# Patient Record
Sex: MALE | Race: WHITE | NOT HISPANIC OR LATINO | Employment: FULL TIME | URBAN - METROPOLITAN AREA
[De-identification: names, ages, dates, MRNs, and addresses within clinical notes are randomized per-mention and may not be internally consistent; named-entity substitution may affect disease eponyms.]

---

## 2023-10-17 ENCOUNTER — HOSPITAL ENCOUNTER (EMERGENCY)
Facility: HOSPITAL | Age: 63
Discharge: HOME | End: 2023-10-17
Attending: STUDENT IN AN ORGANIZED HEALTH CARE EDUCATION/TRAINING PROGRAM
Payer: COMMERCIAL

## 2023-10-17 ENCOUNTER — APPOINTMENT (OUTPATIENT)
Dept: RADIOLOGY | Facility: HOSPITAL | Age: 63
End: 2023-10-17
Payer: COMMERCIAL

## 2023-10-17 VITALS
HEART RATE: 75 BPM | HEIGHT: 72 IN | BODY MASS INDEX: 29.8 KG/M2 | OXYGEN SATURATION: 98 % | RESPIRATION RATE: 18 BRPM | SYSTOLIC BLOOD PRESSURE: 170 MMHG | TEMPERATURE: 97.7 F | WEIGHT: 220 LBS | DIASTOLIC BLOOD PRESSURE: 95 MMHG

## 2023-10-17 DIAGNOSIS — I10 HYPERTENSION, UNSPECIFIED TYPE: Primary | ICD-10-CM

## 2023-10-17 LAB
ALBUMIN SERPL BCP-MCNC: 4.1 G/DL (ref 3.4–5)
ALP SERPL-CCNC: 58 U/L (ref 33–136)
ALT SERPL W P-5'-P-CCNC: 23 U/L (ref 10–52)
ANION GAP SERPL CALC-SCNC: 13 MMOL/L (ref 10–20)
APPEARANCE UR: CLEAR
AST SERPL W P-5'-P-CCNC: 23 U/L (ref 9–39)
BASOPHILS # BLD AUTO: 0.07 X10*3/UL (ref 0–0.1)
BASOPHILS NFR BLD AUTO: 1 %
BILIRUB SERPL-MCNC: 0.5 MG/DL (ref 0–1.2)
BILIRUB UR STRIP.AUTO-MCNC: NEGATIVE MG/DL
BNP SERPL-MCNC: 55 PG/ML (ref 0–99)
BUN SERPL-MCNC: 27 MG/DL (ref 6–23)
CALCIUM SERPL-MCNC: 9.1 MG/DL (ref 8.6–10.3)
CARDIAC TROPONIN I PNL SERPL HS: 10 NG/L (ref 0–20)
CHLORIDE SERPL-SCNC: 101 MMOL/L (ref 98–107)
CO2 SERPL-SCNC: 26 MMOL/L (ref 21–32)
COLOR UR: YELLOW
CREAT SERPL-MCNC: 1.12 MG/DL (ref 0.5–1.3)
EOSINOPHIL # BLD AUTO: 0.6 X10*3/UL (ref 0–0.7)
EOSINOPHIL NFR BLD AUTO: 8.5 %
ERYTHROCYTE [DISTWIDTH] IN BLOOD BY AUTOMATED COUNT: 12.6 % (ref 11.5–14.5)
GFR SERPL CREATININE-BSD FRML MDRD: 74 ML/MIN/1.73M*2
GLUCOSE SERPL-MCNC: 251 MG/DL (ref 74–99)
GLUCOSE UR STRIP.AUTO-MCNC: ABNORMAL MG/DL
HCT VFR BLD AUTO: 37.1 % (ref 41–52)
HGB BLD-MCNC: 12.5 G/DL (ref 13.5–17.5)
HOLD SPECIMEN: NORMAL
HYALINE CASTS #/AREA URNS AUTO: ABNORMAL /LPF
IMM GRANULOCYTES # BLD AUTO: 0.02 X10*3/UL (ref 0–0.7)
IMM GRANULOCYTES NFR BLD AUTO: 0.3 % (ref 0–0.9)
INR PPP: 1 (ref 0.9–1.1)
KETONES UR STRIP.AUTO-MCNC: NEGATIVE MG/DL
LEUKOCYTE ESTERASE UR QL STRIP.AUTO: NEGATIVE
LYMPHOCYTES # BLD AUTO: 0.78 X10*3/UL (ref 1.2–4.8)
LYMPHOCYTES NFR BLD AUTO: 11.1 %
MCH RBC QN AUTO: 30 PG (ref 26–34)
MCHC RBC AUTO-ENTMCNC: 33.7 G/DL (ref 32–36)
MCV RBC AUTO: 89 FL (ref 80–100)
MONOCYTES # BLD AUTO: 0.39 X10*3/UL (ref 0.1–1)
MONOCYTES NFR BLD AUTO: 5.6 %
NEUTROPHILS # BLD AUTO: 5.16 X10*3/UL (ref 1.2–7.7)
NEUTROPHILS NFR BLD AUTO: 73.5 %
NITRITE UR QL STRIP.AUTO: NEGATIVE
NRBC BLD-RTO: 0 /100 WBCS (ref 0–0)
PH UR STRIP.AUTO: 6 [PH]
PLATELET # BLD AUTO: 334 X10*3/UL (ref 150–450)
PMV BLD AUTO: 10 FL (ref 7.5–11.5)
POTASSIUM SERPL-SCNC: 3.5 MMOL/L (ref 3.5–5.3)
PROT SERPL-MCNC: 7.3 G/DL (ref 6.4–8.2)
PROT UR STRIP.AUTO-MCNC: ABNORMAL MG/DL
PROTHROMBIN TIME: 11.5 SECONDS (ref 9.8–12.8)
RBC # BLD AUTO: 4.16 X10*6/UL (ref 4.5–5.9)
RBC # UR STRIP.AUTO: NEGATIVE /UL
RBC #/AREA URNS AUTO: ABNORMAL /HPF
SODIUM SERPL-SCNC: 136 MMOL/L (ref 136–145)
SP GR UR STRIP.AUTO: 1.01
UROBILINOGEN UR STRIP.AUTO-MCNC: <2 MG/DL
WBC # BLD AUTO: 7 X10*3/UL (ref 4.4–11.3)
WBC #/AREA URNS AUTO: ABNORMAL /HPF

## 2023-10-17 PROCEDURE — 71045 X-RAY EXAM CHEST 1 VIEW: CPT

## 2023-10-17 PROCEDURE — 36415 COLL VENOUS BLD VENIPUNCTURE: CPT | Performed by: STUDENT IN AN ORGANIZED HEALTH CARE EDUCATION/TRAINING PROGRAM

## 2023-10-17 PROCEDURE — 99285 EMERGENCY DEPT VISIT HI MDM: CPT | Performed by: STUDENT IN AN ORGANIZED HEALTH CARE EDUCATION/TRAINING PROGRAM

## 2023-10-17 PROCEDURE — 99284 EMERGENCY DEPT VISIT MOD MDM: CPT | Performed by: STUDENT IN AN ORGANIZED HEALTH CARE EDUCATION/TRAINING PROGRAM

## 2023-10-17 PROCEDURE — 83880 ASSAY OF NATRIURETIC PEPTIDE: CPT | Performed by: STUDENT IN AN ORGANIZED HEALTH CARE EDUCATION/TRAINING PROGRAM

## 2023-10-17 PROCEDURE — 80053 COMPREHEN METABOLIC PANEL: CPT | Performed by: STUDENT IN AN ORGANIZED HEALTH CARE EDUCATION/TRAINING PROGRAM

## 2023-10-17 PROCEDURE — 81001 URINALYSIS AUTO W/SCOPE: CPT | Performed by: STUDENT IN AN ORGANIZED HEALTH CARE EDUCATION/TRAINING PROGRAM

## 2023-10-17 PROCEDURE — 85610 PROTHROMBIN TIME: CPT | Performed by: STUDENT IN AN ORGANIZED HEALTH CARE EDUCATION/TRAINING PROGRAM

## 2023-10-17 PROCEDURE — 85025 COMPLETE CBC W/AUTO DIFF WBC: CPT | Performed by: STUDENT IN AN ORGANIZED HEALTH CARE EDUCATION/TRAINING PROGRAM

## 2023-10-17 PROCEDURE — 99283 EMERGENCY DEPT VISIT LOW MDM: CPT | Mod: 25

## 2023-10-17 PROCEDURE — 71045 X-RAY EXAM CHEST 1 VIEW: CPT | Performed by: RADIOLOGY

## 2023-10-17 PROCEDURE — 84484 ASSAY OF TROPONIN QUANT: CPT | Performed by: STUDENT IN AN ORGANIZED HEALTH CARE EDUCATION/TRAINING PROGRAM

## 2023-10-17 PROCEDURE — 93005 ELECTROCARDIOGRAM TRACING: CPT

## 2023-10-17 ASSESSMENT — LIFESTYLE VARIABLES
HAVE YOU EVER FELT YOU SHOULD CUT DOWN ON YOUR DRINKING: YES
REASON UNABLE TO ASSESS: NO
HAVE PEOPLE ANNOYED YOU BY CRITICIZING YOUR DRINKING: NO
EVER FELT BAD OR GUILTY ABOUT YOUR DRINKING: NO
EVER HAD A DRINK FIRST THING IN THE MORNING TO STEADY YOUR NERVES TO GET RID OF A HANGOVER: YES

## 2023-10-17 ASSESSMENT — COLUMBIA-SUICIDE SEVERITY RATING SCALE - C-SSRS
6. HAVE YOU EVER DONE ANYTHING, STARTED TO DO ANYTHING, OR PREPARED TO DO ANYTHING TO END YOUR LIFE?: NO
2. HAVE YOU ACTUALLY HAD ANY THOUGHTS OF KILLING YOURSELF?: NO
1. IN THE PAST MONTH, HAVE YOU WISHED YOU WERE DEAD OR WISHED YOU COULD GO TO SLEEP AND NOT WAKE UP?: NO

## 2023-10-17 ASSESSMENT — PAIN - FUNCTIONAL ASSESSMENT: PAIN_FUNCTIONAL_ASSESSMENT: 0-10

## 2023-10-17 NOTE — DISCHARGE INSTRUCTIONS
Please follow-up with your primary care physician.  Return to the emergency department if you develop any new, concerning, worsening symptoms.

## 2023-10-17 NOTE — ED PROVIDER NOTES
HPI   Chief Complaint   Patient presents with    Hypertension     Pt bp was in 180's, pt has hx of htn and takes medications,        This is a 63-year-old male patient with past medical history of hypertension presenting to the ED for asymptomatic hypertension.  He was visiting his PCP, for left-sided leg pain that has been ongoing for a long period of time, that he attributes to history of sciatica.  His blood pressure was noted be elevated on arrival to the PCP, he was instructed to take all of his home antihypertensives, and it did not this did not bring down his blood pressure to proceed the ED.  He denies having any chest pain, shortness of breath headache or other symptoms.                          [place] Coma Scale Score: 15                  Patient History   Past Medical History:   Diagnosis Date    Hypertension      No past surgical history on file.  No family history on file.  Social History     Tobacco Use    Smoking status: Never    Smokeless tobacco: Never   Substance Use Topics    Alcohol use: Yes     Alcohol/week: 3.0 standard drinks of alcohol     Types: 3 Shots of liquor per week     Comment: 3 drinks a day    Drug use: Never       Physical Exam   ED Triage Vitals [10/17/23 1153]   Temp Heart Rate Resp BP   36.5 °C (97.7 °F) 91 18 (!) 205/91      SpO2 Temp Source Heart Rate Source Patient Position   98 % Temporal Monitor Sitting      BP Location FiO2 (%)     Right arm --       Physical Exam  Constitutional:       Appearance: Normal appearance.   HENT:      Head: Normocephalic and atraumatic.      Mouth/Throat:      Mouth: Mucous membranes are moist.   Eyes:      Extraocular Movements: Extraocular movements intact.   Cardiovascular:      Rate and Rhythm: Normal rate and regular rhythm.      Heart sounds: Normal heart sounds. No murmur heard.  Pulmonary:      Effort: Pulmonary effort is normal. No respiratory distress.      Breath sounds: Normal breath sounds. No wheezing.   Abdominal:      General:  There is no distension.      Palpations: Abdomen is soft.      Tenderness: There is no abdominal tenderness. There is no guarding.   Musculoskeletal:      Right lower leg: No edema.      Left lower leg: No edema.   Skin:     General: Skin is warm and dry.   Neurological:      General: No focal deficit present.      Mental Status: He is alert and oriented to person, place, and time.   Psychiatric:         Mood and Affect: Mood normal.         Behavior: Behavior normal.         ED Course & [facility]   ED Course as of 10/17/23 1341   Tue Oct 17, 2023   1340 Blood pressure spontaneously improved by further intervention during his stay in the ED.  Laboratory studies grossly unremarkable, troponin and BNP is nonelevated. [VH]   1341 UA unremarkable, EKG nonischemic appearing.  He is appropriate discharge with outpatient PCP follow-up, strict return precautions.  Discussed with the attending  Matt Garcia DO PGY-4  Emergency Medicine     [VH]      ED Course User Index  [VH] Matt Garcia DO         Diagnoses as of 10/17/23 1341   Hypertension, unspecified type       Medical Decision Making  Hypertensive, but no symptoms on arrival to the ED concerning for asymptomatic hypertension.  We will obtain basic laboratory studies including troponin, BNP, CBC CMP to evaluate for electrolyte abnormalities or endorgan damage, urinalysis and chest x-ray.    ED Course as of 10/17/23 1341  ------------------------------------------------------------  Time: 10/17 1340  Comment: Blood pressure spontaneously improved by further intervention during his stay in the ED.  Laboratory studies grossly unremarkable, troponin and BNP is nonelevated.  By: Matt Garcia DO  ------------------------------------------------------------  Time: 10/17 1341  Comment: UA unremarkable, EKG nonischemic appearing.  He is appropriate discharge with outpatient PCP follow-up, strict return precautions.  Discussed with the attending  Matt Garcia DO  PGY-4  Emergency Medicine      By: Matt Garcia DO    ------------------------------------------------------------  Diagnoses as of 10/17/23 1341  Hypertension, unspecified type             Procedure  Procedures     Matt Garcia DO  Resident  10/17/23 1345

## 2023-10-20 ENCOUNTER — HOSPITAL ENCOUNTER (OUTPATIENT)
Dept: CARDIOLOGY | Facility: HOSPITAL | Age: 63
Discharge: HOME | End: 2023-10-20
Payer: COMMERCIAL

## 2023-10-20 LAB
ATRIAL RATE: 79 BPM
P AXIS: 64 DEGREES
P OFFSET: 195 MS
P ONSET: 126 MS
PR INTERVAL: 176 MS
Q ONSET: 214 MS
QRS COUNT: 13 BEATS
QRS DURATION: 110 MS
QT INTERVAL: 398 MS
QTC CALCULATION(BAZETT): 456 MS
QTC FREDERICIA: 436 MS
R AXIS: 49 DEGREES
T AXIS: 39 DEGREES
T OFFSET: 413 MS
VENTRICULAR RATE: 79 BPM

## 2024-02-23 ENCOUNTER — HOSPITAL ENCOUNTER (OUTPATIENT)
Dept: RADIOLOGY | Facility: CLINIC | Age: 64
Discharge: HOME | End: 2024-02-23
Payer: COMMERCIAL

## 2024-02-23 DIAGNOSIS — M25.552 PAIN IN LEFT HIP: ICD-10-CM

## 2024-02-23 PROCEDURE — 73523 X-RAY EXAM HIPS BI 5/> VIEWS: CPT | Mod: BILATERAL PROCEDURE | Performed by: RADIOLOGY

## 2024-02-23 PROCEDURE — 73523 X-RAY EXAM HIPS BI 5/> VIEWS: CPT

## 2024-03-20 ENCOUNTER — APPOINTMENT (OUTPATIENT)
Dept: RADIOLOGY | Facility: CLINIC | Age: 64
End: 2024-03-20
Payer: COMMERCIAL

## 2024-04-08 ENCOUNTER — APPOINTMENT (OUTPATIENT)
Dept: ORTHOPEDIC SURGERY | Facility: CLINIC | Age: 64
End: 2024-04-08
Payer: COMMERCIAL

## 2024-04-19 RX ORDER — METOPROLOL SUCCINATE 100 MG/1
100 TABLET, EXTENDED RELEASE ORAL DAILY
COMMUNITY

## 2024-04-19 RX ORDER — ATORVASTATIN CALCIUM 40 MG/1
40 TABLET, FILM COATED ORAL DAILY
COMMUNITY

## 2024-04-22 ENCOUNTER — HOSPITAL ENCOUNTER (OUTPATIENT)
Dept: RADIOLOGY | Facility: HOSPITAL | Age: 64
Discharge: HOME | End: 2024-04-22
Payer: COMMERCIAL

## 2024-04-22 VITALS
RESPIRATION RATE: 16 BRPM | WEIGHT: 205.69 LBS | BODY MASS INDEX: 27.86 KG/M2 | OXYGEN SATURATION: 99 % | HEART RATE: 71 BPM | HEIGHT: 72 IN | SYSTOLIC BLOOD PRESSURE: 149 MMHG | TEMPERATURE: 98.4 F | DIASTOLIC BLOOD PRESSURE: 72 MMHG

## 2024-04-22 DIAGNOSIS — M54.16 RADICULOPATHY, LUMBAR REGION: ICD-10-CM

## 2024-04-22 LAB
INR PPP: 1 (ref 0.9–1.1)
PLATELET # BLD AUTO: 231 X10*3/UL (ref 150–450)
PROTHROMBIN TIME: 11.5 SECONDS (ref 9.8–12.8)

## 2024-04-22 PROCEDURE — 36415 COLL VENOUS BLD VENIPUNCTURE: CPT | Performed by: RADIOLOGY

## 2024-04-22 PROCEDURE — 85610 PROTHROMBIN TIME: CPT | Performed by: RADIOLOGY

## 2024-04-22 PROCEDURE — 85049 AUTOMATED PLATELET COUNT: CPT | Performed by: RADIOLOGY

## 2024-04-22 PROCEDURE — 2550000001 HC RX 255 CONTRASTS

## 2024-04-22 PROCEDURE — 62304 MYELOGRAPHY LUMBAR INJECTION: CPT

## 2024-04-22 PROCEDURE — 62304 MYELOGRAPHY LUMBAR INJECTION: CPT | Performed by: RADIOLOGY

## 2024-04-22 PROCEDURE — 72132 CT LUMBAR SPINE W/DYE: CPT | Performed by: RADIOLOGY

## 2024-04-22 PROCEDURE — 72132 CT LUMBAR SPINE W/DYE: CPT

## 2024-04-22 PROCEDURE — 2500000005 HC RX 250 GENERAL PHARMACY W/O HCPCS

## 2024-04-22 RX ORDER — LIDOCAINE HYDROCHLORIDE 20 MG/ML
INJECTION, SOLUTION EPIDURAL; INFILTRATION; INTRACAUDAL; PERINEURAL AS NEEDED
Status: COMPLETED | OUTPATIENT
Start: 2024-04-22 | End: 2024-04-22

## 2024-04-22 RX ORDER — METFORMIN HYDROCHLORIDE 500 MG/1
500 TABLET, EXTENDED RELEASE ORAL
COMMUNITY

## 2024-04-22 RX ORDER — VERAPAMIL HYDROCHLORIDE 240 MG/1
240 CAPSULE, EXTENDED RELEASE ORAL NIGHTLY
COMMUNITY

## 2024-04-22 RX ORDER — RAMIPRIL 10 MG/1
10 CAPSULE ORAL DAILY
COMMUNITY

## 2024-04-22 RX ORDER — CHLORTHALIDONE 50 MG/1
50 TABLET ORAL DAILY
COMMUNITY

## 2024-04-22 RX ADMIN — LIDOCAINE HYDROCHLORIDE 5 ML: 20 INJECTION, SOLUTION EPIDURAL; INFILTRATION; INTRACAUDAL; PERINEURAL at 09:38

## 2024-04-22 RX ADMIN — IOHEXOL 15 ML: 240 INJECTION, SOLUTION INTRATHECAL; INTRAVASCULAR; INTRAVENOUS; ORAL at 09:51

## 2024-04-22 ASSESSMENT — PAIN - FUNCTIONAL ASSESSMENT
PAIN_FUNCTIONAL_ASSESSMENT: 0-10

## 2024-04-22 ASSESSMENT — PAIN SCALES - GENERAL
PAINLEVEL_OUTOF10: 2
PAINLEVEL_OUTOF10: 4
PAINLEVEL_OUTOF10: 3
PAINLEVEL_OUTOF10: 2
PAINLEVEL_OUTOF10: 5 - MODERATE PAIN
PAINLEVEL_OUTOF10: 3
PAINLEVEL_OUTOF10: 2
PAINLEVEL_OUTOF10: 2
PAINLEVEL_OUTOF10: 3
PAINLEVEL_OUTOF10: 5 - MODERATE PAIN

## 2024-04-22 ASSESSMENT — PAIN DESCRIPTION - DESCRIPTORS
DESCRIPTORS: ACHING

## 2024-04-22 ASSESSMENT — COLUMBIA-SUICIDE SEVERITY RATING SCALE - C-SSRS
1. IN THE PAST MONTH, HAVE YOU WISHED YOU WERE DEAD OR WISHED YOU COULD GO TO SLEEP AND NOT WAKE UP?: NO
6. HAVE YOU EVER DONE ANYTHING, STARTED TO DO ANYTHING, OR PREPARED TO DO ANYTHING TO END YOUR LIFE?: NO
2. HAVE YOU ACTUALLY HAD ANY THOUGHTS OF KILLING YOURSELF?: NO

## 2024-04-22 NOTE — POST-PROCEDURE NOTE
Successful fluoroscopic guided lumbar puncture for CT myelogram.  Site: L2-3  Local anesthesia with 2% Lidocaine.  22 ga spinal needle.  Free return clear CSF.  Intrathecal injection 15 mL Omnipaque 240  Needle removed and band aid placed.   Tolerated procedure well.  Transfer to CT immediately for scheduled scan then routine post procedural recovery.

## 2024-04-22 NOTE — Clinical Note
Procedure completed, pt tolerated well, 15mls of contrast utilized, no headache, taken to CT via cart.

## 2024-04-22 NOTE — Clinical Note
Pt in CT for scan, tolerated well, report called to [facility] and pt will be transported back to [facility] , bandaid on lower back site, no bleeding.

## 2024-05-14 ENCOUNTER — DOCUMENTATION (OUTPATIENT)
Dept: ORTHOPEDIC SURGERY | Facility: CLINIC | Age: 64
End: 2024-05-14

## 2024-05-14 ENCOUNTER — OFFICE VISIT (OUTPATIENT)
Dept: ORTHOPEDIC SURGERY | Facility: CLINIC | Age: 64
End: 2024-05-14
Payer: COMMERCIAL

## 2024-05-14 DIAGNOSIS — R27.0 ATAXIA: Primary | ICD-10-CM

## 2024-05-14 PROCEDURE — 1036F TOBACCO NON-USER: CPT | Performed by: ORTHOPAEDIC SURGERY

## 2024-05-14 PROCEDURE — 99214 OFFICE O/P EST MOD 30 MIN: CPT | Performed by: ORTHOPAEDIC SURGERY

## 2024-05-14 PROCEDURE — 99204 OFFICE O/P NEW MOD 45 MIN: CPT | Performed by: ORTHOPAEDIC SURGERY

## 2024-05-14 NOTE — PROGRESS NOTES
This 63-year-old man is referred by Dr. Felicia Garcia.    He has a history of congenital hydrocephalus.  He has a  shunt in place.  He is here today with his , Lesly.    They both describe gait abnormality over the last several years, perhaps more severe over the last year.  He does describe diffuse, chronic low back pain.  He describes some discomfort in both legs although not classic radiculopathy nor neurogenic claudication.    His ambulation is limited by general fatigue.    He has seen a neurosurgeon at [facility] for evaluation of his  shunt.  Apparently there are no issues associated with this although I do not have documentation confirming this.    His  shunt was performed over 20 years ago.  Attempts were made to determine whether it was MRI compatible but it was not possible to do so.  As such, he recently had an lumbar CT myelogram.    He did see Dr. Jones for evaluation of advanced arthritic change radiographically in both hips.    Family, social, and medical histories are obtained and reviewed.    30-point, patient-recorded Review of Systems is personally obtained and reviewed. Inclusive is no history of weight loss, change in appetite, recent change in activity level, change in bowel or bladder habits, fevers, chills, malaise, or night pain.    On exam he is a pleasant middle-age man no acute distress.  He is able to arise from a sitting position without difficulty.  He has a slow deliberate gait.  It is not a wide-based gait.    He has painless motion of the cervical spine.  Negative Lhermitte's.    His strength is intact in both upper extremities.  He does have positive Stephanie's bilaterally.    There is no active internal or external rotation of either hip joint passively.  This does not produce any pain however.    His strength is intact in the lower extremities without pathologic reflexes.    A CT scan from 2020 one of the cervical spine shows loss of cervical lordosis.   Mild degenerative change.  There is evidence of congenital stenosis at that time.    His CT myelogram of the lumbar spine shows moderate degenerative change, a component of congenital stenosis and the result is moderate multilevel stenosis L2-3 through L4-5.    Plain films of his pelvis and hip show advanced arthritic change with effectively ankylosed joints.    Impression: This older man has difficulty with ambulation somewhat chronically, some degree of chronic low back pain, and perhaps some degree of leg pain although not severe.    I suspect his walking difficulty is multifactorial.    To thoroughly evaluate his spinal condition, it would be important to assess for the presence of any underlying cervical or thoracic stenosis.  With his congenital lumbar stenosis, it is possible that there is underlying tandem stenosis in either the cervical or thoracic spine which might be playing a role in his gait abnormality.    Potentially, a lumbar decompression could be considered.  The predictability of significant improvement in his gait I think would be somewhat limited.  Ultimately it might be necessary to consider surgery on both his lumbar spine and his hips to attempt to achieve the most significant improvement in his gait.    Other options would be an aggressive therapy program, the use of a walker, gait training program, and continued water therapy which she has done in the past.    I think a thorough neurologic evaluation would be indicated as well.    I will reach out to his primary care physician and recommend the possibility of a CT myelogram of the cervical and thoracic spine.    ** Dictated with voice recognition software and not immediately reviewed for errors in grammar and/or spelling **

## 2024-05-14 NOTE — PROGRESS NOTES
Dear Dr. Garcia,    Thank you for the opportunity of seeing Otto Yen.    As you know, he is developed somewhat chronic difficulty with ambulation.  He has chronic low back pain and perhaps some degree of lumbar radicular pain.    He does have moderately severe lumbar stenosis on CT myelogram.    I think it is likely that his gait abnormality is multifactorial, perhaps related to his hydrocephalus, his lumbar stenosis, and his advanced arthritis in both hips.    Potentially surgery to address his lumbar stenosis could be considered.    Because he has congenital lumbar stenosis, I think it would be very important to assess the remainder of his spine, both his cervical and thoracic spine to identify any underlying stenosis and spinal cord compression.  Obviously, this would mean an additional CT myelogram.    His prior myelogram was a excellent quality study and potentially this could be repeated.    Thank you again for the opportunity of seeing this pleasant man.    Please don't hesitate to contact me at any time, via e-mail [email or web address] or cell phone [phone].      Sincerely,        Oscar Fortune M.D.  Chief, Spine Section  Professor & Steven Jackman M.D. Endowed Chair   Department of Orthopedic Surgery  [place], [place]

## 2024-05-14 NOTE — LETTER
May 14, 2024     Felicia Garcia [place]  [id number] [address] [address]    Patient: Otto Yen   YOB: 1960   Date of Visit: 5/14/2024       Dear Dr. Felicia Garcia DO:    Thank you for referring Otto Yen to me for evaluation. Below are my notes for this consultation.  If you have questions, please do not hesitate to call me. I look forward to following your patient along with you.       Sincerely,     Oscar Fortune MD      CC: No Recipients  ______________________________________________________________________________________    This 63-year-old man is referred by Dr. Felicia Garcia.    He has a history of congenital hydrocephalus.  He has a  shunt in place.  He is here today with his , Lesly.    They both describe gait abnormality over the last several years, perhaps more severe over the last year.  He does describe diffuse, chronic low back pain.  He describes some discomfort in both legs although not classic radiculopathy nor neurogenic claudication.    His ambulation is limited by general fatigue.    He has seen a neurosurgeon at [facility] for evaluation of his  shunt.  Apparently there are no issues associated with this although I do not have documentation confirming this.    His  shunt was performed over 20 years ago.  Attempts were made to determine whether it was MRI compatible but it was not possible to do so.  As such, he recently had an lumbar CT myelogram.    He did see Dr. Jones for evaluation of advanced arthritic change radiographically in both hips.    Family, social, and medical histories are obtained and reviewed.    30-point, patient-recorded Review of Systems is personally obtained and reviewed. Inclusive is no history of weight loss, change in appetite, recent change in activity level, change in bowel or bladder habits, fevers, chills, malaise, or night pain.    On exam he is a pleasant middle-age man no acute  distress.  He is able to arise from a sitting position without difficulty.  He has a slow deliberate gait.  It is not a wide-based gait.    He has painless motion of the cervical spine.  Negative Lhermitte's.    His strength is intact in both upper extremities.  He does have positive Stephanie's bilaterally.    There is no active internal or external rotation of either hip joint passively.  This does not produce any pain however.    His strength is intact in the lower extremities without pathologic reflexes.    A CT scan from 2020 one of the cervical spine shows loss of cervical lordosis.  Mild degenerative change.  There is evidence of congenital stenosis at that time.    His CT myelogram of the lumbar spine shows moderate degenerative change, a component of congenital stenosis and the result is moderate multilevel stenosis L2-3 through L4-5.    Plain films of his pelvis and hip show advanced arthritic change with effectively ankylosed joints.    Impression: This older man has difficulty with ambulation somewhat chronically, some degree of chronic low back pain, and perhaps some degree of leg pain although not severe.    I suspect his walking difficulty is multifactorial.    To thoroughly evaluate his spinal condition, it would be important to assess for the presence of any underlying cervical or thoracic stenosis.  With his congenital lumbar stenosis, it is possible that there is underlying tandem stenosis in either the cervical or thoracic spine which might be playing a role in his gait abnormality.    Potentially, a lumbar decompression could be considered.  The predictability of significant improvement in his gait I think would be somewhat limited.  Ultimately it might be necessary to consider surgery on both his lumbar spine and his hips to attempt to achieve the most significant improvement in his gait.    Other options would be an aggressive therapy program, the use of a walker, gait training program, and  continued water therapy which she has done in the past.    I think a thorough neurologic evaluation would be indicated as well.    I will reach out to his primary care physician and recommend the possibility of a CT myelogram of the cervical and thoracic spine.    ** Dictated with voice recognition software and not immediately reviewed for errors in grammar and/or spelling **

## 2024-06-05 DIAGNOSIS — N18.31 CHRONIC KIDNEY DISEASE, STAGE 3A (MULTI): Primary | ICD-10-CM
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
## 2024-06-10 ENCOUNTER — LAB (OUTPATIENT)
Dept: LAB | Facility: LAB | Age: 64
End: 2024-06-10
Payer: COMMERCIAL

## 2024-06-10 DIAGNOSIS — N18.31 CHRONIC KIDNEY DISEASE, STAGE 3A (MULTI): ICD-10-CM

## 2024-06-10 PROCEDURE — 36415 COLL VENOUS BLD VENIPUNCTURE: CPT

## 2024-06-10 PROCEDURE — 85025 COMPLETE CBC W/AUTO DIFF WBC: CPT

## 2024-06-10 PROCEDURE — 80053 COMPREHEN METABOLIC PANEL: CPT

## 2024-06-11 LAB
ALBUMIN SERPL BCP-MCNC: 4.4 G/DL (ref 3.4–5)
ALP SERPL-CCNC: 59 U/L (ref 33–136)
ALT SERPL W P-5'-P-CCNC: 14 U/L (ref 10–52)
ANION GAP SERPL CALC-SCNC: 14 MMOL/L (ref 10–20)
AST SERPL W P-5'-P-CCNC: 16 U/L (ref 9–39)
BASOPHILS # BLD AUTO: 0.11 X10*3/UL (ref 0–0.1)
BASOPHILS NFR BLD AUTO: 1.3 %
BILIRUB SERPL-MCNC: 0.4 MG/DL (ref 0–1.2)
BUN SERPL-MCNC: 36 MG/DL (ref 6–23)
CALCIUM SERPL-MCNC: 9.6 MG/DL (ref 8.6–10.6)
CHLORIDE SERPL-SCNC: 107 MMOL/L (ref 98–107)
CO2 SERPL-SCNC: 25 MMOL/L (ref 21–32)
CREAT SERPL-MCNC: 1.43 MG/DL (ref 0.5–1.3)
EGFRCR SERPLBLD CKD-EPI 2021: 55 ML/MIN/1.73M*2
EOSINOPHIL # BLD AUTO: 0.88 X10*3/UL (ref 0–0.7)
EOSINOPHIL NFR BLD AUTO: 10.7 %
ERYTHROCYTE [DISTWIDTH] IN BLOOD BY AUTOMATED COUNT: 13.3 % (ref 11.5–14.5)
GLUCOSE SERPL-MCNC: 81 MG/DL (ref 74–99)
HCT VFR BLD AUTO: 35.7 % (ref 41–52)
HGB BLD-MCNC: 11.9 G/DL (ref 13.5–17.5)
IMM GRANULOCYTES # BLD AUTO: 0.02 X10*3/UL (ref 0–0.7)
IMM GRANULOCYTES NFR BLD AUTO: 0.2 % (ref 0–0.9)
LYMPHOCYTES # BLD AUTO: 1.26 X10*3/UL (ref 1.2–4.8)
LYMPHOCYTES NFR BLD AUTO: 15.4 %
MCH RBC QN AUTO: 29.8 PG (ref 26–34)
MCHC RBC AUTO-ENTMCNC: 33.3 G/DL (ref 32–36)
MCV RBC AUTO: 89 FL (ref 80–100)
MONOCYTES # BLD AUTO: 0.69 X10*3/UL (ref 0.1–1)
MONOCYTES NFR BLD AUTO: 8.4 %
NEUTROPHILS # BLD AUTO: 5.24 X10*3/UL (ref 1.2–7.7)
NEUTROPHILS NFR BLD AUTO: 64 %
NRBC BLD-RTO: 0 /100 WBCS (ref 0–0)
PLATELET # BLD AUTO: 273 X10*3/UL (ref 150–450)
POTASSIUM SERPL-SCNC: 4.2 MMOL/L (ref 3.5–5.3)
PROT SERPL-MCNC: 7.1 G/DL (ref 6.4–8.2)
RBC # BLD AUTO: 4 X10*6/UL (ref 4.5–5.9)
SODIUM SERPL-SCNC: 142 MMOL/L (ref 136–145)
WBC # BLD AUTO: 8.2 X10*3/UL (ref 4.4–11.3)

## 2024-06-17 ENCOUNTER — APPOINTMENT (OUTPATIENT)
Dept: ORTHOPEDIC SURGERY | Facility: HOSPITAL | Age: 64
End: 2024-06-17
Payer: COMMERCIAL

## 2024-06-18 ENCOUNTER — APPOINTMENT (OUTPATIENT)
Dept: RADIOLOGY | Facility: HOSPITAL | Age: 64
End: 2024-06-18
Payer: COMMERCIAL

## 2024-06-19 ENCOUNTER — OFFICE VISIT (OUTPATIENT)
Dept: ORTHOPEDIC SURGERY | Facility: HOSPITAL | Age: 64
End: 2024-06-19
Payer: COMMERCIAL

## 2024-06-19 DIAGNOSIS — M16.12 PRIMARY OSTEOARTHRITIS OF LEFT HIP: ICD-10-CM

## 2024-06-19 DIAGNOSIS — M16.11 PRIMARY OSTEOARTHRITIS OF RIGHT HIP: Primary | ICD-10-CM

## 2024-06-19 PROCEDURE — 99214 OFFICE O/P EST MOD 30 MIN: CPT | Mod: 57 | Performed by: STUDENT IN AN ORGANIZED HEALTH CARE EDUCATION/TRAINING PROGRAM

## 2024-06-19 ASSESSMENT — PAIN DESCRIPTION - DESCRIPTORS: DESCRIPTORS: ACHING;SORE

## 2024-06-19 ASSESSMENT — PAIN - FUNCTIONAL ASSESSMENT: PAIN_FUNCTIONAL_ASSESSMENT: 0-10

## 2024-06-19 ASSESSMENT — PAIN SCALES - GENERAL: PAINLEVEL_OUTOF10: 5 - MODERATE PAIN

## 2024-06-22 PROBLEM — M16.11 UNILATERAL PRIMARY OSTEOARTHRITIS, RIGHT HIP: Status: ACTIVE | Noted: 2024-08-15

## 2024-06-25 ENCOUNTER — APPOINTMENT (OUTPATIENT)
Dept: RADIOLOGY | Facility: HOSPITAL | Age: 64
End: 2024-06-25
Payer: COMMERCIAL

## 2024-06-27 ENCOUNTER — HOSPITAL ENCOUNTER (OUTPATIENT)
Dept: RADIOLOGY | Facility: HOSPITAL | Age: 64
Discharge: HOME | End: 2024-06-27
Payer: COMMERCIAL

## 2024-06-27 ENCOUNTER — APPOINTMENT (OUTPATIENT)
Dept: ORTHOPEDIC SURGERY | Facility: CLINIC | Age: 64
End: 2024-06-27
Payer: COMMERCIAL

## 2024-06-27 VITALS
OXYGEN SATURATION: 100 % | RESPIRATION RATE: 20 BRPM | HEIGHT: 72 IN | HEART RATE: 76 BPM | BODY MASS INDEX: 25.06 KG/M2 | DIASTOLIC BLOOD PRESSURE: 89 MMHG | WEIGHT: 185 LBS | SYSTOLIC BLOOD PRESSURE: 132 MMHG | TEMPERATURE: 97.9 F

## 2024-06-27 DIAGNOSIS — Q76.49 OTHER CONGENITAL MALFORMATIONS OF SPINE, NOT ASSOCIATED WITH SCOLIOSIS: ICD-10-CM

## 2024-06-27 PROCEDURE — 2550000001 HC RX 255 CONTRASTS: Performed by: RADIOLOGY

## 2024-06-27 PROCEDURE — 72129 CT CHEST SPINE W/DYE: CPT

## 2024-06-27 PROCEDURE — 62305 MYELOGRAPHY LUMBAR INJECTION: CPT | Performed by: RADIOLOGY

## 2024-06-27 PROCEDURE — 7100000009 HC PHASE TWO TIME - INITIAL BASE CHARGE

## 2024-06-27 PROCEDURE — 7100000010 HC PHASE TWO TIME - EACH INCREMENTAL 1 MINUTE

## 2024-06-27 PROCEDURE — 62305 MYELOGRAPHY LUMBAR INJECTION: CPT

## 2024-06-27 PROCEDURE — 72129 CT CHEST SPINE W/DYE: CPT | Performed by: RADIOLOGY

## 2024-06-27 PROCEDURE — 72126 CT NECK SPINE W/DYE: CPT | Performed by: RADIOLOGY

## 2024-06-27 PROCEDURE — 72126 CT NECK SPINE W/DYE: CPT

## 2024-06-27 RX ORDER — ACETAMINOPHEN 325 MG/1
650 TABLET ORAL ONCE AS NEEDED
Status: DISCONTINUED | OUTPATIENT
Start: 2024-06-27 | End: 2024-06-28 | Stop reason: HOSPADM

## 2024-06-27 RX ORDER — LIDOCAINE HYDROCHLORIDE 10 MG/ML
5 INJECTION, SOLUTION EPIDURAL; INFILTRATION; INTRACAUDAL; PERINEURAL ONCE
Status: DISCONTINUED | OUTPATIENT
Start: 2024-06-27 | End: 2024-06-28 | Stop reason: HOSPADM

## 2024-06-27 ASSESSMENT — PAIN SCALES - GENERAL
PAINLEVEL_OUTOF10: 3

## 2024-06-27 ASSESSMENT — PAIN - FUNCTIONAL ASSESSMENT
PAIN_FUNCTIONAL_ASSESSMENT: 0-10

## 2024-06-27 ASSESSMENT — PAIN DESCRIPTION - DESCRIPTORS
DESCRIPTORS: CRAMPING
DESCRIPTORS: ACHING
DESCRIPTORS: CRAMPING

## 2024-06-27 NOTE — PRE-PROCEDURE NOTE
Interventional Radiology Preprocedure Note    Indication for procedure: The encounter diagnosis was Other congenital malformations of spine, not associated with scoliosis.    Relevant review of systems: NA    Relevant Labs:   Lab Results   Component Value Date    CREATININE 1.43 (H) 06/10/2024    EGFR 55 (L) 06/10/2024    INR 1.0 04/22/2024    PROTIME 11.5 04/22/2024       Planned Sedation/Anesthesia: local lidocaine    Airway assessment: normal    Directed physical examination:    Alert and oriented        Benefits, risks and alternatives of procedure and planned sedation have been discussed with the patient and/or their representative. All questions answered and they agree to proceed.

## 2024-06-27 NOTE — DISCHARGE INSTRUCTIONS
Follow instructions on Myelogram patient info sheet #903    - You need to have a responsible adult accompany you home.  - You may resume your normal diet.  - We strongly suggest that a responsible adult be with you for the rest of the day and also during the night. This is for your protection and safety.     For questions related to your procedure:  Please call [phone] between the hours of 7:00am-5:00pm Monday through Friday.  Please call [phone] after 5:00pm and on weekends and holidays.     In the event of an emergency call [phone] or go to your nearest emergency room.

## 2024-06-27 NOTE — POST-PROCEDURE NOTE
Interventional Radiology Brief Postprocedure Note    Attending: Dr. Samson    Assistant: Dr. De La Cruz    Diagnosis: back pain    Description of procedure:     The L3-4 vertebral space marked under fluoroscopy. Patient was prepped and draped in the usual sterile fashion. 5 ml 1% lidocaine injected for local anesthesia. Under direct fluoroscopic guidance, a lumber puncture was performed at the L3-4 interspace using a 20g spinal needle. A total of 20 mL of intrathecal contrast was subsequently injected. Multiple static fluoroscopic images were then obtained.  The stylet was replaced and needle was removed. A Band-Aid was applied. No immediate complications. The patient was then sent directly to the CT scanner. See PACSfor full details.       Anesthesia:  Local    Complications: None    Estimated Blood Loss: minimal    No specimens collected      See detailed result report with images in PACS.    The patient tolerated the procedure well without incident or complication and is in stable condition.

## 2024-07-01 ENCOUNTER — APPOINTMENT (OUTPATIENT)
Dept: RADIOLOGY | Facility: HOSPITAL | Age: 64
End: 2024-07-01
Payer: COMMERCIAL

## 2024-07-03 ENCOUNTER — APPOINTMENT (OUTPATIENT)
Dept: NEUROLOGY | Facility: CLINIC | Age: 64
End: 2024-07-03
Payer: COMMERCIAL

## 2024-07-03 VITALS
DIASTOLIC BLOOD PRESSURE: 80 MMHG | SYSTOLIC BLOOD PRESSURE: 130 MMHG | WEIGHT: 210.5 LBS | TEMPERATURE: 97.5 F | BODY MASS INDEX: 28.51 KG/M2 | HEIGHT: 72 IN | HEART RATE: 59 BPM

## 2024-07-03 DIAGNOSIS — R41.3 MEMORY CHANGE: ICD-10-CM

## 2024-07-03 DIAGNOSIS — Q03.9 CONGENITAL HYDROCEPHALUS (MULTI): Primary | ICD-10-CM

## 2024-07-03 PROCEDURE — 1036F TOBACCO NON-USER: CPT | Performed by: PSYCHIATRY & NEUROLOGY

## 2024-07-03 PROCEDURE — 99204 OFFICE O/P NEW MOD 45 MIN: CPT | Performed by: PSYCHIATRY & NEUROLOGY

## 2024-07-03 RX ORDER — LIDOCAINE 50 MG/G
PATCH TOPICAL
COMMUNITY
Start: 2024-06-21

## 2024-07-03 RX ORDER — KETOCONAZOLE 20 MG/G
CREAM TOPICAL
COMMUNITY

## 2024-07-03 RX ORDER — NALTREXONE HYDROCHLORIDE 50 MG/1
1 TABLET, FILM COATED ORAL NIGHTLY
COMMUNITY

## 2024-07-03 RX ORDER — DICLOFENAC SODIUM 10 MG/G
GEL TOPICAL
COMMUNITY
Start: 2023-10-17

## 2024-07-03 RX ORDER — THIAMINE HCL 500 MG
TABLET ORAL
COMMUNITY

## 2024-07-03 RX ORDER — KETOCONAZOLE 20 MG/ML
SHAMPOO, SUSPENSION TOPICAL
COMMUNITY

## 2024-07-03 ASSESSMENT — LIFESTYLE VARIABLES
HOW OFTEN DO YOU HAVE A DRINK CONTAINING ALCOHOL: NEVER
SKIP TO QUESTIONS 9-10: 1
HOW OFTEN DO YOU HAVE SIX OR MORE DRINKS ON ONE OCCASION: NEVER
HOW MANY STANDARD DRINKS CONTAINING ALCOHOL DO YOU HAVE ON A TYPICAL DAY: PATIENT DOES NOT DRINK
AUDIT-C TOTAL SCORE: 0

## 2024-07-03 ASSESSMENT — PATIENT HEALTH QUESTIONNAIRE - PHQ9
2. FEELING DOWN, DEPRESSED OR HOPELESS: NOT AT ALL
SUM OF ALL RESPONSES TO PHQ9 QUESTIONS 1 & 2: 0
1. LITTLE INTEREST OR PLEASURE IN DOING THINGS: NOT AT ALL

## 2024-07-03 NOTE — PROGRESS NOTES
Consulting Physician: Dr. Garcia    Chief Complaint: Memory Difficulty    History Of Present Illness  Otto Yen is a 64 y.o. male presenting with memory loss.    The patient has congential hydrocephalus.  He has a  Shunt. The patient has always had memory difficulty.  He will forget recent conversations and recent events.  His caretaker/assistant notes that he occasionally repeats himself.  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  He denies any difficulty with job performance.  He does drive without difficulty.  His assistant manages the bills.      He walks with a cane due to hip arthritis.    He denies urinary incontinence but does note that he has to rush to the bathroom.             Past Medical History  Past Medical History:   Diagnosis Date    Arthritis     Asthma (HHS-HCC)     Cataract     Hydrocephalus (Multi)     Hypertension     Sleep apnea        Surgical History  Past Surgical History:   Procedure Laterality Date    KNEE ARTHROPLASTY         Family History  No family history on file.     Social History   reports that he has never smoked. He has never used smokeless tobacco. He reports that he does not currently use alcohol. He reports that he does not currently use drugs.     Allergies  Penicillin    Medications    Current Outpatient Medications:     atorvastatin (Lipitor) 40 mg tablet, Take 1 tablet (40 mg) by mouth once daily., Disp: , Rfl:     chlorthalidone (Hygroton) 50 mg tablet, Take 1 tablet (50 mg) by mouth once daily., Disp: , Rfl:     diclofenac sodium (Voltaren) 1 % gel, APPLY 2 GRAMS TOPICALLY TO THE AFFECTED AREA UP TO FOUR TIMES DAILY, Disp: , Rfl:     ketoconazole (NIZOral) 2 % cream, APPLY ONCE DAILY TO RASH ON FACE AND EARS UNTIL CLEAR. REPEAT AS NEEDED FOR FLARES., Disp: , Rfl:     ketoconazole (NIZOral) 2 % shampoo, PLEASE SEE ATTACHED FOR DETAILED DIRECTIONS, Disp: , Rfl:     lidocaine (Lidoderm) 5 % patch, APPLY 1 PATCH TWICE A DAY BY TOPICAL ROUTE AS  DIRECTED FOR 30 DAYS., Disp: , Rfl:     metFORMIN  mg 24 hr tablet, Take 1 tablet (500 mg) by mouth once daily in the evening. Take with meals. Do not crush, chew, or split., Disp: , Rfl:     metoprolol succinate XL (Toprol-XL) 100 mg 24 hr tablet, Take 1 tablet (100 mg) by mouth once daily. Do not crush or chew., Disp: , Rfl:     naltrexone (Depade) 50 mg tablet, Take 1 tablet (50 mg) by mouth once daily at bedtime., Disp: , Rfl:     ramipril (Altace) 10 mg capsule, Take 1 capsule (10 mg) by mouth once daily., Disp: , Rfl:     thiamine (Vitamin B-1) 500 mg tablet, Take by mouth., Disp: , Rfl:     verapamil ER (Veralan PM) 240 mg 24 hr capsule, Take 1 capsule (240 mg) by mouth once daily at bedtime. Do not crush or chew., Disp: , Rfl:       Last Recorded Vitals   There were no vitals taken for this visit.    Objective:  Gen: NAD  Neuro:  --HIF:   Mini-Mental Status Exam:  Orientation to Time (Year, Season, Month, Date, Day of Week): 5  Orientation to Place (State, County, City, Name of Place, Floor):  5  Registration (Apple, Table, Leila): 3  Attention (Spell 'World' backwards): 5  Delayed Verbal Recall: 2  Naming (Watch, Pen): 2  Repetition (No Ifs, Ands, or Buts): 1  Follows command with crossover: 3  Read a sentence: 1  Write a sentence: 1  Copy a figure: 1  Total Score:     --CN:  PERRLA, EOMI, VFF, no visible facial asymmetry, facial sensation intact, no tongue or palatal deviation, SCM intact  --Motor: Moves all 4 extremities equally; no focal deficits  --Sensory: Intact to light touch, intact to pinprick  --Reflex: 2+ symmetric, toes down  --Cerebellum: FTN and HTS intact  --Gait: Shuffling Gait    Relevant Results  Lab Results   Component Value Date    WBC 8.2 06/10/2024    HGB 11.9 (L) 06/10/2024    HCT 35.7 (L) 06/10/2024    MCV 89 06/10/2024     06/10/2024       Lab Results   Component Value Date    GLUCOSE 81 06/10/2024    CALCIUM 9.6 06/10/2024     06/10/2024    K 4.2 06/10/2024     "CO2 25 06/10/2024     06/10/2024    BUN 36 (H) 06/10/2024    CREATININE 1.43 (H) 06/10/2024       Lab Results   Component Value Date    HGBA1C 5.9 (H) 09/22/2022       No results found for: \"CHOL\"  No results found for: \"HDL\"  No results found for: \"LDLCALC\"  No results found for: \"TRIG\"  No components found for: \"CHOLHDL\"    CT Brain (I personally reviewed the images/tracings with the following interpretation) 2021  Diffuse hydrocephalus noted    CT Brain (2024):  IMPRESSION:   Right parietal ventriculostomy catheter with severe hydrocephalus, and cortical thinning. Findings not significantly changed from 5/26/2006.            Assessment:   Congenital Hydrocephalus s/p VPS  - recommend evaluation Neurosurgery evaluation     2.  Memory Changes  - he's had short-term memory difficulty for several years  - on exam, his MMSE was 29/30  - he remains very high functioning - he continues to work  - - encouraged patient to participate in mentally stimulating activities (i.e. crossword puzzles, sudoku puzzles, etc)  - encouraged physical exercise (which has been shown to be beneficial for memory health)  - recommend mediterranean diet  - check B12 level    Follow up in 1 year        Jared Guzman MD  [facility]  Department of Neurology      A copy of this note was sent to the referring provider.    "

## 2024-07-08 ENCOUNTER — LAB (OUTPATIENT)
Dept: LAB | Facility: LAB | Age: 64
End: 2024-07-08
Payer: COMMERCIAL

## 2024-07-08 DIAGNOSIS — R41.3 MEMORY CHANGE: ICD-10-CM

## 2024-07-08 PROCEDURE — 82607 VITAMIN B-12: CPT

## 2024-07-08 PROCEDURE — 36415 COLL VENOUS BLD VENIPUNCTURE: CPT

## 2024-07-09 LAB — VIT B12 SERPL-MCNC: 669 PG/ML (ref 211–911)

## 2024-07-11 ENCOUNTER — TELEPHONE (OUTPATIENT)
Dept: RHEUMATOLOGY | Facility: CLINIC | Age: 64
End: 2024-07-11

## 2024-07-23 ENCOUNTER — CLINICAL SUPPORT (OUTPATIENT)
Dept: PREADMISSION TESTING | Facility: HOSPITAL | Age: 64
End: 2024-07-23
Payer: COMMERCIAL

## 2024-07-23 ENCOUNTER — TELEPHONE (OUTPATIENT)
Dept: PREADMISSION TESTING | Facility: HOSPITAL | Age: 64
End: 2024-07-23
Payer: COMMERCIAL

## 2024-07-23 DIAGNOSIS — M16.11 UNILATERAL PRIMARY OSTEOARTHRITIS, RIGHT HIP: ICD-10-CM

## 2024-07-24 RX ORDER — CYANOCOBALAMIN 1000 UG/ML
1000 INJECTION, SOLUTION INTRAMUSCULAR; SUBCUTANEOUS
COMMUNITY

## 2024-07-24 RX ORDER — BISMUTH SUBSALICYLATE 262 MG
1 TABLET,CHEWABLE ORAL DAILY
COMMUNITY

## 2024-07-24 RX ORDER — IBUPROFEN 200 MG
400 TABLET ORAL 2 TIMES DAILY
COMMUNITY

## 2024-07-24 RX ORDER — GABAPENTIN 600 MG/1
600 TABLET ORAL NIGHTLY
COMMUNITY

## 2024-07-24 RX ORDER — GLUCOSAMINE/CHONDRO SU A 500-400 MG
1 TABLET ORAL DAILY
COMMUNITY

## 2024-07-25 ENCOUNTER — EDUCATION (OUTPATIENT)
Dept: ORTHOPEDIC SURGERY | Facility: CLINIC | Age: 64
End: 2024-07-25
Payer: COMMERCIAL

## 2024-07-25 ENCOUNTER — PRE-ADMISSION TESTING (OUTPATIENT)
Dept: PREADMISSION TESTING | Facility: HOSPITAL | Age: 64
End: 2024-07-25
Payer: COMMERCIAL

## 2024-07-25 ENCOUNTER — LAB (OUTPATIENT)
Dept: LAB | Facility: LAB | Age: 64
End: 2024-07-25
Payer: COMMERCIAL

## 2024-07-25 ENCOUNTER — DOCUMENTATION (OUTPATIENT)
Dept: PREADMISSION TESTING | Facility: HOSPITAL | Age: 64
End: 2024-07-25

## 2024-07-25 VITALS
SYSTOLIC BLOOD PRESSURE: 134 MMHG | DIASTOLIC BLOOD PRESSURE: 71 MMHG | TEMPERATURE: 97.2 F | WEIGHT: 214.95 LBS | RESPIRATION RATE: 16 BRPM | HEART RATE: 56 BPM | BODY MASS INDEX: 29.11 KG/M2 | HEIGHT: 72 IN

## 2024-07-25 DIAGNOSIS — Z01.818 PREOP TESTING: Primary | ICD-10-CM

## 2024-07-25 DIAGNOSIS — Z01.818 PREOP TESTING: ICD-10-CM

## 2024-07-25 LAB
BASOPHILS # BLD AUTO: 0.1 X10*3/UL (ref 0–0.1)
BASOPHILS NFR BLD AUTO: 1.3 %
EOSINOPHIL # BLD AUTO: 1.2 X10*3/UL (ref 0–0.7)
EOSINOPHIL NFR BLD AUTO: 15.8 %
ERYTHROCYTE [DISTWIDTH] IN BLOOD BY AUTOMATED COUNT: 13.2 % (ref 11.5–14.5)
EST. AVERAGE GLUCOSE BLD GHB EST-MCNC: 126 MG/DL
HBA1C MFR BLD: 6 %
HCT VFR BLD AUTO: 35.5 % (ref 41–52)
HGB BLD-MCNC: 11.6 G/DL (ref 13.5–17.5)
IMM GRANULOCYTES # BLD AUTO: 0.02 X10*3/UL (ref 0–0.7)
IMM GRANULOCYTES NFR BLD AUTO: 0.3 % (ref 0–0.9)
LYMPHOCYTES # BLD AUTO: 1.17 X10*3/UL (ref 1.2–4.8)
LYMPHOCYTES NFR BLD AUTO: 15.4 %
MCH RBC QN AUTO: 29.5 PG (ref 26–34)
MCHC RBC AUTO-ENTMCNC: 32.7 G/DL (ref 32–36)
MCV RBC AUTO: 90 FL (ref 80–100)
MONOCYTES # BLD AUTO: 0.55 X10*3/UL (ref 0.1–1)
MONOCYTES NFR BLD AUTO: 7.2 %
NEUTROPHILS # BLD AUTO: 4.56 X10*3/UL (ref 1.2–7.7)
NEUTROPHILS NFR BLD AUTO: 60 %
NRBC BLD-RTO: 0 /100 WBCS (ref 0–0)
PLATELET # BLD AUTO: 252 X10*3/UL (ref 150–450)
RBC # BLD AUTO: 3.93 X10*6/UL (ref 4.5–5.9)
WBC # BLD AUTO: 7.6 X10*3/UL (ref 4.4–11.3)

## 2024-07-25 PROCEDURE — 93005 ELECTROCARDIOGRAM TRACING: CPT | Performed by: PHYSICIAN ASSISTANT

## 2024-07-25 PROCEDURE — 87081 CULTURE SCREEN ONLY: CPT | Mod: AHULAB | Performed by: PHYSICIAN ASSISTANT

## 2024-07-25 PROCEDURE — 99204 OFFICE O/P NEW MOD 45 MIN: CPT | Performed by: PHYSICIAN ASSISTANT

## 2024-07-25 PROCEDURE — 83036 HEMOGLOBIN GLYCOSYLATED A1C: CPT

## 2024-07-25 PROCEDURE — 85025 COMPLETE CBC W/AUTO DIFF WBC: CPT

## 2024-07-25 PROCEDURE — 36415 COLL VENOUS BLD VENIPUNCTURE: CPT

## 2024-07-25 PROCEDURE — 93010 ELECTROCARDIOGRAM REPORT: CPT | Performed by: INTERNAL MEDICINE

## 2024-07-25 RX ORDER — CHLORHEXIDINE GLUCONATE ORAL RINSE 1.2 MG/ML
SOLUTION DENTAL
Qty: 475 ML | Refills: 0 | Status: SHIPPED | OUTPATIENT
Start: 2024-07-25

## 2024-07-25 ASSESSMENT — ENCOUNTER SYMPTOMS
RHINORRHEA: 0
ABDOMINAL DISTENTION: 0
TROUBLE SWALLOWING: 0
EXCESSIVE BLEEDING: 0
CONSTIPATION: 0
NUMBNESS: 0
EYE PAIN: 0
MYALGIAS: 0
EYE DISCHARGE: 0
DYSURIA: 0
WEAKNESS: 0
COUGH: 0
NECK PAIN: 0
ABDOMINAL PAIN: 0
LIMITED RANGE OF MOTION: 0
BLOOD IN STOOL: 0
WHEEZING: 0
DYSPNEA AT REST: 0
CONFUSION: 0
SINUS CONGESTION: 0
VOMITING: 0
ARTHRALGIAS: 1
DIFFICULTY URINATING: 0
WOUND: 0
PALPITATIONS: 0
DIARRHEA: 0
NAUSEA: 0
VISUAL CHANGE: 0
BRUISES/BLEEDS EASILY: 1
DOUBLE VISION: 0
FEVER: 0
CHILLS: 0
NECK STIFFNESS: 0
SHORTNESS OF BREATH: 0
SKIN CHANGES: 0
DYSPNEA WITH EXERTION: 0
LIGHT-HEADEDNESS: 0
UNEXPECTED WEIGHT CHANGE: 0
HEMOPTYSIS: 0

## 2024-07-25 ASSESSMENT — HOOS JR
SITTING: MODERATE
GOING UP OR DOWN STAIRS: MODERATE
HOOS JR TOTAL INTERVAL SCORE: 52.97
BENDING TO THE FLOOR TO PICK UP OBJECT: MODERATE
RISING FROM SITTING: MODERATE
LYING IN BED (TURNING OVER, MAINTAINING HIP POSITION): MODERATE
WALKING ON UNEVEN SURFACE: MODERATE
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
## 2024-07-25 NOTE — PREPROCEDURE INSTRUCTIONS
Medication List            Accurate as of July 25, 2024  1:55 PM. Always use your most recent med list.                atorvastatin 40 mg tablet  Commonly known as: Lipitor  Medication Adjustments for Surgery: Take morning of surgery with sip of water, no other fluids     chlorhexidine 0.12 % solution  Commonly known as: Peridex  Swish for 30 seconds and spit 15mL of solution the night before and morning of surgery     chlorthalidone 50 mg tablet  Commonly known as: Hygroton  Medication Adjustments for Surgery: Take morning of surgery with sip of water, no other fluids     cyanocobalamin 1,000 mcg/mL injection  Commonly known as: Vitamin B-12  Medication Adjustments for Surgery: Continue until night before surgery     diclofenac sodium 1 % gel  Commonly known as: Voltaren  Medication Adjustments for Surgery: Continue until night before surgery     gabapentin 600 mg tablet  Commonly known as: Neurontin  Medication Adjustments for Surgery: Take morning of surgery with sip of water, no other fluids     glucosamine-chondroitin 500-400 mg tablet  Medication Adjustments for Surgery: Stop 7 days before surgery     ibuprofen 200 mg tablet  Medication Adjustments for Surgery: Stop 7 days before surgery     * ketoconazole 2 % shampoo  Commonly known as: NIZOral  Medication Adjustments for Surgery: Continue until night before surgery     * ketoconazole 2 % cream  Commonly known as: NIZOral  Medication Adjustments for Surgery: Continue until night before surgery     lidocaine 5 % patch  Commonly known as: Lidoderm  Medication Adjustments for Surgery: Continue until night before surgery     metFORMIN  mg 24 hr tablet  Commonly known as: Glucophage-XR  Medication Adjustments for Surgery: Continue until night before surgery     metoprolol succinate  mg 24 hr tablet  Commonly known as: Toprol-XL  Medication Adjustments for Surgery: Take morning of surgery with sip of water, no other fluids     multivitamin  tablet  Medication Adjustments for Surgery: Stop 7 days before surgery     ramipril 10 mg capsule  Commonly known as: Altace  Notes to patient: HOLD evening prior to surgery and morning of surgery        thiamine 500 mg tablet  Commonly known as: Vitamin B-1  Medication Adjustments for Surgery: Continue until night before surgery     verapamil  mg 24 hr capsule  Commonly known as: Veralan PM  Medication Adjustments for Surgery: Take morning of surgery with sip of water, no other fluids     VITAMIN D3-VITAMIN K2 ORAL  Medication Adjustments for Surgery: Stop 7 days before surgery           * This list has 2 medication(s) that are the same as other medications prescribed for you. Read the directions carefully, and ask your doctor or other care provider to review them with you.                                **Concerning above medication instructions, if medication is normally taken at night, continue normal schedule.**  **DO NOT TAKE NIGHT PRIOR AND MORNING OF SURGERY**    CONTACT SURGEON'S OFFICE IF YOU DEVELOP:  * Fever = 100.4 F   * New respiratory symptoms (e.g. cough, shortness of breath, respiratory distress, sore throat)  * Recent loss of taste or smell  *Flu like symptoms such as headache, fatigue or gastrointestinal symptoms  * You develop any open sores, shingles, burning or painful urination   AND/OR:  * You no longer wish to have the surgery.  * Any other personal circumstances change that may lead to the need to cancel or defer this surgery.  *You were admitted to any hospital within one week of your planned procedure.    SMOKING:  *Quitting smoking can make a huge difference to your health and recovery from surgery.    *If you need help with quitting, call 4-438-QUIT-NOW.    THE DAY BEFORE SURGERY:   Nothing by mouth (no solids or liquids) after midnight.   If diabetic, please check fasting blood sugar upon waking up.    If fasting sugar is <80 mg/dl, please drink 100ml/3oz of apple juice no later  than 2 hours prior to arrival time.      SURGICAL TIME  *You will be contacted between 2 p.m. and 6 p.m. the business day before your surgery with your arrival time.  *If you haven't received a call by 6pm, call [phone].  *Scheduled surgery times may change and you will be notified if this occurs-check your personal voicemail for any updates.    ON THE MORNING OF SURGERY:  *Wear comfortable, loose fitting clothing.   *Do not use moisturizers, creams, lotions or perfume.  *All jewelry and valuables should be left at home.  *Prosthetic devices such as contact lenses, hearing aids, dentures, eyelash extensions, hairpins and body piercing must be removed before surgery.    BRING WITH YOU:  *Photo ID and insurance card  *Current list of medicines and allergies  *Pacemaker/Defibrillator/Heart stent cards  *CPAP machine and mask  *Slings/splints/crutches  *Copy of your complete Advanced Directive/DHPOA-if applicable  *Neurostimulator implant remote    PARKING AND ARRIVAL:  *Check in at the Main Entrance desk and let them know you are here for surgery.  *You will be directed to the 2nd floor surgical waiting area.    AFTER OUTPATIENT SURGERY:  *A responsible adult MUST accompany you at the time of discharge and stay with you for 24 hours after your surgery.  *You may NOT drive yourself home after surgery.  *You may use a taxi or ride sharing service ([employer], Uber) to return home ONLY if you are accompanied by a friend or family member.  *Instructions for resuming your medications will be provided by your surgeon.        Patient Information: Oral/Dental Rinse  **This is a prescription; pick it up at your preferred local pharmacy **  What is oral/dental rinse?   It is a mouthwash. It is a way of cleaning the mouth with a germ killing solution before your surgery.  The solution contains chlorhexidine, commonly known as CHG.   It is used inside the mouth to kill a bacteria known as Staphylococcus aureus.  Let your doctor  know if you are allergic to Chlorhexidine.    Why do I need to use CHG oral/dental rinse?  The CHG oral/dental rinse helps to kill a bacteria in your mouth known a Staphylococcus aureus.     This reduces the risk of infection at the surgical site.      Using your CHG oral/dental rinse  STEPS:  Use your CHG oral/dental rinse after you brush your teeth the night before (at bedtime) and the morning of your surgery.  Follow all directions on your prescription label.    Use the cap on the container to measure 15ml (fill cap to fill line)  Swish (gargle if you can) the mouthwash in your mouth for at least 30 seconds, (do not to swallow) spit out  After you use your CHG rinse, do not rinse your mouth with water, drink or eat.  Please refer to prescription label for the appropriate time to resume oral intake  Dental rinse comes in one size bottle: 473ml ~16oz.  You will have leftover    rinse, discard after this use.    What side effects might I have using the CHG oral/dental rinse?  CHG rinse will stick to plaque on the teeth.  Brush and floss just before use.  Teeth brushing will help avoid staining of plaque during use.    Who should I contact if I have questions about the CHG oral/dental rinse?  Please call [facility], Preadmission Testing at [phone] if you have any questions      Home Preoperative Antibacterial Shower     What is a home preoperative antibacterial shower?  This shower is a way of cleaning the skin with a germ killing soap before surgery.  The soap contains chlorhexidine, commonly known as CHG.  CHG is a soap for your skin with germ killing ability.  Let your doctor know if you are allergic to chlorhexidine.    Why do I need to take a preoperative antibacterial shower?  Skin is not sterile.  It is best to try to make your skin as free of germs as possible before surgery.  Proper cleansing with a germ killing soap before surgery can lower the number of germs on your  skin.  This helps to reduce the risk of infection at the surgical site.  Following the instructions listed below will help you prepare your skin for surgery.      How do I use the CHG skin cleanser?  Steps:  Begin using your CHG soap five days before your scheduled surgery on ________________________.    Days 1-4 Shower before bed:  Wash your face and genitals with your normal soap and rinse.    Wash and rinse your hair using the CHG soap. Rinse completely, do not condition your   Hair.          3.    Apply the CHG soap to a clean wet washcloth.  Turn the water off or move away                From the water spray to avoid premature rinsing of the CHG soap as you are applying.     4.   Lather your entire body from the neck down.  Do not use on your face or genitals.   Pay special attention to the area(s) where your incision(s) will be located unless they are on your face.  Avoid scrubbing your skin too hard.  The important point is to have the CHG soap sit on your skin for 3 minutes.    When the 3 minutes are up, turn on the water and rinse the CHG soap off your body completely.   Pat yourself dry with a clean, freshly-laundered towel.  Dress in clean, freshly laundered night clothes.    Be sure to sleep with clean, freshly laundered sheets.  Day 5:  Last shower is the morning before surgery: Follow above Instructions.    NOTE:    *Hair extensions should be removed    *Keep CHG soap out of eyes and ear canals   *DO NOT wash with regular soap on your body after you have used the CHG        soap solution  *DO NOT apply powders, lotions, or perfume.  *Deodorant may be used days 1-4, BUT NOT the day of surgery    Who should I contact if I have any questions regarding the use of CHG soap?  Call the [facility], Preadmission Testing at [phone] if you have any questions.              Patient Information: Pre-Operative Infection Prevention Measures     Why did I have my nose, under my arms  and groin swabbed?  The purpose of the swab is to identify Staphylococcus aureus inside your nose or on your skin.  The swab was sent to the laboratory for culture.  A positive swab/culture for Staphylococcus aureus is called colonization or carriage.      What is Staphylococcus aureus?  Staphylococcus aureus, also known as “staph”, is a germ found on the skin or in the nose of healthy people.  Sometimes Staphylococcus aureus can get into the body and cause an infection.  This can be minor (such as pimples, boils or other skin problems).  It might also be serious (such as blood infection, pneumonia or a surgical site infection).    What is Staphylococcus aureus colonization or carriage?  Colonization or carriage means that a person has the germ but is not sick from it.  These bacteria can be spread on the hands or when breathing or sneezing.    How is Staphylococcus aureus spread?  It is most often spread by close contact with a person or item that carries it.    What happens if my culture is positive for Staphylococcus aureus?  Your doctor/medical team will use this information to guide any antibiotic treatment which may be necessary.  Regardless of the culture results, we will clean the inside of your nose with a betadine swab just before you have your surgery.      Will I get an infection if I have Staphylococcus aureus in my nose or on my skin?  Anyone can get an infection with Staphylococcus aureus.  However, the best way to reduce your risk of infection is to follow the instructions provided to you for the use of your CHG soap and dental rinse.        Who should I contact if I have any questions?  Call the [facility], Preadmission Testing at [phone] if you have any questions.

## 2024-07-25 NOTE — GROUP NOTE
In addition to the group class activities, Otto Yen had the following lab work completed:  No orders of the defined types were placed in this encounter.    Otto Yen  attended joint class on 7/25 and Brought a care partner to the class. The preop survey was completed and the patient provided attestation that they understand the content reviewed and that they do have a care partner identified to assist with recovery. Patient was provided with a folder of materials and instructed to call orthopedic navigator with questions.     A new History and Physical was not completed.    This class lasted approximately 2 hours and had 10 participants. The nurse instructor covered the following topics:  Thank you for attending our Joint Replacement class today in preparation for your upcoming surgery.  Topics discussed include:    MyChart Enrollment  Communication with Care Team  My Chart is the best form of communication to reach all of your caregivers  You can send messages to specific care givers, or a care team  Continued Education  You will be enrolled in a Total Joint Replacement care plan to receive additional education before and after surgery  You can review a short recording of the class content  Access to Medical Records  You can access test results, office notes, appointments, etc.  You can connect to other healthcare systems who use [employer] ([facility], [facility], [facility], etc.)  [id number]  Program Information  Using Meds to Beds is our standard process for joint replacements at [facility] to minimize issues with homegoing medications. Please let us know ahead of time if there is a reason why Meds to Beds cannot be used    Background/Understanding of Joint Replacement Surgery  Potential for same day discharge  Any questions or concerns about your specific surgery/plan are to be directed to the surgeon's office    How to Prepare for Surgery  Use of Nicotine Products/Smoking  Stop several weeks before surgery  Such  products slow down the healing process and increase risk of post-op infection and complications  Clearance for Surgery  Medical Clearance by Specialists  Dental Clearance  Cracked/Broken/Loose teeth left untreated may postpone surgery  The importance of post-op antibiotics for dental visits per surgeon protocol  Preadmission Testing  **Potential for postponed surgery if appropriate clearance is not obtained  Medication Instruction  Follow instructions provided by the doctor who prescribes your medication (typically, but not limited to cardiologist)  Preadmission testing will provide additional instructions during your appointment on what to stop and what to take as you get closer to surgery  For clarification of these instructions, please call preadmission testing directly - [phone]  Tips for Preparing the home for discharge from the hospital  Care Partner  Requirement for surgery, the patient must have a plan to have help at home  Potential for postponed surgery if plan for home support cannot be established  How the care partner can help after surgery  CHG Body Wash/Mouth Wash  Follow the instructions given at preadmission testing  Body wash is to be used on the body and hair for 5 washes  Mouthwash is to be used the night before and morning of surgery  **This is a system-wide protocol developed by infectious disease professionals, we will not alter our recommendations for those with sensitive skin or those who have special hair needs.  Please follow the instructions as they are written as this will provide the best infection prevention measures for surgery.  Should you have an allergy to one of the products, please discuss with your preadmission team**    What to Expect in the Hospital/At Home  Morning of Surgery NPO Guidelines  Nothing to eat after midnight  Water can be consumed up to 2 hours prior to arrival  Surgical and Post-Surgical Care Team  Surgical Team  Anesthesia Team  Nursing  Physical  Therapy  Care Coordinating  Pharmacy  Hospital Arrival Instructions  Arrive at the time provided to you  Consider traffic patterns (rush-hour) based on arrival time  Have arrangements made for a ride home  If discharging same day, care partner should remain at the hospital  Recovering after Surgery  Recovery Room - Visitors are not brought back  Transition to hospital room - 2nd Floor, Visitors will be directed to your room  The presence of and strategies for controlling surgical pain and swelling  The importance of early mobility  Side effects after surgery  What to expect if staying overnight    Discharge Planning  The intended plan for discharge will be for patients to discharge home  All patients require a care partner (family, friend, neighbor, etc.) to stay with the patient for the first few nights after surgery  The inability to secure help at home will postpone surgery  Home Care Services set up per surgeon order  Physical Therapy  Occupational Therapy  **If desired, private duty care can be arranged by the patient ahead of time**  Outpatient Physical Therapy per surgeon order    Recovering at Home  Wound Care  Follow wound care instructions found in your discharge paperwork  Bandage is water-resistant and you may shower with the bandage  Do not scrub directly over the bandage  Do not submerge in water until cleared (bathtub, hot tub, pool, etc.)    Post-Op Risk Prevention  Infection Prevention  Promptly seek treatment for any infections post-operatively  Routine dental visits must be postponed for 3 months after surgery  Your surgeon may require antibiotics prior to future dental visits  Any concerns for infection not related directly to the knee or the hip should be managed by your primary care provider  Blood Clots  Be sure to complete the course of blood thinning medication as prescribed by your surgeon  Movement every 1-2 hours during the day is encouraged to prevent blood clots  Monitor for signs of  blood clots  Wear compression stockings as prescribed by your surgeon  Constipation  Constipation is common following surgery  Drink plenty of fluids  Take stool softener/laxative as prescribed by your surgeon  Move around frequently  Eat foods high in fiber  Fall Prevention  Prepare home ahead of time to clear space to move with walker  Remove throw rugs and electrical cords from walkways  Install railings near any stairways with more than 2 steps  Use night lights for increased visibility at night  Continue to use your assistive device until cleared by surgeon or physical therapy  Dislocation Prevention - Not all procedures will have dislocation precautions  Follow dislocation precautions provided by your surgeon  It is OK to resume sexual activity about 6 weeks following surgery  Be sure to follow any dislocation precautions assigned    Durable Medical Equipment  Cold Therapy  Breg Cold Therapy Machines  Ice/Gel Packs  Assistive Devices  Folding Walker with Wheels (in the front only)  No Rollators  Crutches if approved by Physical Therapy and Surgeon after surgery  Hip Kits  Raised Toilet Seats  Additional Compression Stockings    Joint Preservation  Healthy Activities when Cleared  Walking  Swimming  Bike Riding  Activities to Avoid  Refrain from repetitive motions which have a high impact on the joint  Gradual Progression  Progress activity slowly, listen to your body  Common Findings - NORMAL after surgery  Clicking/Grinding  Numbness near incision    Physical Therapy  Prehabilitation exercises  START TODAY ON BOTH LEGS  Surgery Specific Precautions  Follow surgery specific precautions found in your discharge paperwork    Follow-Up Visit  All patients will see their surgeon for a follow up visit after surgery  The visit may range from 2-6 weeks after surgery and is surgeon specific      Please don't hesitate to reach out if you have any additional questions or concerns.    Jitendra Talavera BSN, RN  Ria  Ayad BSN, RN-BC  Orthopedic Nurses  [facility]   [phone]

## 2024-07-25 NOTE — H&P (VIEW-ONLY)
[facility]/PAT Evaluation       Name: Otto Yen (Otto Yen)  /Age: 1960/64 y.o.         Date of Consult: 24     Referring Provider: Dr. Menchaca    Surgery, Date, and Length: Right Hip Total Arthroplasty ~ Anterior Approach - Right , 8/15/24, 180MIN    Otto Yen is a 64 year-old male who presents to the [facility] for perioperative risk assessment prior to surgery.    Patient presents with a primary diagnosis of right hip osteoarthritis. He refers to pain in b/l hips for the past 4 years.  He has tried PT and PT exercises which have not provided significant pain relief.  He has also tried NSAID and tylenol which provide minimal pain relief.  He uses lidocaine cream which helps somewhat as well.      This note was created in part upon personal review of patient's medical records.      Patient is scheduled to have Right Hip Total Arthroplasty ~ Anterior Approach - Right      Pt denies any past history of anesthetic complications such as PONV, awareness, prolonged sedation, dental damage, aspiration, cardiac arrest, difficult intubation, difficult I.V. access or unexpected hospital admissions.  NO malignant hyperthermia and or pseudocholinesterase deficiency.  No history of blood transfusions     The patient is not a Gnosticism and will accept blood and blood products if medically indicated.   Type and screen NOT sent.     Past Medical History:   Diagnosis Date    Alcohol abuse, in remission     Quit 2023    Anemia 06/10/2024    H/H 11.9/35.7    Arthritis     Cataract     Chronic kidney disease     GFR-55, Creat-1.43, BUN-36 6/10/24    Eczema     HLD (hyperlipidemia)     HTN (hypertension)     Hydrocephalus (Multi)     as child, shunt placed at age 1    Hypertension     Intraventricular hemorrhage (Multi)     spontaneous, craniotomy at age 29 for SDH evacuation    Irritable bowel syndrome     Liver disease     Liver enzymes normalized after stopped drinking alcohol     Pre-diabetes      on metformin, A1C= 5.9% on 9/22/22    Reactive airway disease (HHS-HCC)     no current inhaler use or need, used inhaler once 2/2 dust allergy    Shuffling gait     recent increase; seeing neurosurgery 8/5/24 for 2nd opinion    Sleep apnea     uses CPAP nightly, ? setting    Squamous cell skin cancer     head       Past Surgical History:   Procedure Laterality Date    CRANIOTOMY      SDH evacuation at age 29    CYSTOSCOPY  2011    KNEE Left 1972    patella removed    VENTRICULOPERITONEAL SHUNT Right 1961    age 1    VENTRICULOPERITONEAL SHUNT Right 1989    Revision    VENTRICULOPERITONEAL SHUNT Right 2006    Revision       Patient  reports that he is not currently sexually active and has had partner(s) who are female.    No family history on file.    Social History     Socioeconomic History    Marital status: Single     Spouse name: Not on file    Number of children: Not on file    Years of education: Not on file    Highest education level: Not on file   Occupational History    Not on file   Tobacco Use    Smoking status: Never    Smokeless tobacco: Never   Vaping Use    Vaping status: Never Used   Substance and Sexual Activity    Alcohol use: Not Currently     Comment: quit 12/2023, prevous 1 fifth scotch QD    Drug use: Not Currently    Sexual activity: Not Currently     Partners: Female   Other Topics Concern    Not on file   Social History Narrative    Not on file     Social Determinants of Health     Financial Resource Strain: Patient Declined (3/21/2024)    Received from [facility]    Overall Financial Resource Strain (CARDIA)     Difficulty of Paying Living Expenses: Patient declined   Food Insecurity: Patient Declined (3/21/2024)    Received from [facility]    Hunger Vital Sign     Worried About Running Out of Food in the Last Year: Patient declined     Ran Out of Food in the Last Year: Patient declined   Transportation Needs: Patient Declined (3/21/2024)    Received from  [employer]    PRAPARE - Transportation     Lack of Transportation (Medical): Patient declined     Lack of Transportation (Non-Medical): Patient declined   Physical Activity: Patient Declined (3/21/2024)    Received from [employer]    Exercise Vital Sign     Days of Exercise per Week: Patient declined     Minutes of Exercise per Session: Patient declined   Stress: No Stress Concern Present (12/2/2022)    Received from [facility] Occupational [facility] - Occupational Stress Questionnaire     Feeling of Stress : Only a little   Social Connections: Patient Declined (3/21/2024)    Received from [employer]    Social Connection and Isolation Panel [NHANES]     Frequency of Communication with Friends and Family: Patient declined     Frequency of Social Gatherings with Friends and Family: Patient declined     Attends Ep[employer] Services: Patient declined     Active Member of Clubs or Organizations: Patient declined     Attends Club or Organization Meetings: Patient declined     Marital Status: Patient declined   Intimate Partner Violence: Patient Declined (3/21/2024)    Received from [employer]    Humiliation, Afraid, Rape, and Kick questionnaire     Fear of Current or Ex-Partner: Patient declined     Emotionally Abused: Patient declined     Physically Abused: Patient declined     Sexually Abused: Patient declined   Housing Stability: Unknown (3/21/2024)    Received from [employer]    Housing Stability Vital Sign     Unable to Pay for Housing in the Last Year: Patient refused     Number of Places Lived in the Last Year: Not on file     Unstable Housing in the Last Year: Patient refused      Allergies   Allergen Reactions    Penicillin Hives     as a child-       Current Outpatient Medications:     atorvastatin (Lipitor) 40 mg tablet, Take 1 tablet (40 mg) by mouth once daily., Disp: , Rfl:     chlorthalidone  (Hygroton) 50 mg tablet, Take 1 tablet (50 mg) by mouth once daily., Disp: , Rfl:     cholecalciferol, vitD3,/vit K2 (VITAMIN D3-VITAMIN K2 ORAL), Take 1 tablet by mouth once daily., Disp: , Rfl:     cyanocobalamin (Vitamin B-12) 1,000 mcg/mL injection, Inject 1 mL (1,000 mcg) into the muscle every 30 (thirty) days., Disp: , Rfl:     diclofenac sodium (Voltaren) 1 % gel, APPLY 2 GRAMS TOPICALLY TO THE AFFECTED AREA UP TO FOUR TIMES DAILY, Disp: , Rfl:     gabapentin (Neurontin) 600 mg tablet, Take 1 tablet (600 mg) by mouth once daily at bedtime., Disp: , Rfl:     glucosamine-chondroitin 500-400 mg tablet, Take 1 tablet by mouth once daily., Disp: , Rfl:     ibuprofen 200 mg tablet, Take 2 tablets (400 mg) by mouth 2 times a day., Disp: , Rfl:     ketoconazole (NIZOral) 2 % shampoo, PLEASE SEE ATTACHED FOR DETAILED DIRECTIONS, Disp: , Rfl:     lidocaine (Lidoderm) 5 % patch, APPLY 1 PATCH TWICE A DAY BY TOPICAL ROUTE AS DIRECTED FOR 30 DAYS., Disp: , Rfl:     metoprolol succinate XL (Toprol-XL) 100 mg 24 hr tablet, Take 1 tablet (100 mg) by mouth once daily. Do not crush or chew., Disp: , Rfl:     multivitamin tablet, Take 1 tablet by mouth once daily., Disp: , Rfl:     ramipril (Altace) 10 mg capsule, Take 1 capsule (10 mg) by mouth once daily., Disp: , Rfl:     thiamine (Vitamin B-1) 500 mg tablet, Take by mouth., Disp: , Rfl:     verapamil ER (Veralan PM) 240 mg 24 hr capsule, Take 1 capsule (240 mg) by mouth once daily at bedtime. Do not crush or chew., Disp: , Rfl:     chlorhexidine (Peridex) 0.12 % solution, Swish for 30 seconds and spit 15mL of solution the night before and morning of surgery, Disp: 475 mL, Rfl: 0    ketoconazole (NIZOral) 2 % cream, APPLY ONCE DAILY TO RASH ON FACE AND EARS UNTIL CLEAR. REPEAT AS NEEDED FOR FLARES., Disp: , Rfl:     metFORMIN  mg 24 hr tablet, Take 1 tablet (500 mg) by mouth once daily. Do not crush, chew, or split., Disp: , Rfl:       PAT ROS:   Constitutional:    no  fever   no chills   no unexpected weight change  Neuro/Psych:    macrocephaly   no numbness   no weakness   no light-headedness   no confusion  Eyes:    no discharge   no pain   no vision loss   no diplopia   no visual disturbance   use of corrective lenses  Ears:    no ear pain   no hearing loss   no tinnitus  Nose:    no nasal discharge   no sinus congestion   no epistaxis  Mouth:    no dental issues   no mouth pain   no oral bleeding   no mouth lesions  Throat:    no throat pain   no dysphagia  Neck:    no neck pain   no neck stiffness  Cardio:    Functional 4 Mets. Patient denies SOB walking up 1 flights of stairs   Walking, swimming, cooking, cleaning, grocery shopping    no chest pain   no palpitations   no peripheral edema   no dyspnea   no NUNES  Respiratory:    no cough   no wheezing   no hemoptysis   no shortness of breath  Endocrine:    no cold intolerance   no heat intolerance  GI:    no abdominal distention   no abdominal pain   no constipation   no diarrhea   no nausea   no vomiting   no blood in stool  :    Urinary urgency with incontinence   no difficulty urinating   no dysuria   no oliguria   polyuria  Musculoskeletal:    arthralgias (b/l hips, b/l knees)   no myalgias   no decreased ROM  Hematologic:    bruises/bleeds easily   no excessive bleeding   no history of blood transfusion   no blood clots  Skin:   no skin changes   no sores/wound   no rash      Physical Exam  Constitutional:       General: He is not in acute distress.     Appearance: Normal appearance. He is not ill-appearing, toxic-appearing or diaphoretic.   HENT:      Head: Normocephalic and atraumatic.      Nose: Nose normal. No rhinorrhea.      Mouth/Throat:      Pharynx: No oropharyngeal exudate.   Eyes:      Extraocular Movements: Extraocular movements intact.      Conjunctiva/sclera: Conjunctivae normal.   Cardiovascular:      Rate and Rhythm: Normal rate and regular rhythm.      Heart sounds: No murmur heard.     No friction rub.  No gallop.      Comments: Functional 4 Mets. Patient denies SOB walking up 2 flights of stairs     Pulmonary:      Effort: Pulmonary effort is normal. No respiratory distress.      Breath sounds: Normal breath sounds. No stridor. No wheezing or rhonchi.   Abdominal:      General: Bowel sounds are normal. There is no distension.      Palpations: Abdomen is soft. There is no mass.      Tenderness: There is no abdominal tenderness. There is no guarding or rebound.      Hernia: No hernia is present.   Genitourinary:     Comments: Urinary urgency and frequency with incontinence  Musculoskeletal:         General: Tenderness (pain and decreased ROM  with external rotation of b/l hips) present. No swelling, deformity or signs of injury. Normal range of motion.      Cervical back: Normal range of motion and neck supple. No rigidity or tenderness.   Skin:     General: Skin is warm and dry.      Coloration: Skin is not jaundiced or pale.      Findings: No bruising, erythema, lesion or rash.   Neurological:      General: No focal deficit present.      Mental Status: He is alert and oriented to person, place, and time.      Cranial Nerves: No cranial nerve deficit.      Sensory: No sensory deficit.      Motor: No weakness.      Coordination: Coordination normal.      Gait: Gait abnormal.      Comments: Macrocephaly; new onset shuffling gait   Psychiatric:         Mood and Affect: Mood normal.         Behavior: Behavior normal.          PAT AIRWAY:   Airway:     Mallampati::  I    Neck ROM::  Full   No broken teeth, no dentures and no missing teeth          Visit Vitals  /71   Pulse 56   Temp 36.2 °C (97.2 °F)   Resp 16   Ht 1.829 m (6')   Wt 97.5 kg (214 lb 15.2 oz)   BMI 29.15 kg/m²   Smoking Status Never   BSA 2.23 m²        LABS:  Lab Results   Component Value Date    WBC 8.2 06/10/2024    HGB 11.9 (L) 06/10/2024    HCT 35.7 (L) 06/10/2024    MCV 89 06/10/2024     06/10/2024      Lab Results   Component Value  Date    WBC 7.6 07/25/2024    HGB 11.6 (L) 07/25/2024    HCT 35.5 (L) 07/25/2024    MCV 90 07/25/2024     07/25/2024      Lab Results   Component Value Date    GLUCOSE 81 06/10/2024    CALCIUM 9.6 06/10/2024     06/10/2024    K 4.2 06/10/2024    CO2 25 06/10/2024     06/10/2024    BUN 36 (H) 06/10/2024    CREATININE 1.43 (H) 06/10/2024      Lab Results   Component Value Date    ALT 14 06/10/2024    AST 16 06/10/2024    ALKPHOS 59 06/10/2024    BILITOT 0.4 06/10/2024      Lab Results   Component Value Date    HGBA1C 6.0 (H) 07/25/2024      EKG 7/25/24  Sinus bradycardia  Otherwise normal EKG  Vent rate 53 bpm    CT head 2/23/24  EXAMINATION: CT HEAD W/O CONTRAST 02/23/2024 03:13 PM   CLINICAL HISTORY:  shunt for congental hydrocephalus, last revision in 2006   ASSOCIATED DIAGNOSIS: Congenital hydrocephalus (HCC)   ORDERING PROVIDER: LORENZA CALLAHAN   TECHNOLOGISTS NOTE:   COMPARISON: CT HEAD W/O CONTRAST 5/24/2006, 12:53 PM   TECHNIQUE: Thin axial imaging of the head was performed without intravenous contrast.     FINDINGS:     Right parietal approach ventriculostomy catheter terminates in the posterior body of the right lateral ventricle. Persistent severe ventricular enlargement and colpocephaly. Unchanged severe cortical thinning most notably in the parietal lobes. Left greater than right hypodense subdural collections in the posterior fossa, similar to prior is suggestive of prominent cisterna magna.     No acute hemorrhage or CT evidence of acute territorial infarct.     Apart from multiple bone defects from prior ventriculostomy catheters, the skull is intact. Polypoid mucosal thickening in the maxillary sinuses. The tympanomastoid cavities are unopacified.     IMPRESSION:   Right parietal ventriculostomy catheter with severe hydrocephalus, and cortical thinning. Findings not significantly changed from 5/26/2006.     Assessment and Plan:     Patient is a 64-year-old male scheduled for a Right  "Hip Total Arthroplasty ~ Anterior Approach - Right with Dr. Menchaca on 8/15/24.    Patient has no active cardiac symptoms.   Patient denies any chest pain, tightness, heaviness, pressure, radiating pain, palpitations, irregular heartbeats, lightheadedness, cough, congestion, shortness of breath, NUNES, PND, near syncope, weight loss or gain.     RCRI  0  , 3.9 % Risk of MACE    Cardiac:  HTN - cont chlorthalidone , metoprolol on dos ; Ramipril HOLD evening prior to surgery and morning of surgery       Encouraged lifestyle modifications, low-sodium diet, and increase activity as tolerated.  Monitor BP and follow up with managing physician for readings sustaining >140/90.    HLD - cont statin on dos     Pulmonary:  Asthma - cont inhalers as prescribed if needed    MARCELA - Known and treated  MARCELA- Patient was instructed to bring CPAP machine the morning of surgery. Recommend prioritizing  nonopioid analgesic techniques (regional and local anesthesia, nonsteroidal medications, etc) before the administration of opioids and close monitoring for hypoventilation after surgery due to MARCELA. Increased vigilance is recommended with the use of narcotics due to an increased risk for opioid induced respiratory depression       Renal:  CKDIIIA  6/10/24 crt 1.43; GFR 55    Recommendations to avoid nephrotoxic drugs and carefully monitor fluid status to maintain euvolemia. Use dose adjusted medications as needed for the underlying level of renal function.     Neuro:  Hydrocephalus -  shunt placed at age 2yo ; shunt removed at 1.4yo, then shunt replaced at age 28yo at time of SDH- pt notes shuffling gait and neurology has placed referral to neuro-surg; Appt 7/31/24 with Dr. Tim Avina @ 1pm. Hold metformin dos. CT head from 2/2024 is stable.      Intraventricular hemorrhage - craniotomy at age 28yo for evacuation of SDH. Hold metformin dos     Pt seen by Dr. Tim Avina 7/31/24 and states:  \"There is no evidence of elevated " "intracranial pressure.  He is cleared for surgery.\"    Psych:  EtOH abuse in remission - Monitor for signs and symptoms of substance withdrawal during the postoperative period, assess, and treat as needed with the appropriate monitoring tool.     Hematology:  Anemia  6/10/24 H/H 11.9/35.7%  7/26/24 H/H 11.6/35.5%    Patient instructed to ambulate as soon as possible postoperatively to decrease thromboembolic risk.   Initiate mechanical DVT prophylaxis as soon as possible and initiate chemical prophylaxis when deemed safe from a bleeding standpoint post surgery.     LABS: CBC and CMP reviewed from 6/10/24 and show anemia.  CBC, MRSA, A1c and EKG ordered.     Followup: Labs, MRSA, A1c and neurosurg clearance pending    Addendum 7/26/24:  A1c and CBC results reviewed and show ongoing anemia.  A1c meets [facility] criteria to proceed with TJR as planned    Communication: Dr. Menchaca made aware of upcoming neuro-surg appointment as mentioned above    Addendum 8/1/24:  Pt cleared for surgery by neurosurg on 7/31    STOP BANG: +MARCELA    Caprini: 8    Risk assessment complete.  Patient is scheduled for a intermediate surgical risk procedure.        Preoperative medication instructions were provided and reviewed with the patient.  Any additional testing or evaluation was explained to the patient.  Nothing by mouth instructions were discussed and patient's questions were answered prior to conclusion to this encounter.  Patient verbalized understanding of preoperative instructions given in preadmission testing; discharge instructions available in EMR.    This note was dictated by a speech recognition.  Minor errors may have been detected in a speech recognition.       "

## 2024-07-25 NOTE — CPM/PAT NURSE NOTE
CPM/PAT Nurse Note      Name: Otot Yen (Otto Yen)  /Age: 1960/64 y.o.       Past Medical History:   Diagnosis Date    Alcohol abuse, in remission     Quit 2023    Anemia 06/10/2024    H/H 11.9/35.7    Arthritis     Cataract     Chronic kidney disease     GFR-55, Creat-1.43, BUN-36 6/10/24    Eczema     HLD (hyperlipidemia)     HTN (hypertension)     Hydrocephalus (Multi)     as child, shunt placed at age 1    Hypertension     Intraventricular hemorrhage (Multi)     spontaneous, craniotomy at age 29 for SDH evacuation    Irritable bowel syndrome     Liver disease     Liver enzymes normalized after stopped drinking alcohol     Pre-diabetes     on metformin, A1C= 5.9% on 22    Reactive airway disease (HHS-HCC)     no current inhaler use or need, used inhaler once 2/2 dust allergy    Shuffling gait     recent increase; seeing neurosurgery 24 for 2nd opinion    Sleep apnea     uses CPAP nightly, ? setting    Squamous cell skin cancer     head       Past Surgical History:   Procedure Laterality Date    CRANIOTOMY      SDH evacuation at age 29    CYSTOSCOPY      KNEE Left     patella removed    VENTRICULOPERITONEAL SHUNT Right     age 1    VENTRICULOPERITONEAL SHUNT Right     Revision    VENTRICULOPERITONEAL SHUNT Right     Revision       Patient  reports that he is not currently sexually active and has had partner(s) who are female.    No family history on file.    Allergies   Allergen Reactions    Penicillin Hives     as a child-       Prior to Admission medications    Medication Sig Start Date End Date Taking? Authorizing Provider   atorvastatin (Lipitor) 40 mg tablet Take 1 tablet (40 mg) by mouth once daily.    Historical Provider, MD   chlorhexidine (Peridex) 0.12 % solution Swish for 30 seconds and spit 15mL of solution the night before and morning of surgery 24   Tiffany Enriquez PA-C   chlorthalidone (Hygroton) 50 mg tablet Take 1 tablet (50 mg) by mouth  once daily.    Historical Provider, MD   cholecalciferol, vitD3,/vit K2 (VITAMIN D3-VITAMIN K2 ORAL) Take 1 tablet by mouth once daily.    Historical Provider, MD   cyanocobalamin (Vitamin B-12) 1,000 mcg/mL injection Inject 1 mL (1,000 mcg) into the muscle every 30 (thirty) days.    Historical Provider, MD   diclofenac sodium (Voltaren) 1 % gel APPLY 2 GRAMS TOPICALLY TO THE AFFECTED AREA UP TO FOUR TIMES DAILY 10/17/23   Historical Provider, MD   gabapentin (Neurontin) 600 mg tablet Take 1 tablet (600 mg) by mouth once daily at bedtime.    Historical Provider, MD   glucosamine-chondroitin 500-400 mg tablet Take 1 tablet by mouth once daily.    Historical Provider, MD   ibuprofen 200 mg tablet Take 2 tablets (400 mg) by mouth 2 times a day.    Historical Provider, MD   ketoconazole (NIZOral) 2 % cream APPLY ONCE DAILY TO RASH ON FACE AND EARS UNTIL CLEAR. REPEAT AS NEEDED FOR FLARES.    Historical Provider, MD   ketoconazole (NIZOral) 2 % shampoo PLEASE SEE ATTACHED FOR DETAILED DIRECTIONS    Historical Provider, MD   lidocaine (Lidoderm) 5 % patch APPLY 1 PATCH TWICE A DAY BY TOPICAL ROUTE AS DIRECTED FOR 30 DAYS. 6/21/24   Historical Provider, MD   metFORMIN  mg 24 hr tablet Take 1 tablet (500 mg) by mouth once daily. Do not crush, chew, or split.    Historical Provider, MD   metoprolol succinate XL (Toprol-XL) 100 mg 24 hr tablet Take 1 tablet (100 mg) by mouth once daily. Do not crush or chew.    Historical Provider, MD   multivitamin tablet Take 1 tablet by mouth once daily.    Historical Provider, MD   ramipril (Altace) 10 mg capsule Take 1 capsule (10 mg) by mouth once daily.    Historical Provider, MD   thiamine (Vitamin B-1) 500 mg tablet Take by mouth.    Historical Provider, MD   verapamil ER (Veralan PM) 240 mg 24 hr capsule Take 1 capsule (240 mg) by mouth once daily at bedtime. Do not crush or chew.    Historical Provider, MD   naltrexone (Depade) 50 mg tablet Take 1 tablet (50 mg) by mouth once  daily at bedtime.  7/24/24  Historical Provider, MD CHIRAG FORDE Risk Score    No data to display       Caprini DVT Assessment    No data to display       Modified Frailty Index    No data to display       CHADS2 Stroke Risk  Current as of 3 hours ago        N/A 3 to 100%: High Risk   2 to < 3%: Medium Risk   0 to < 2%: Low Risk     Last Change: N/A          This score determines the patient's risk of having a stroke if the patient has atrial fibrillation.        This score is not applicable to this patient. Components are not calculated.          Revised Cardiac Risk Index    No data to display       Apfel Simplified Score    No data to display       Risk Analysis Index Results This Encounter    No data found in the last 1 encounters.         Nurse Plan of Action:     After Visit Summary (AVS) reviewed and patient verbalized good understanding of medications and NPO instructions.  Pre-op infection prevention measures:  CHG showers and mouthwash reviewed, understanding voiced.  CHG soap given and patient verbalized need to pick CHG mouthwash at their preferred local pharmacy.

## 2024-07-25 NOTE — H&P (VIEW-ONLY)
[facility]/PAT Evaluation       Name: Otto Yen (Otto Yen)  /Age: 1960/64 y.o.         Date of Consult: 24     Referring Provider: Dr. Menchaca    Surgery, Date, and Length: Right Hip Total Arthroplasty ~ Anterior Approach - Right , 8/15/24, 180MIN    Otto Yen is a 64 year-old male who presents to the [facility] for perioperative risk assessment prior to surgery.    Patient presents with a primary diagnosis of right hip osteoarthritis. He refers to pain in b/l hips for the past 4 years.  He has tried PT and PT exercises which have not provided significant pain relief.  He has also tried NSAID and tylenol which provide minimal pain relief.  He uses lidocaine cream which helps somewhat as well.      This note was created in part upon personal review of patient's medical records.      Patient is scheduled to have Right Hip Total Arthroplasty ~ Anterior Approach - Right      Pt denies any past history of anesthetic complications such as PONV, awareness, prolonged sedation, dental damage, aspiration, cardiac arrest, difficult intubation, difficult I.V. access or unexpected hospital admissions.  NO malignant hyperthermia and or pseudocholinesterase deficiency.  No history of blood transfusions     The patient is not a [employer] and will accept blood and blood products if medically indicated.   Type and screen NOT sent.     Past Medical History:   Diagnosis Date    Alcohol abuse, in remission     Quit 2023    Anemia 06/10/2024    H/H 11.9/35.7    Arthritis     Cataract     Chronic kidney disease     GFR-55, Creat-1.43, BUN-36 6/10/24    Eczema     HLD (hyperlipidemia)     HTN (hypertension)     Hydrocephalus (Multi)     as child, shunt placed at age 1    Hypertension     Intraventricular hemorrhage (Multi)     spontaneous, craniotomy at age 29 for SDH evacuation    Irritable bowel syndrome     Liver disease     Liver enzymes normalized after stopped drinking alcohol     Pre-diabetes      on metformin, A1C= 5.9% on 9/22/22    Reactive airway disease (HHS-HCC)     no current inhaler use or need, used inhaler once 2/2 dust allergy    Shuffling gait     recent increase; seeing neurosurgery 8/5/24 for 2nd opinion    Sleep apnea     uses CPAP nightly, ? setting    Squamous cell skin cancer     head       Past Surgical History:   Procedure Laterality Date    CRANIOTOMY      SDH evacuation at age 29    CYSTOSCOPY  2011    KNEE Left 1972    patella removed    VENTRICULOPERITONEAL SHUNT Right 1961    age 1    VENTRICULOPERITONEAL SHUNT Right 1989    Revision    VENTRICULOPERITONEAL SHUNT Right 2006    Revision       Patient  reports that he is not currently sexually active and has had partner(s) who are female.    No family history on file.    Social History     Socioeconomic History    Marital status: Single     Spouse name: Not on file    Number of children: Not on file    Years of education: Not on file    Highest education level: Not on file   Occupational History    Not on file   Tobacco Use    Smoking status: Never    Smokeless tobacco: Never   Vaping Use    Vaping status: Never Used   Substance and Sexual Activity    Alcohol use: Not Currently     Comment: quit 12/2023, prevous 1 fifth scotch QD    Drug use: Not Currently    Sexual activity: Not Currently     Partners: Female   Other Topics Concern    Not on file   Social History Narrative    Not on file     Social Determinants of Health     Financial Resource Strain: Patient Declined (3/21/2024)    Received from [employer]    Overall Financial Resource Strain (CARDIA)     Difficulty of Paying Living Expenses: Patient declined   Food Insecurity: Patient Declined (3/21/2024)    Received from [employer]    Hunger Vital Sign     Worried About Running Out of Food in the Last Year: Patient declined     Ran Out of Food in the Last Year: Patient declined   Transportation Needs: Patient Declined (3/21/2024)    Received from  [employer]    PRAPARE - Transportation     Lack of Transportation (Medical): Patient declined     Lack of Transportation (Non-Medical): Patient declined   Physical Activity: Patient Declined (3/21/2024)    Received from [employer]    Exercise Vital Sign     Days of Exercise per Week: Patient declined     Minutes of Exercise per Session: Patient declined   Stress: No Stress Concern Present (12/2/2022)    Received from [facility] - Occupational Stress Questionnaire     Feeling of Stress : Only a little   Social Connections: Patient Declined (3/21/2024)    Received from [employer]    Social Connection and Isolation Panel [NHANES]     Frequency of Communication with Friends and Family: Patient declined     Frequency of Social Gatherings with Friends and Family: Patient declined     Attends [employer] Services: Patient declined     Active Member of Clubs or Organizations: Patient declined     Attends Club or Organization Meetings: Patient declined     Marital Status: Patient declined   Intimate Partner Violence: Patient Declined (3/21/2024)    Received from [employer]    Humiliation, Afraid, Rape, and Kick questionnaire     Fear of Current or Ex-Partner: Patient declined     Emotionally Abused: Patient declined     Physically Abused: Patient declined     Sexually Abused: Patient declined   Housing Stability: Unknown (3/21/2024)    Received from [employer]    Housing Stability Vital Sign     Unable to Pay for Housing in the Last Year: Patient refused     Number of Places Lived in the Last Year: Not on file     Unstable Housing in the Last Year: Patient refused      Allergies   Allergen Reactions    Penicillin Hives     as a child-       Current Outpatient Medications:     atorvastatin (Lipitor) 40 mg tablet, Take 1 tablet (40 mg) by mouth once daily., Disp: , Rfl:     chlorthalidone  (Hygroton) 50 mg tablet, Take 1 tablet (50 mg) by mouth once daily., Disp: , Rfl:     cholecalciferol, vitD3,/vit K2 (VITAMIN D3-VITAMIN K2 ORAL), Take 1 tablet by mouth once daily., Disp: , Rfl:     cyanocobalamin (Vitamin B-12) 1,000 mcg/mL injection, Inject 1 mL (1,000 mcg) into the muscle every 30 (thirty) days., Disp: , Rfl:     diclofenac sodium (Voltaren) 1 % gel, APPLY 2 GRAMS TOPICALLY TO THE AFFECTED AREA UP TO FOUR TIMES DAILY, Disp: , Rfl:     gabapentin (Neurontin) 600 mg tablet, Take 1 tablet (600 mg) by mouth once daily at bedtime., Disp: , Rfl:     glucosamine-chondroitin 500-400 mg tablet, Take 1 tablet by mouth once daily., Disp: , Rfl:     ibuprofen 200 mg tablet, Take 2 tablets (400 mg) by mouth 2 times a day., Disp: , Rfl:     ketoconazole (NIZOral) 2 % shampoo, PLEASE SEE ATTACHED FOR DETAILED DIRECTIONS, Disp: , Rfl:     lidocaine (Lidoderm) 5 % patch, APPLY 1 PATCH TWICE A DAY BY TOPICAL ROUTE AS DIRECTED FOR 30 DAYS., Disp: , Rfl:     metoprolol succinate XL (Toprol-XL) 100 mg 24 hr tablet, Take 1 tablet (100 mg) by mouth once daily. Do not crush or chew., Disp: , Rfl:     multivitamin tablet, Take 1 tablet by mouth once daily., Disp: , Rfl:     ramipril (Altace) 10 mg capsule, Take 1 capsule (10 mg) by mouth once daily., Disp: , Rfl:     thiamine (Vitamin B-1) 500 mg tablet, Take by mouth., Disp: , Rfl:     verapamil ER (Veralan PM) 240 mg 24 hr capsule, Take 1 capsule (240 mg) by mouth once daily at bedtime. Do not crush or chew., Disp: , Rfl:     chlorhexidine (Peridex) 0.12 % solution, Swish for 30 seconds and spit 15mL of solution the night before and morning of surgery, Disp: 475 mL, Rfl: 0    ketoconazole (NIZOral) 2 % cream, APPLY ONCE DAILY TO RASH ON FACE AND EARS UNTIL CLEAR. REPEAT AS NEEDED FOR FLARES., Disp: , Rfl:     metFORMIN  mg 24 hr tablet, Take 1 tablet (500 mg) by mouth once daily. Do not crush, chew, or split., Disp: , Rfl:       PAT ROS:   Constitutional:    no  fever   no chills   no unexpected weight change  Neuro/Psych:    macrocephaly   no numbness   no weakness   no light-headedness   no confusion  Eyes:    no discharge   no pain   no vision loss   no diplopia   no visual disturbance   use of corrective lenses  Ears:    no ear pain   no hearing loss   no tinnitus  Nose:    no nasal discharge   no sinus congestion   no epistaxis  Mouth:    no dental issues   no mouth pain   no oral bleeding   no mouth lesions  Throat:    no throat pain   no dysphagia  Neck:    no neck pain   no neck stiffness  Cardio:    Functional 4 Mets. Patient denies SOB walking up 1 flights of stairs   Walking, swimming, cooking, cleaning, grocery shopping    no chest pain   no palpitations   no peripheral edema   no dyspnea   no NUNES  Respiratory:    no cough   no wheezing   no hemoptysis   no shortness of breath  Endocrine:    no cold intolerance   no heat intolerance  GI:    no abdominal distention   no abdominal pain   no constipation   no diarrhea   no nausea   no vomiting   no blood in stool  :    Urinary urgency with incontinence   no difficulty urinating   no dysuria   no oliguria   polyuria  Musculoskeletal:    arthralgias (b/l hips, b/l knees)   no myalgias   no decreased ROM  Hematologic:    bruises/bleeds easily   no excessive bleeding   no history of blood transfusion   no blood clots  Skin:   no skin changes   no sores/wound   no rash      Physical Exam  Constitutional:       General: He is not in acute distress.     Appearance: Normal appearance. He is not ill-appearing, toxic-appearing or diaphoretic.   HENT:      Head: Normocephalic and atraumatic.      Nose: Nose normal. No rhinorrhea.      Mouth/Throat:      Pharynx: No oropharyngeal exudate.   Eyes:      Extraocular Movements: Extraocular movements intact.      Conjunctiva/sclera: Conjunctivae normal.   Cardiovascular:      Rate and Rhythm: Normal rate and regular rhythm.      Heart sounds: No murmur heard.     No friction rub.  No gallop.      Comments: Functional 4 Mets. Patient denies SOB walking up 2 flights of stairs     Pulmonary:      Effort: Pulmonary effort is normal. No respiratory distress.      Breath sounds: Normal breath sounds. No stridor. No wheezing or rhonchi.   Abdominal:      General: Bowel sounds are normal. There is no distension.      Palpations: Abdomen is soft. There is no mass.      Tenderness: There is no abdominal tenderness. There is no guarding or rebound.      Hernia: No hernia is present.   Genitourinary:     Comments: Urinary urgency and frequency with incontinence  Musculoskeletal:         General: Tenderness (pain and decreased ROM  with external rotation of b/l hips) present. No swelling, deformity or signs of injury. Normal range of motion.      Cervical back: Normal range of motion and neck supple. No rigidity or tenderness.   Skin:     General: Skin is warm and dry.      Coloration: Skin is not jaundiced or pale.      Findings: No bruising, erythema, lesion or rash.   Neurological:      General: No focal deficit present.      Mental Status: He is alert and oriented to person, place, and time.      Cranial Nerves: No cranial nerve deficit.      Sensory: No sensory deficit.      Motor: No weakness.      Coordination: Coordination normal.      Gait: Gait abnormal.      Comments: Macrocephaly; new onset shuffling gait   Psychiatric:         Mood and Affect: Mood normal.         Behavior: Behavior normal.          PAT AIRWAY:   Airway:     Mallampati::  I    Neck ROM::  Full   No broken teeth, no dentures and no missing teeth          Visit Vitals  /71   Pulse 56   Temp 36.2 °C (97.2 °F)   Resp 16   Ht 1.829 m (6')   Wt 97.5 kg (214 lb 15.2 oz)   BMI 29.15 kg/m²   Smoking Status Never   BSA 2.23 m²        LABS:  Lab Results   Component Value Date    WBC 8.2 06/10/2024    HGB 11.9 (L) 06/10/2024    HCT 35.7 (L) 06/10/2024    MCV 89 06/10/2024     06/10/2024      Lab Results   Component Value  Date    WBC 7.6 07/25/2024    HGB 11.6 (L) 07/25/2024    HCT 35.5 (L) 07/25/2024    MCV 90 07/25/2024     07/25/2024      Lab Results   Component Value Date    GLUCOSE 81 06/10/2024    CALCIUM 9.6 06/10/2024     06/10/2024    K 4.2 06/10/2024    CO2 25 06/10/2024     06/10/2024    BUN 36 (H) 06/10/2024    CREATININE 1.43 (H) 06/10/2024      Lab Results   Component Value Date    ALT 14 06/10/2024    AST 16 06/10/2024    ALKPHOS 59 06/10/2024    BILITOT 0.4 06/10/2024      Lab Results   Component Value Date    HGBA1C 6.0 (H) 07/25/2024      EKG 7/25/24  Sinus bradycardia  Otherwise normal EKG  Vent rate 53 bpm    CT head 2/23/24  EXAMINATION: CT HEAD W/O CONTRAST 02/23/2024 03:13 PM   CLINICAL HISTORY:  shunt for congental hydrocephalus, last revision in 2006   ASSOCIATED DIAGNOSIS: Congenital hydrocephalus (HCC)   ORDERING PROVIDER: LORENZA CALLAHAN   TECHNOLOGISTS NOTE:   COMPARISON: CT HEAD W/O CONTRAST 5/24/2006, 12:53 PM   TECHNIQUE: Thin axial imaging of the head was performed without intravenous contrast.     FINDINGS:     Right parietal approach ventriculostomy catheter terminates in the posterior body of the right lateral ventricle. Persistent severe ventricular enlargement and colpocephaly. Unchanged severe cortical thinning most notably in the parietal lobes. Left greater than right hypodense subdural collections in the posterior fossa, similar to prior is suggestive of prominent cisterna magna.     No acute hemorrhage or CT evidence of acute territorial infarct.     Apart from multiple bone defects from prior ventriculostomy catheters, the skull is intact. Polypoid mucosal thickening in the maxillary sinuses. The tympanomastoid cavities are unopacified.     IMPRESSION:   Right parietal ventriculostomy catheter with severe hydrocephalus, and cortical thinning. Findings not significantly changed from 5/26/2006.     Assessment and Plan:     Patient is a 64-year-old male scheduled for a Right  "Hip Total Arthroplasty ~ Anterior Approach - Right with Dr. Menchaca on 8/15/24.    Patient has no active cardiac symptoms.   Patient denies any chest pain, tightness, heaviness, pressure, radiating pain, palpitations, irregular heartbeats, lightheadedness, cough, congestion, shortness of breath, NUNES, PND, near syncope, weight loss or gain.     RCRI  0  , 3.9 % Risk of MACE    Cardiac:  HTN - cont chlorthalidone , metoprolol on dos ; Ramipril HOLD evening prior to surgery and morning of surgery       Encouraged lifestyle modifications, low-sodium diet, and increase activity as tolerated.  Monitor BP and follow up with managing physician for readings sustaining >140/90.    HLD - cont statin on dos     Pulmonary:  Asthma - cont inhalers as prescribed if needed    MARCELA - Known and treated  MARCELA- Patient was instructed to bring CPAP machine the morning of surgery. Recommend prioritizing  nonopioid analgesic techniques (regional and local anesthesia, nonsteroidal medications, etc) before the administration of opioids and close monitoring for hypoventilation after surgery due to MARCELA. Increased vigilance is recommended with the use of narcotics due to an increased risk for opioid induced respiratory depression       Renal:  CKDIIIA  6/10/24 crt 1.43; GFR 55    Recommendations to avoid nephrotoxic drugs and carefully monitor fluid status to maintain euvolemia. Use dose adjusted medications as needed for the underlying level of renal function.     Neuro:  Hydrocephalus -  shunt placed at age 2yo ; shunt removed at 1.6yo, then shunt replaced at age 30yo at time of SDH- pt notes shuffling gait and neurology has placed referral to neuro-surg; Appt 7/31/24 with Dr. Tim Avina @ 1pm. Hold metformin dos. CT head from 2/2024 is stable.      Intraventricular hemorrhage - craniotomy at age 30yo for evacuation of SDH. Hold metformin dos     Pt seen by Dr. Tim Avina 7/31/24 and states:  \"There is no evidence of elevated " "intracranial pressure.  He is cleared for surgery.\"    Psych:  EtOH abuse in remission - Monitor for signs and symptoms of substance withdrawal during the postoperative period, assess, and treat as needed with the appropriate monitoring tool.     Hematology:  Anemia  6/10/24 H/H 11.9/35.7%  7/26/24 H/H 11.6/35.5%    Patient instructed to ambulate as soon as possible postoperatively to decrease thromboembolic risk.   Initiate mechanical DVT prophylaxis as soon as possible and initiate chemical prophylaxis when deemed safe from a bleeding standpoint post surgery.     LABS: CBC and CMP reviewed from 6/10/24 and show anemia.  CBC, MRSA, A1c and EKG ordered.     Followup: Labs, MRSA, A1c and neurosurg clearance pending    Addendum 7/26/24:  A1c and CBC results reviewed and show ongoing anemia.  A1c meets [facility] criteria to proceed with TJR as planned    Communication: Dr. Menchaca made aware of upcoming neuro-surg appointment as mentioned above    Addendum 8/1/24:  Pt cleared for surgery by neurosurg on 7/31    STOP BANG: +MARCELA    Caprini: 8    Risk assessment complete.  Patient is scheduled for a intermediate surgical risk procedure.        Preoperative medication instructions were provided and reviewed with the patient.  Any additional testing or evaluation was explained to the patient.  Nothing by mouth instructions were discussed and patient's questions were answered prior to conclusion to this encounter.  Patient verbalized understanding of preoperative instructions given in preadmission testing; discharge instructions available in EMR.    This note was dictated by a speech recognition.  Minor errors may have been detected in a speech recognition.       "

## 2024-07-26 LAB
ATRIAL RATE: 53 BPM
P AXIS: 73 DEGREES
P OFFSET: 177 MS
P ONSET: 115 MS
PR INTERVAL: 198 MS
Q ONSET: 214 MS
QRS COUNT: 9 BEATS
QRS DURATION: 116 MS
QT INTERVAL: 442 MS
QTC CALCULATION(BAZETT): 414 MS
QTC FREDERICIA: 424 MS
R AXIS: 61 DEGREES
T AXIS: 65 DEGREES
T OFFSET: 435 MS
VENTRICULAR RATE: 53 BPM

## 2024-07-27 DIAGNOSIS — M16.12 PRIMARY OSTEOARTHRITIS OF LEFT HIP: ICD-10-CM

## 2024-07-27 DIAGNOSIS — M16.11 PRIMARY OSTEOARTHRITIS OF RIGHT HIP: Primary | ICD-10-CM

## 2024-07-27 LAB — STAPHYLOCOCCUS SPEC CULT: ABNORMAL

## 2024-07-27 RX ORDER — MUPIROCIN 20 MG/G
OINTMENT TOPICAL 2 TIMES DAILY
Qty: 50 G | Refills: 0 | Status: SHIPPED | OUTPATIENT
Start: 2024-07-27 | End: 2024-08-01

## 2024-07-31 ENCOUNTER — APPOINTMENT (OUTPATIENT)
Dept: NEUROSURGERY | Facility: CLINIC | Age: 64
End: 2024-07-31
Payer: COMMERCIAL

## 2024-07-31 ENCOUNTER — OFFICE VISIT (OUTPATIENT)
Dept: NEUROSURGERY | Facility: HOSPITAL | Age: 64
End: 2024-07-31
Payer: COMMERCIAL

## 2024-07-31 VITALS
HEART RATE: 48 BPM | HEIGHT: 72 IN | RESPIRATION RATE: 19 BRPM | BODY MASS INDEX: 28.99 KG/M2 | DIASTOLIC BLOOD PRESSURE: 69 MMHG | WEIGHT: 214 LBS | SYSTOLIC BLOOD PRESSURE: 119 MMHG

## 2024-07-31 DIAGNOSIS — Q03.9 CONGENITAL HYDROCEPHALUS (MULTI): ICD-10-CM

## 2024-07-31 DIAGNOSIS — Q03.9 HYDROCEPHALUS, CONGENITAL (MULTI): Primary | ICD-10-CM

## 2024-07-31 PROCEDURE — 1036F TOBACCO NON-USER: CPT | Performed by: NEUROLOGICAL SURGERY

## 2024-07-31 PROCEDURE — 99202 OFFICE O/P NEW SF 15 MIN: CPT | Performed by: NEUROLOGICAL SURGERY

## 2024-07-31 PROCEDURE — 99212 OFFICE O/P EST SF 10 MIN: CPT | Performed by: NEUROLOGICAL SURGERY

## 2024-07-31 PROCEDURE — 3008F BODY MASS INDEX DOCD: CPT | Performed by: NEUROLOGICAL SURGERY

## 2024-07-31 ASSESSMENT — ENCOUNTER SYMPTOMS
GASTROINTESTINAL NEGATIVE: 1
CONFUSION: 1
CONSTITUTIONAL NEGATIVE: 1
BACK PAIN: 1
CARDIOVASCULAR NEGATIVE: 1
NUMBNESS: 1
EYES NEGATIVE: 1
BRUISES/BLEEDS EASILY: 1
ENDOCRINE NEGATIVE: 1
APNEA: 1

## 2024-07-31 ASSESSMENT — PAIN SCALES - GENERAL: PAINLEVEL: 4

## 2024-07-31 NOTE — PROGRESS NOTES
History of shunted congenital hydrocephalus. Had shunt when was young and then removed for infection. His shunt was placed 1989. Had SDH in Sept. Had CT at [facility]. States the ventricles are enlarged.    64-year-old man with history of shunted congenital hydrocephalus presents for evaluation.  The patient states that he had a shunt placed when he was very young.  It was subsequently removed for infection and then was replaced in 1989.  The patient also had a subdural hematoma that year.  He has not had any surgeries on his shunt since then.  He denies any headaches but his acquaintances note that he has been having worsening problems with balance and memory.    Review of Systems   Constitutional: Negative.    HENT: Negative.     Eyes: Negative.    Respiratory:  Positive for apnea.    Cardiovascular: Negative.    Gastrointestinal: Negative.    Endocrine: Negative.    Genitourinary:  Positive for urgency.   Musculoskeletal:  Positive for back pain and gait problem.   Skin: Negative.    Allergic/Immunologic: Positive for food allergies.   Neurological:  Positive for numbness.   Hematological:  Bruises/bleeds easily.   Psychiatric/Behavioral:  Positive for confusion.        Visit Vitals  /69   Pulse (!) 48   Resp 19   Ht 1.829 m (6')   Wt 97.1 kg (214 lb)   BMI 29.02 kg/m²   Smoking Status Never   BSA 2.22 m²           Current Outpatient Medications:     atorvastatin (Lipitor) 40 mg tablet, Take 1 tablet (40 mg) by mouth once daily., Disp: , Rfl:     chlorhexidine (Peridex) 0.12 % solution, Swish for 30 seconds and spit 15mL of solution the night before and morning of surgery, Disp: 475 mL, Rfl: 0    chlorthalidone (Hygroton) 50 mg tablet, Take 1 tablet (50 mg) by mouth once daily., Disp: , Rfl:     cholecalciferol, vitD3,/vit K2 (VITAMIN D3-VITAMIN K2 ORAL), Take 1 tablet by mouth once daily., Disp: , Rfl:     cyanocobalamin (Vitamin B-12) 1,000 mcg/mL injection, Inject 1 mL (1,000 mcg) into the muscle every 30  (thirty) days., Disp: , Rfl:     diclofenac sodium (Voltaren) 1 % gel, APPLY 2 GRAMS TOPICALLY TO THE AFFECTED AREA UP TO FOUR TIMES DAILY, Disp: , Rfl:     gabapentin (Neurontin) 600 mg tablet, Take 1 tablet (600 mg) by mouth once daily at bedtime., Disp: , Rfl:     glucosamine-chondroitin 500-400 mg tablet, Take 1 tablet by mouth once daily., Disp: , Rfl:     ibuprofen 200 mg tablet, Take 2 tablets (400 mg) by mouth 2 times a day., Disp: , Rfl:     ketoconazole (NIZOral) 2 % cream, APPLY ONCE DAILY TO RASH ON FACE AND EARS UNTIL CLEAR. REPEAT AS NEEDED FOR FLARES., Disp: , Rfl:     ketoconazole (NIZOral) 2 % shampoo, PLEASE SEE ATTACHED FOR DETAILED DIRECTIONS, Disp: , Rfl:     lidocaine (Lidoderm) 5 % patch, APPLY 1 PATCH TWICE A DAY BY TOPICAL ROUTE AS DIRECTED FOR 30 DAYS., Disp: , Rfl:     metFORMIN  mg 24 hr tablet, Take 1 tablet (500 mg) by mouth once daily. Do not crush, chew, or split., Disp: , Rfl:     metoprolol succinate XL (Toprol-XL) 100 mg 24 hr tablet, Take 1 tablet (100 mg) by mouth once daily. Do not crush or chew., Disp: , Rfl:     multivitamin tablet, Take 1 tablet by mouth once daily., Disp: , Rfl:     mupirocin (Bactroban) 2 % ointment, Apply topically 2 times a day for 5 days., Disp: 50 g, Rfl: 0    ramipril (Altace) 10 mg capsule, Take 1 capsule (10 mg) by mouth once daily., Disp: , Rfl:     thiamine (Vitamin B-1) 500 mg tablet, Take by mouth., Disp: , Rfl:     verapamil ER (Veralan PM) 240 mg 24 hr capsule, Take 1 capsule (240 mg) by mouth once daily at bedtime. Do not crush or chew., Disp: , Rfl:       Objective   Neurological Exam    On physical exam, the patient is alert and interactive.  Extraocular movements are intact.  Face moves symmetrically.  He has well-healed incisions on his scalp.  Immediately above the right occipital shunt insertion region there is scarring from a surgery for skin cancer.    The patient recently underwent a CAT scan of the brain.  He did not have the  images available for review but comparison to a scan from 2006 says that his ventricles are enlarged but stable.  A CAT scan from 2021 that I personally reviewed it demonstrates enlarged ventricles diffusely with a right occipital catheter system.    While the patient has enlarged ventricles he does not have any clinical evidence of intracranial hypertension.  As such, no immediate intervention is warranted.  It is safe for him to undergo his planned hip surgery.  There is the possibility that the patient has normal pressure hydrocephalus which could be affecting his gait and memory.  I would like to see his most recent CAT scan and shunt series to look at the configuration of his shunt system and the type of valve.  He may require a tracer study to demonstrate shunt patency.

## 2024-08-02 ENCOUNTER — APPOINTMENT (OUTPATIENT)
Dept: ORTHOPEDIC SURGERY | Facility: CLINIC | Age: 64
End: 2024-08-02
Payer: COMMERCIAL

## 2024-08-02 DIAGNOSIS — M16.11 PRIMARY OSTEOARTHRITIS OF RIGHT HIP: ICD-10-CM

## 2024-08-05 ENCOUNTER — OFFICE VISIT (OUTPATIENT)
Dept: ORTHOPEDIC SURGERY | Facility: HOSPITAL | Age: 64
End: 2024-08-05
Payer: COMMERCIAL

## 2024-08-05 ENCOUNTER — HOSPITAL ENCOUNTER (OUTPATIENT)
Dept: RADIOLOGY | Facility: HOSPITAL | Age: 64
Discharge: HOME | End: 2024-08-05
Payer: COMMERCIAL

## 2024-08-05 ENCOUNTER — APPOINTMENT (OUTPATIENT)
Dept: NEUROSURGERY | Facility: CLINIC | Age: 64
End: 2024-08-05
Payer: COMMERCIAL

## 2024-08-05 DIAGNOSIS — M16.11 PRIMARY OSTEOARTHRITIS OF RIGHT HIP: ICD-10-CM

## 2024-08-05 DIAGNOSIS — M16.11 PRIMARY OSTEOARTHRITIS OF RIGHT HIP: Primary | ICD-10-CM

## 2024-08-05 PROCEDURE — 99214 OFFICE O/P EST MOD 30 MIN: CPT | Mod: 57 | Performed by: STUDENT IN AN ORGANIZED HEALTH CARE EDUCATION/TRAINING PROGRAM

## 2024-08-05 PROCEDURE — 99214 OFFICE O/P EST MOD 30 MIN: CPT | Performed by: STUDENT IN AN ORGANIZED HEALTH CARE EDUCATION/TRAINING PROGRAM

## 2024-08-05 PROCEDURE — 73502 X-RAY EXAM HIP UNI 2-3 VIEWS: CPT | Mod: RT

## 2024-08-05 PROCEDURE — 77073 BONE LENGTH STUDIES: CPT

## 2024-08-05 PROCEDURE — 72170 X-RAY EXAM OF PELVIS: CPT
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
## 2024-08-06 ENCOUNTER — TELEPHONE (OUTPATIENT)
Dept: ORTHOPEDIC SURGERY | Facility: HOSPITAL | Age: 64
End: 2024-08-06
Payer: COMMERCIAL

## 2024-08-06 NOTE — TELEPHONE ENCOUNTER
Called patient to discuss upcoming surgery. I spoke to his care taker.  Confirmed that the plan is for an overnight hospital stay. The patient has obtained their medical equipment. The patient does have a care partner to assist them at home postoperatively. Reminded patient to follow PAT instructions leading up to surgery and to watch for a phone call from preop the day prior to their surgery to receive details about arrival time. All questions answered at this time.

## 2024-08-07 ENCOUNTER — APPOINTMENT (OUTPATIENT)
Dept: ORTHOPEDIC SURGERY | Facility: CLINIC | Age: 64
End: 2024-08-07
Payer: COMMERCIAL

## 2024-08-07 DIAGNOSIS — M47.12 CERVICAL SPONDYLOSIS WITH MYELOPATHY: Primary | ICD-10-CM

## 2024-08-07 PROCEDURE — 99213 OFFICE O/P EST LOW 20 MIN: CPT | Performed by: ORTHOPAEDIC SURGERY

## 2024-08-07 PROCEDURE — 1036F TOBACCO NON-USER: CPT | Performed by: ORTHOPAEDIC SURGERY

## 2024-08-07 ASSESSMENT — PAIN - FUNCTIONAL ASSESSMENT: PAIN_FUNCTIONAL_ASSESSMENT: 0-10

## 2024-08-07 ASSESSMENT — PAIN SCALES - GENERAL: PAINLEVEL_OUTOF10: 3

## 2024-08-07 NOTE — PROGRESS NOTES
Otto returns.  He did have a CT myelogram.  There is mild cervical and thoracic stenosis but no high-grade spinal cord compression.    He is scheduled to proceed with total hip replacements with Dr. Menchaca and I think he is in excellent hands.    I would plan to continue to follow him if there is any ongoing gait disturbance.    He is also having a shunt assessment following his hip replacement.

## 2024-08-07 NOTE — LETTER
August 7, 2024     Felicia Garcia DO  [address] [address] [address]    Patient: Otto Yen   YOB: 1960   Date of Visit: 8/7/2024       Dear Dr. Felicia Garcia DO:    Thank you for referring Otto Yen to me for evaluation. Below are my notes for this consultation.  If you have questions, please do not hesitate to call me. I look forward to following your patient along with you.       Sincerely,     Oscar Fortune MD      CC: No Recipients  ______________________________________________________________________________________    Toto returns.  He did have a CT myelogram.  There is mild cervical and thoracic stenosis but no high-grade spinal cord compression.    He is scheduled to proceed with total hip replacements with Dr. Menchaca and I think he is in excellent hands.    I would plan to continue to follow him if there is any ongoing gait disturbance.    He is also having a shunt assessment following his hip replacement.

## 2024-08-08 DIAGNOSIS — N18.9 CHRONIC KIDNEY DISEASE, UNSPECIFIED: Primary | ICD-10-CM

## 2024-08-12 ENCOUNTER — LAB (OUTPATIENT)
Dept: LAB | Facility: LAB | Age: 64
End: 2024-08-12
Payer: COMMERCIAL

## 2024-08-12 DIAGNOSIS — N18.9 CHRONIC KIDNEY DISEASE, UNSPECIFIED: ICD-10-CM

## 2024-08-12 PROCEDURE — 83970 ASSAY OF PARATHORMONE: CPT

## 2024-08-12 PROCEDURE — 83735 ASSAY OF MAGNESIUM: CPT

## 2024-08-12 PROCEDURE — 85025 COMPLETE CBC W/AUTO DIFF WBC: CPT

## 2024-08-12 PROCEDURE — 82330 ASSAY OF CALCIUM: CPT

## 2024-08-12 PROCEDURE — 82043 UR ALBUMIN QUANTITATIVE: CPT

## 2024-08-12 PROCEDURE — 80053 COMPREHEN METABOLIC PANEL: CPT

## 2024-08-12 PROCEDURE — 82570 ASSAY OF URINE CREATININE: CPT

## 2024-08-12 PROCEDURE — 36415 COLL VENOUS BLD VENIPUNCTURE: CPT

## 2024-08-13 LAB
ALBUMIN SERPL BCP-MCNC: 4.4 G/DL (ref 3.4–5)
ALP SERPL-CCNC: 52 U/L (ref 33–136)
ALT SERPL W P-5'-P-CCNC: 31 U/L (ref 10–52)
ANION GAP SERPL CALC-SCNC: 13 MMOL/L (ref 10–20)
AST SERPL W P-5'-P-CCNC: 21 U/L (ref 9–39)
BASOPHILS # BLD AUTO: 0.09 X10*3/UL (ref 0–0.1)
BASOPHILS NFR BLD AUTO: 1.1 %
BILIRUB SERPL-MCNC: 0.5 MG/DL (ref 0–1.2)
BUN SERPL-MCNC: 38 MG/DL (ref 6–23)
CA-I BLD-SCNC: 1.22 MMOL/L (ref 1.1–1.33)
CALCIUM SERPL-MCNC: 9.6 MG/DL (ref 8.6–10.6)
CHLORIDE SERPL-SCNC: 104 MMOL/L (ref 98–107)
CO2 SERPL-SCNC: 27 MMOL/L (ref 21–32)
CREAT SERPL-MCNC: 1.47 MG/DL (ref 0.5–1.3)
CREAT UR-MCNC: 58.4 MG/DL (ref 20–370)
EGFRCR SERPLBLD CKD-EPI 2021: 53 ML/MIN/1.73M*2
EOSINOPHIL # BLD AUTO: 1.41 X10*3/UL (ref 0–0.7)
EOSINOPHIL NFR BLD AUTO: 16.7 %
ERYTHROCYTE [DISTWIDTH] IN BLOOD BY AUTOMATED COUNT: 13.4 % (ref 11.5–14.5)
GLUCOSE SERPL-MCNC: 94 MG/DL (ref 74–99)
HCT VFR BLD AUTO: 34.7 % (ref 41–52)
HGB BLD-MCNC: 11.3 G/DL (ref 13.5–17.5)
IMM GRANULOCYTES # BLD AUTO: 0.02 X10*3/UL (ref 0–0.7)
IMM GRANULOCYTES NFR BLD AUTO: 0.2 % (ref 0–0.9)
LYMPHOCYTES # BLD AUTO: 1.09 X10*3/UL (ref 1.2–4.8)
LYMPHOCYTES NFR BLD AUTO: 12.9 %
MAGNESIUM SERPL-MCNC: 1.89 MG/DL (ref 1.6–2.4)
MCH RBC QN AUTO: 29.4 PG (ref 26–34)
MCHC RBC AUTO-ENTMCNC: 32.6 G/DL (ref 32–36)
MCV RBC AUTO: 90 FL (ref 80–100)
MICROALBUMIN UR-MCNC: 119.8 MG/L
MICROALBUMIN/CREAT UR: 205.1 UG/MG CREAT
MONOCYTES # BLD AUTO: 0.48 X10*3/UL (ref 0.1–1)
MONOCYTES NFR BLD AUTO: 5.7 %
NEUTROPHILS # BLD AUTO: 5.33 X10*3/UL (ref 1.2–7.7)
NEUTROPHILS NFR BLD AUTO: 63.4 %
NRBC BLD-RTO: 0 /100 WBCS (ref 0–0)
PLATELET # BLD AUTO: 248 X10*3/UL (ref 150–450)
POTASSIUM SERPL-SCNC: 4.4 MMOL/L (ref 3.5–5.3)
PROT SERPL-MCNC: 7.3 G/DL (ref 6.4–8.2)
PTH-INTACT SERPL-MCNC: 55.7 PG/ML (ref 18.5–88)
RBC # BLD AUTO: 3.85 X10*6/UL (ref 4.5–5.9)
SODIUM SERPL-SCNC: 140 MMOL/L (ref 136–145)
WBC # BLD AUTO: 8.4 X10*3/UL (ref 4.4–11.3)

## 2024-08-14 RX ORDER — BUPIVACAINE HCL/EPINEPHRINE 0.5-1:200K
30 VIAL (ML) INJECTION ONCE
Status: CANCELLED | OUTPATIENT
Start: 2024-08-14 | End: 2024-08-14

## 2024-08-14 RX ORDER — SENNOSIDES 8.6 MG/1
1 TABLET ORAL DAILY
Qty: 15 TABLET | Refills: 0 | Status: SHIPPED | OUTPATIENT
Start: 2024-08-14 | End: 2024-08-21 | Stop reason: HOSPADM

## 2024-08-14 RX ORDER — CEFAZOLIN 1 G/1
500 INJECTION, POWDER, FOR SOLUTION INTRAVENOUS ONCE
Status: CANCELLED | OUTPATIENT
Start: 2024-08-14 | End: 2024-08-14

## 2024-08-14 RX ORDER — SODIUM CHLORIDE 9 MG/ML
22 INJECTION INTRAMUSCULAR; INTRAVENOUS; SUBCUTANEOUS ONCE
Status: CANCELLED | OUTPATIENT
Start: 2024-08-14 | End: 2024-08-14

## 2024-08-14 RX ORDER — ONDANSETRON 4 MG/1
4 TABLET, FILM COATED ORAL EVERY 8 HOURS PRN
Qty: 20 TABLET | Refills: 0 | Status: SHIPPED | OUTPATIENT
Start: 2024-08-14 | End: 2024-08-21 | Stop reason: HOSPADM

## 2024-08-14 RX ORDER — DOCUSATE SODIUM 100 MG/1
100 CAPSULE, LIQUID FILLED ORAL 2 TIMES DAILY
Qty: 30 CAPSULE | Refills: 0 | Status: SHIPPED | OUTPATIENT
Start: 2024-08-14 | End: 2024-08-21 | Stop reason: HOSPADM

## 2024-08-14 RX ORDER — OXYCODONE HYDROCHLORIDE 5 MG/1
5-10 TABLET ORAL EVERY 6 HOURS PRN
Qty: 40 TABLET | Refills: 0 | Status: SHIPPED | OUTPATIENT
Start: 2024-08-14 | End: 2024-08-21 | Stop reason: HOSPADM

## 2024-08-14 RX ORDER — ACETAMINOPHEN 500 MG
1000 TABLET ORAL EVERY 8 HOURS
Qty: 360 TABLET | Refills: 0 | Status: SHIPPED | OUTPATIENT
Start: 2024-08-14 | End: 2024-08-21 | Stop reason: HOSPADM

## 2024-08-14 RX ORDER — NAPROXEN SODIUM 220 MG/1
81 TABLET, FILM COATED ORAL 2 TIMES DAILY
Qty: 84 TABLET | Refills: 0 | Status: SHIPPED | OUTPATIENT
Start: 2024-08-14 | End: 2024-08-21 | Stop reason: HOSPADM

## 2024-08-14 RX ORDER — PANTOPRAZOLE SODIUM 40 MG/1
40 TABLET, DELAYED RELEASE ORAL
Qty: 30 TABLET | Refills: 0 | Status: SHIPPED | OUTPATIENT
Start: 2024-08-14 | End: 2024-08-21 | Stop reason: HOSPADM

## 2024-08-14 RX ORDER — CEFADROXIL 500 MG/1
500 CAPSULE ORAL 2 TIMES DAILY
Qty: 14 CAPSULE | Refills: 0 | Status: SHIPPED | OUTPATIENT
Start: 2024-08-14 | End: 2024-08-28

## 2024-08-14 RX ORDER — TRAMADOL HYDROCHLORIDE 50 MG/1
50-100 TABLET ORAL EVERY 6 HOURS PRN
Qty: 40 TABLET | Refills: 0 | Status: SHIPPED | OUTPATIENT
Start: 2024-08-14 | End: 2024-08-21 | Stop reason: HOSPADM

## 2024-08-14 NOTE — DISCHARGE INSTRUCTIONS
Total Knee Arthroplasty   Postoperative Instructions    CONTACT INFORMATION  Kiara Menchaca MD  Joint Replacement Surgeon   Dorothe[facility] Escobar      [phone]     Rose Arredondo MBA, BSN, RN-BC  Ortho Coordinator [place]  [phone]    Joan Christianson RN, BSN  Ortho Coordinator [facility]  [phone]    Ria Lion BSN-BC  Ortho Nurse Navigator  [phone]    DRIVING AFTER SURGERY   Please discuss post-surgical, long-distance travel with your surgeon. You may not drive until you are off narcotics and cleared by your surgical team.     WOUND CARE   A padded wrap (ace wrap) was placed on your knee on the day of surgery. You may remove this on the 2nd day after surgery.     A waterproof dressing was placed over your surgical site. You may shower over this dressing after surgery and pat it dry. Please do not scrub, soak, or submerge your surgical site for at least three weeks after surgery to allow your incision to heal. Avoid using creams and ointments around your surgical site while it is healing.    Your dressing will be removed on post-operative day 7 by your physical therapist. You may leave the incision open to air after the bandage has been removed. Please do not submerge your incision in a hot tub/pool/lake/etc. until cleared by your surgeon. Please contact the office if there are any concerns with your wound.     Pain, swelling, and bruising are normal after total knee replacement surgery. You may alleviate these symptoms by following the post-operative pain regimen that was prescribed, icing your surgical site multiple times a day, and elevating your extremity throughout the day.     ACTIVITY AND PRECAUTIONS  Please follow the post-operative activity precautions that were described to you by your surgical team and physical therapists (if applicable).    Weightbearing restriction: weightbearing as tolerated.     DISCHARGE MEDICATIONS  Pain  Please take the pain medications  that were prescribed to you according to the prescribed schedule. You may not operate a motor vehicle while taking narcotic medications (e.g. Oxycodone, Tramadol).     Please take Tylenol and NSAIDs (if you are able) on a schedule as discussed in clinic. Please take the prescribed Pantoprazole to protect your stomach from ulceration after surgery while taking NSAIDs.     Please wean off the narcotic pain medications as soon as you are able.     Blood-Thinners  Please take the blood thinning medications that were prescribed according to the instructions from your surgeon. These are typically continued for 6 weeks postoperatively. This schedule may differ if you were already on blood-thinning medication prior to your surgery.     Nausea  You may feel nauseous after surgery due to the anesthetics or pain medications you are taking. You may take anti-nausea medication (e.g. Ondansetron) to help with these symptoms.     Stool Softeners   Please take the stool softeners that were prescribed at discharge (e.g. Colace, Senna) while you are on narcotic pain medications to minimize your risk of constipation.    Home Medications   You may restart your home medications at time of discharge according to your discharge instructions.    PHYSICAL THERAPY AND OCCUPATIONAL THERAPY   In-home physical therapy and occupational therapy will start within a few days of your discharge to home. You will transition to outpatient physical therapy around 2-3 weeks after your surgery.     FOLLOW-UP  You will see your surgical team around 2 weeks after surgery for a wound check (unless otherwise arranged) and between 4-6 weeks after surgery for X-rays and clinical evaluation.    CALL YOUR SURGEON IF YOU HAVE:   ° Drainage that is not contained by your surgical dressing.  ° Redness, pain or swelling in your calf.   ° Severe pain that is not controlled by the pain regimen prescribed.   ° Fever >101 Fahrenheit presents for at least 48 hours or other  signs of infection (wound drainage, redness around your surgical incision, increased joint pain)  ° Go to the emergency department or call 911 if you experience chest pain, shortness of breath or other emergent symptoms. Do not call your surgeon first.     ANTIBIOTIC PROPHYLAXIS AFTER JOINT REPLACEMENT SURGERY   Although it is a rare occurrence, an artificial joint can become infected by the bloodstream carrying infection from another part of the body to the replaced joint.  Therefore, it is important that any bacterial infection be treated promptly. If you are unsure of whether you need to take antibiotics, please call the office before taking any medication.  We advise that you take antibiotics prior to the following invasive procedures for a lifetime. Please wait at least 3-6 months after joint replacement surgery before undergoing dental work or cleaning.    Please take antibiotics one hour before any of the following procedures:  ° Routine dental cleaning or dental procedure  ° Root canal  ° Podiatry procedures that involve cutting into the skin  ° Dermatologic procedures that involve cutting into the skin.  (Not required for laser or freezing of skin)  ° Invasive procedures regarding the urinary tract     Antibiotics are not required for the following procedures:   ° Manicures/Pedicures  ° Colonoscopy/Endoscopy/Sigmoidoscopy  ° Routine gynecologic exams/procedures/PAP smears, D&C´s  ° Cataract/laser eye surgery  ° Injection or blood work  ° Routine colds and flu and minor cuts and bruises    You may have a flu shot at any time following your surgery.

## 2024-08-15 ENCOUNTER — HOME HEALTH ADMISSION (OUTPATIENT)
Dept: HOME HEALTH SERVICES | Facility: HOME HEALTH | Age: 64
End: 2024-08-15
Payer: COMMERCIAL

## 2024-08-15 ENCOUNTER — ANESTHESIA EVENT (OUTPATIENT)
Dept: OPERATING ROOM | Facility: HOSPITAL | Age: 64
End: 2024-08-15
Payer: COMMERCIAL

## 2024-08-15 ENCOUNTER — ANESTHESIA (OUTPATIENT)
Dept: OPERATING ROOM | Facility: HOSPITAL | Age: 64
End: 2024-08-15
Payer: COMMERCIAL

## 2024-08-15 ENCOUNTER — HOSPITAL ENCOUNTER (OUTPATIENT)
Facility: HOSPITAL | Age: 64
Setting detail: OUTPATIENT SURGERY
Discharge: HOME | End: 2024-08-15
Attending: STUDENT IN AN ORGANIZED HEALTH CARE EDUCATION/TRAINING PROGRAM | Admitting: STUDENT IN AN ORGANIZED HEALTH CARE EDUCATION/TRAINING PROGRAM
Payer: COMMERCIAL

## 2024-08-15 VITALS
DIASTOLIC BLOOD PRESSURE: 79 MMHG | HEART RATE: 64 BPM | SYSTOLIC BLOOD PRESSURE: 144 MMHG | BODY MASS INDEX: 30.07 KG/M2 | HEIGHT: 72 IN | OXYGEN SATURATION: 97 % | WEIGHT: 222 LBS | RESPIRATION RATE: 16 BRPM | TEMPERATURE: 98.6 F

## 2024-08-15 DIAGNOSIS — M16.11 UNILATERAL PRIMARY OSTEOARTHRITIS, RIGHT HIP: Primary | ICD-10-CM

## 2024-08-15 PROBLEM — G91.9 HYDROCEPHALUS (MULTI): Status: ACTIVE | Noted: 2024-08-15

## 2024-08-15 PROBLEM — I10 HTN (HYPERTENSION): Status: ACTIVE | Noted: 2024-08-15

## 2024-08-15 PROBLEM — N18.9 CHRONIC RENAL INSUFFICIENCY: Status: ACTIVE | Noted: 2024-08-15

## 2024-08-15 PROBLEM — G47.33 OSA (OBSTRUCTIVE SLEEP APNEA): Status: ACTIVE | Noted: 2024-08-15

## 2024-08-15 PROBLEM — D64.9 ANEMIA: Status: ACTIVE | Noted: 2024-08-15

## 2024-08-15 PROBLEM — I61.9 CEREBRAL BRAIN HEMORRHAGE (MULTI): Status: ACTIVE | Noted: 2024-08-15

## 2024-08-15 PROBLEM — E78.5 HYPERLIPIDEMIA: Status: ACTIVE | Noted: 2024-08-15

## 2024-08-15 LAB
ABO GROUP (TYPE) IN BLOOD: NORMAL
ANTIBODY SCREEN: NORMAL
RH FACTOR (ANTIGEN D): NORMAL

## 2024-08-15 PROCEDURE — 2500000004 HC RX 250 GENERAL PHARMACY W/ HCPCS (ALT 636 FOR OP/ED): Performed by: STUDENT IN AN ORGANIZED HEALTH CARE EDUCATION/TRAINING PROGRAM

## 2024-08-15 PROCEDURE — 86901 BLOOD TYPING SEROLOGIC RH(D): CPT | Performed by: ANESTHESIOLOGY

## 2024-08-15 PROCEDURE — 2500000005 HC RX 250 GENERAL PHARMACY W/O HCPCS: Performed by: STUDENT IN AN ORGANIZED HEALTH CARE EDUCATION/TRAINING PROGRAM

## 2024-08-15 PROCEDURE — 36415 COLL VENOUS BLD VENIPUNCTURE: CPT | Performed by: ANESTHESIOLOGY

## 2024-08-15 PROCEDURE — 2500000004 HC RX 250 GENERAL PHARMACY W/ HCPCS (ALT 636 FOR OP/ED): Performed by: ANESTHESIOLOGY

## 2024-08-15 RX ORDER — SODIUM CHLORIDE, SODIUM LACTATE, POTASSIUM CHLORIDE, CALCIUM CHLORIDE 600; 310; 30; 20 MG/100ML; MG/100ML; MG/100ML; MG/100ML
100 INJECTION, SOLUTION INTRAVENOUS CONTINUOUS
Status: DISCONTINUED | OUTPATIENT
Start: 2024-08-15 | End: 2024-08-19 | Stop reason: HOSPADM

## 2024-08-15 RX ORDER — SODIUM CHLORIDE, SODIUM LACTATE, POTASSIUM CHLORIDE, CALCIUM CHLORIDE 600; 310; 30; 20 MG/100ML; MG/100ML; MG/100ML; MG/100ML
100 INJECTION, SOLUTION INTRAVENOUS CONTINUOUS
Status: CANCELLED | OUTPATIENT
Start: 2024-08-15

## 2024-08-15 RX ORDER — DROPERIDOL 2.5 MG/ML
0.62 INJECTION, SOLUTION INTRAMUSCULAR; INTRAVENOUS ONCE AS NEEDED
Status: CANCELLED | OUTPATIENT
Start: 2024-08-15

## 2024-08-15 RX ORDER — ONDANSETRON HYDROCHLORIDE 2 MG/ML
4 INJECTION, SOLUTION INTRAVENOUS ONCE AS NEEDED
Status: CANCELLED | OUTPATIENT
Start: 2024-08-15

## 2024-08-15 RX ORDER — ALBUTEROL SULFATE 0.83 MG/ML
2.5 SOLUTION RESPIRATORY (INHALATION) ONCE AS NEEDED
Status: CANCELLED | OUTPATIENT
Start: 2024-08-15

## 2024-08-15 RX ORDER — LIDOCAINE HYDROCHLORIDE 10 MG/ML
0.1 INJECTION INFILTRATION; PERINEURAL ONCE
Status: CANCELLED | OUTPATIENT
Start: 2024-08-15 | End: 2024-08-15

## 2024-08-15 RX ORDER — FENTANYL CITRATE 50 UG/ML
INJECTION, SOLUTION INTRAMUSCULAR; INTRAVENOUS CONTINUOUS PRN
Status: DISCONTINUED | OUTPATIENT
Start: 2024-08-15 | End: 2024-08-16 | Stop reason: HOSPADM

## 2024-08-15 RX ORDER — PROPOFOL 10 MG/ML
INJECTION, EMULSION INTRAVENOUS CONTINUOUS PRN
Status: DISCONTINUED | OUTPATIENT
Start: 2024-08-15 | End: 2024-08-16 | Stop reason: HOSPADM

## 2024-08-15 RX ORDER — SODIUM CHLORIDE, SODIUM LACTATE, POTASSIUM CHLORIDE, CALCIUM CHLORIDE 600; 310; 30; 20 MG/100ML; MG/100ML; MG/100ML; MG/100ML
20 INJECTION, SOLUTION INTRAVENOUS CONTINUOUS
Status: DISCONTINUED | OUTPATIENT
Start: 2024-08-15 | End: 2024-08-15 | Stop reason: SDUPTHER

## 2024-08-15 RX ORDER — CEFAZOLIN SODIUM 2 G/100ML
2 INJECTION, SOLUTION INTRAVENOUS ONCE
Status: DISCONTINUED | OUTPATIENT
Start: 2024-08-15 | End: 2024-08-19 | Stop reason: HOSPADM

## 2024-08-15 RX ORDER — ACETAMINOPHEN 325 MG/1
650 TABLET ORAL EVERY 4 HOURS PRN
Status: CANCELLED | OUTPATIENT
Start: 2024-08-15

## 2024-08-15 RX ORDER — IPRATROPIUM BROMIDE 0.5 MG/2.5ML
500 SOLUTION RESPIRATORY (INHALATION) ONCE
Status: CANCELLED | OUTPATIENT
Start: 2024-08-15 | End: 2024-08-15

## 2024-08-15 RX ORDER — OXYCODONE HYDROCHLORIDE 5 MG/1
5 TABLET ORAL EVERY 4 HOURS PRN
Status: CANCELLED | OUTPATIENT
Start: 2024-08-15

## 2024-08-15 RX ORDER — MIDAZOLAM HYDROCHLORIDE 1 MG/ML
INJECTION INTRAMUSCULAR; INTRAVENOUS CONTINUOUS PRN
Status: DISCONTINUED | OUTPATIENT
Start: 2024-08-15 | End: 2024-08-16 | Stop reason: HOSPADM

## 2024-08-15 ASSESSMENT — COLUMBIA-SUICIDE SEVERITY RATING SCALE - C-SSRS
1. IN THE PAST MONTH, HAVE YOU WISHED YOU WERE DEAD OR WISHED YOU COULD GO TO SLEEP AND NOT WAKE UP?: NO
2. HAVE YOU ACTUALLY HAD ANY THOUGHTS OF KILLING YOURSELF?: NO
6. HAVE YOU EVER DONE ANYTHING, STARTED TO DO ANYTHING, OR PREPARED TO DO ANYTHING TO END YOUR LIFE?: NO

## 2024-08-15 ASSESSMENT — PAIN - FUNCTIONAL ASSESSMENT: PAIN_FUNCTIONAL_ASSESSMENT: 0-10

## 2024-08-15 ASSESSMENT — PAIN SCALES - GENERAL: PAINLEVEL_OUTOF10: 0 - NO PAIN

## 2024-08-15 NOTE — ANESTHESIA PREPROCEDURE EVALUATION
Patient: Otto Yen    Procedure Information       Date/Time: 08/15/24 0380    Procedure: Right Hip Total Arthroplasty ~ Anterior Approach (Right: Hip) - SIFI Block    Location: [facility] A OR 11 / Virtual [facility] A OR    Surgeons: Kiara Menchaca MD            Relevant Problems   Cardiac   (+) HTN (hypertension)   (+) Hyperlipidemia      Pulmonary   (+) MARCELA (obstructive sleep apnea)      Neuro   (+) Cerebral brain hemorrhage (Multi)      /Renal   (+) Chronic renal insufficiency      Hematology   (+) Anemia      Musculoskeletal   (+) Unilateral primary osteoarthritis, right hip      Nervous   (+) Hydrocephalus (Multi)       Clinical information reviewed:                   NPO Detail:  No data recorded     Physical Exam    Airway  Mallampati: II     Cardiovascular   Rhythm: regular  Rate: normal     Dental    Pulmonary    Abdominal          Anesthesia Plan    History of general anesthesia?: yes  History of complications of general anesthesia?: no    ASA 3     general     intravenous induction   Anesthetic plan and risks discussed with patient.    Plan discussed with CRNA.

## 2024-08-15 NOTE — INTERVAL H&P NOTE
H&P reviewed. The patient was examined and there are no changes to the H&P.    Ana Pinon DO PGY1  Orthopaedic Surgery

## 2024-08-15 NOTE — PROGRESS NOTES
Due to multiple tray processing delays, case has been canceled. Patient and family updated. Rescheduled for next Tuesday.

## 2024-08-20 ENCOUNTER — HOSPITAL ENCOUNTER (OUTPATIENT)
Facility: HOSPITAL | Age: 64
LOS: 1 days | Discharge: HOME HEALTH | End: 2024-08-21
Attending: STUDENT IN AN ORGANIZED HEALTH CARE EDUCATION/TRAINING PROGRAM | Admitting: STUDENT IN AN ORGANIZED HEALTH CARE EDUCATION/TRAINING PROGRAM
Payer: COMMERCIAL

## 2024-08-20 ENCOUNTER — APPOINTMENT (OUTPATIENT)
Dept: RADIOLOGY | Facility: HOSPITAL | Age: 64
End: 2024-08-20
Payer: COMMERCIAL

## 2024-08-20 ENCOUNTER — ANESTHESIA (OUTPATIENT)
Dept: OPERATING ROOM | Facility: HOSPITAL | Age: 64
End: 2024-08-20
Payer: COMMERCIAL

## 2024-08-20 ENCOUNTER — ANESTHESIA EVENT (OUTPATIENT)
Dept: OPERATING ROOM | Facility: HOSPITAL | Age: 64
End: 2024-08-20
Payer: COMMERCIAL

## 2024-08-20 DIAGNOSIS — M16.11 UNILATERAL PRIMARY OSTEOARTHRITIS, RIGHT HIP: Primary | ICD-10-CM

## 2024-08-20 LAB
ABO GROUP (TYPE) IN BLOOD: NORMAL
RH FACTOR (ANTIGEN D): NORMAL

## 2024-08-20 PROCEDURE — 76000 FLUOROSCOPY <1 HR PHYS/QHP: CPT | Mod: RT

## 2024-08-20 PROCEDURE — C1713 ANCHOR/SCREW BN/BN,TIS/BN: HCPCS | Performed by: STUDENT IN AN ORGANIZED HEALTH CARE EDUCATION/TRAINING PROGRAM

## 2024-08-20 PROCEDURE — 27130 TOTAL HIP ARTHROPLASTY: CPT | Performed by: STUDENT IN AN ORGANIZED HEALTH CARE EDUCATION/TRAINING PROGRAM

## 2024-08-20 PROCEDURE — C1776 JOINT DEVICE (IMPLANTABLE): HCPCS | Performed by: STUDENT IN AN ORGANIZED HEALTH CARE EDUCATION/TRAINING PROGRAM

## 2024-08-20 PROCEDURE — 2500000004 HC RX 250 GENERAL PHARMACY W/ HCPCS (ALT 636 FOR OP/ED): Performed by: STUDENT IN AN ORGANIZED HEALTH CARE EDUCATION/TRAINING PROGRAM

## 2024-08-20 PROCEDURE — 3600000017 HC OR TIME - EACH INCREMENTAL 1 MINUTE - PROCEDURE LEVEL SIX: Performed by: STUDENT IN AN ORGANIZED HEALTH CARE EDUCATION/TRAINING PROGRAM

## 2024-08-20 PROCEDURE — 2700000047 HC OR 270 NO HCPCS: Performed by: STUDENT IN AN ORGANIZED HEALTH CARE EDUCATION/TRAINING PROGRAM

## 2024-08-20 PROCEDURE — 72170 X-RAY EXAM OF PELVIS: CPT | Performed by: RADIOLOGY

## 2024-08-20 PROCEDURE — 3600000018 HC OR TIME - INITIAL BASE CHARGE - PROCEDURE LEVEL SIX: Performed by: STUDENT IN AN ORGANIZED HEALTH CARE EDUCATION/TRAINING PROGRAM

## 2024-08-20 PROCEDURE — 72170 X-RAY EXAM OF PELVIS: CPT

## 2024-08-20 PROCEDURE — 2500000005 HC RX 250 GENERAL PHARMACY W/O HCPCS: Performed by: NURSE ANESTHETIST, CERTIFIED REGISTERED

## 2024-08-20 PROCEDURE — 7100000002 HC RECOVERY ROOM TIME - EACH INCREMENTAL 1 MINUTE: Performed by: STUDENT IN AN ORGANIZED HEALTH CARE EDUCATION/TRAINING PROGRAM

## 2024-08-20 PROCEDURE — 2500000004 HC RX 250 GENERAL PHARMACY W/ HCPCS (ALT 636 FOR OP/ED): Performed by: ANESTHESIOLOGY

## 2024-08-20 PROCEDURE — 7100000001 HC RECOVERY ROOM TIME - INITIAL BASE CHARGE: Performed by: STUDENT IN AN ORGANIZED HEALTH CARE EDUCATION/TRAINING PROGRAM

## 2024-08-20 PROCEDURE — 3700000001 HC GENERAL ANESTHESIA TIME - INITIAL BASE CHARGE: Performed by: STUDENT IN AN ORGANIZED HEALTH CARE EDUCATION/TRAINING PROGRAM

## 2024-08-20 PROCEDURE — 2500000004 HC RX 250 GENERAL PHARMACY W/ HCPCS (ALT 636 FOR OP/ED): Mod: JZ

## 2024-08-20 PROCEDURE — 2780000003 HC OR 278 NO HCPCS: Performed by: STUDENT IN AN ORGANIZED HEALTH CARE EDUCATION/TRAINING PROGRAM

## 2024-08-20 PROCEDURE — 36415 COLL VENOUS BLD VENIPUNCTURE: CPT | Performed by: STUDENT IN AN ORGANIZED HEALTH CARE EDUCATION/TRAINING PROGRAM

## 2024-08-20 PROCEDURE — 2500000005 HC RX 250 GENERAL PHARMACY W/O HCPCS: Performed by: STUDENT IN AN ORGANIZED HEALTH CARE EDUCATION/TRAINING PROGRAM

## 2024-08-20 PROCEDURE — 2500000005 HC RX 250 GENERAL PHARMACY W/O HCPCS: Performed by: ANESTHESIOLOGIST ASSISTANT

## 2024-08-20 PROCEDURE — 1100000001 HC PRIVATE ROOM DAILY

## 2024-08-20 PROCEDURE — 2500000004 HC RX 250 GENERAL PHARMACY W/ HCPCS (ALT 636 FOR OP/ED): Performed by: ANESTHESIOLOGIST ASSISTANT

## 2024-08-20 PROCEDURE — 2500000001 HC RX 250 WO HCPCS SELF ADMINISTERED DRUGS (ALT 637 FOR MEDICARE OP): Performed by: ANESTHESIOLOGY

## 2024-08-20 PROCEDURE — RXMED WILLOW AMBULATORY MEDICATION CHARGE

## 2024-08-20 PROCEDURE — 3700000002 HC GENERAL ANESTHESIA TIME - EACH INCREMENTAL 1 MINUTE: Performed by: STUDENT IN AN ORGANIZED HEALTH CARE EDUCATION/TRAINING PROGRAM

## 2024-08-20 PROCEDURE — 2500000004 HC RX 250 GENERAL PHARMACY W/ HCPCS (ALT 636 FOR OP/ED): Performed by: NURSE ANESTHETIST, CERTIFIED REGISTERED

## 2024-08-20 PROCEDURE — 2500000001 HC RX 250 WO HCPCS SELF ADMINISTERED DRUGS (ALT 637 FOR MEDICARE OP)

## 2024-08-20 PROCEDURE — 2500000005 HC RX 250 GENERAL PHARMACY W/O HCPCS: Performed by: ANESTHESIOLOGY

## 2024-08-20 PROCEDURE — 2720000007 HC OR 272 NO HCPCS: Performed by: STUDENT IN AN ORGANIZED HEALTH CARE EDUCATION/TRAINING PROGRAM

## 2024-08-20 DEVICE — PINNACLE CANCELLOUS BONE SCREW 6.5MM X 20MM
Type: IMPLANTABLE DEVICE | Site: HIP | Status: FUNCTIONAL
Brand: PINNACLE

## 2024-08-20 DEVICE — BIOLOX DELTA TS CERAMIC FEMORAL HEAD 12/14 TAPER REVISION DIAMETER 40MM +5
Type: IMPLANTABLE DEVICE | Site: HIP | Status: FUNCTIONAL
Brand: BIOLOX DELTA

## 2024-08-20 DEVICE — PINNACLE CANCELLOUS BONE SCREW 6.5MM X 25MM
Type: IMPLANTABLE DEVICE | Site: HIP | Status: FUNCTIONAL
Brand: PINNACLE

## 2024-08-20 DEVICE — ACTIS DUOFIX HIP PROSTHESIS (FEMORAL STEM 12/14 TAPER CEMENTLESS SIZE 5 HIGH COLLAR)  CE
Type: IMPLANTABLE DEVICE | Site: HIP | Status: FUNCTIONAL
Brand: ACTIS

## 2024-08-20 DEVICE — IMPLANTABLE DEVICE: Type: IMPLANTABLE DEVICE | Site: HIP | Status: FUNCTIONAL

## 2024-08-20 RX ORDER — PANTOPRAZOLE SODIUM 40 MG/1
40 TABLET, DELAYED RELEASE ORAL
Status: DISCONTINUED | OUTPATIENT
Start: 2024-08-21 | End: 2024-08-21 | Stop reason: HOSPADM

## 2024-08-20 RX ORDER — OXYCODONE HYDROCHLORIDE 5 MG/1
10 TABLET ORAL EVERY 4 HOURS PRN
Status: DISCONTINUED | OUTPATIENT
Start: 2024-08-20 | End: 2024-08-21 | Stop reason: HOSPADM

## 2024-08-20 RX ORDER — OXYCODONE HYDROCHLORIDE 5 MG/1
5 TABLET ORAL EVERY 4 HOURS PRN
Status: DISCONTINUED | OUTPATIENT
Start: 2024-08-20 | End: 2024-08-20 | Stop reason: HOSPADM

## 2024-08-20 RX ORDER — CYANOCOBALAMIN 1000 UG/ML
1000 INJECTION, SOLUTION INTRAMUSCULAR; SUBCUTANEOUS
Status: DISCONTINUED | OUTPATIENT
Start: 2024-09-15 | End: 2024-08-21 | Stop reason: HOSPADM

## 2024-08-20 RX ORDER — HYDROMORPHONE HYDROCHLORIDE 1 MG/ML
INJECTION, SOLUTION INTRAMUSCULAR; INTRAVENOUS; SUBCUTANEOUS AS NEEDED
Status: DISCONTINUED | OUTPATIENT
Start: 2024-08-20 | End: 2024-08-20

## 2024-08-20 RX ORDER — PROPOFOL 10 MG/ML
INJECTION, EMULSION INTRAVENOUS AS NEEDED
Status: DISCONTINUED | OUTPATIENT
Start: 2024-08-20 | End: 2024-08-20

## 2024-08-20 RX ORDER — IPRATROPIUM BROMIDE 0.5 MG/2.5ML
500 SOLUTION RESPIRATORY (INHALATION) ONCE
Status: DISCONTINUED | OUTPATIENT
Start: 2024-08-20 | End: 2024-08-20

## 2024-08-20 RX ORDER — IPRATROPIUM BROMIDE 0.5 MG/2.5ML
500 SOLUTION RESPIRATORY (INHALATION) ONCE
Status: DISCONTINUED | OUTPATIENT
Start: 2024-08-20 | End: 2024-08-20 | Stop reason: HOSPADM

## 2024-08-20 RX ORDER — SODIUM CHLORIDE, SODIUM LACTATE, POTASSIUM CHLORIDE, CALCIUM CHLORIDE 600; 310; 30; 20 MG/100ML; MG/100ML; MG/100ML; MG/100ML
20 INJECTION, SOLUTION INTRAVENOUS CONTINUOUS
Status: DISCONTINUED | OUTPATIENT
Start: 2024-08-20 | End: 2024-08-21 | Stop reason: HOSPADM

## 2024-08-20 RX ORDER — METOPROLOL SUCCINATE 50 MG/1
100 TABLET, EXTENDED RELEASE ORAL DAILY
Status: DISCONTINUED | OUTPATIENT
Start: 2024-08-21 | End: 2024-08-21 | Stop reason: HOSPADM

## 2024-08-20 RX ORDER — ONDANSETRON HYDROCHLORIDE 2 MG/ML
4 INJECTION, SOLUTION INTRAVENOUS EVERY 8 HOURS PRN
Status: DISCONTINUED | OUTPATIENT
Start: 2024-08-20 | End: 2024-08-21 | Stop reason: HOSPADM

## 2024-08-20 RX ORDER — ONDANSETRON HYDROCHLORIDE 2 MG/ML
INJECTION, SOLUTION INTRAVENOUS AS NEEDED
Status: DISCONTINUED | OUTPATIENT
Start: 2024-08-20 | End: 2024-08-20

## 2024-08-20 RX ORDER — OXYCODONE HYDROCHLORIDE 5 MG/1
5 TABLET ORAL EVERY 6 HOURS PRN
Status: DISCONTINUED | OUTPATIENT
Start: 2024-08-20 | End: 2024-08-21 | Stop reason: HOSPADM

## 2024-08-20 RX ORDER — ASPIRIN 81 MG/1
81 TABLET ORAL 2 TIMES DAILY
Status: DISCONTINUED | OUTPATIENT
Start: 2024-08-20 | End: 2024-08-21 | Stop reason: HOSPADM

## 2024-08-20 RX ORDER — DROPERIDOL 2.5 MG/ML
0.62 INJECTION, SOLUTION INTRAMUSCULAR; INTRAVENOUS ONCE AS NEEDED
Status: DISCONTINUED | OUTPATIENT
Start: 2024-08-20 | End: 2024-08-20 | Stop reason: SDUPTHER

## 2024-08-20 RX ORDER — ACETAMINOPHEN 325 MG/1
650 TABLET ORAL EVERY 6 HOURS SCHEDULED
Status: DISCONTINUED | OUTPATIENT
Start: 2024-08-20 | End: 2024-08-21 | Stop reason: HOSPADM

## 2024-08-20 RX ORDER — ALBUTEROL SULFATE 0.83 MG/ML
2.5 SOLUTION RESPIRATORY (INHALATION) ONCE AS NEEDED
Status: DISCONTINUED | OUTPATIENT
Start: 2024-08-20 | End: 2024-08-20 | Stop reason: SDUPTHER

## 2024-08-20 RX ORDER — LIDOCAINE HYDROCHLORIDE 20 MG/ML
INJECTION, SOLUTION INFILTRATION; PERINEURAL AS NEEDED
Status: DISCONTINUED | OUTPATIENT
Start: 2024-08-20 | End: 2024-08-20

## 2024-08-20 RX ORDER — ALBUTEROL SULFATE 0.83 MG/ML
2.5 SOLUTION RESPIRATORY (INHALATION) ONCE AS NEEDED
Status: DISCONTINUED | OUTPATIENT
Start: 2024-08-20 | End: 2024-08-20 | Stop reason: HOSPADM

## 2024-08-20 RX ORDER — SCOLOPAMINE TRANSDERMAL SYSTEM 1 MG/1
1 PATCH, EXTENDED RELEASE TRANSDERMAL ONCE
Status: DISCONTINUED | OUTPATIENT
Start: 2024-08-20 | End: 2024-08-21 | Stop reason: HOSPADM

## 2024-08-20 RX ORDER — CEFAZOLIN 1 G/1
500 INJECTION, POWDER, FOR SOLUTION INTRAVENOUS ONCE
Status: COMPLETED | OUTPATIENT
Start: 2024-08-20 | End: 2024-08-20

## 2024-08-20 RX ORDER — SODIUM CHLORIDE, SODIUM LACTATE, POTASSIUM CHLORIDE, CALCIUM CHLORIDE 600; 310; 30; 20 MG/100ML; MG/100ML; MG/100ML; MG/100ML
100 INJECTION, SOLUTION INTRAVENOUS CONTINUOUS
Status: DISCONTINUED | OUTPATIENT
Start: 2024-08-20 | End: 2024-08-20

## 2024-08-20 RX ORDER — SYRING-NEEDL,DISP,INSUL,0.3 ML 29 G X1/2"
297 SYRINGE, EMPTY DISPOSABLE MISCELLANEOUS ONCE AS NEEDED
Status: DISCONTINUED | OUTPATIENT
Start: 2024-08-20 | End: 2024-08-21 | Stop reason: HOSPADM

## 2024-08-20 RX ORDER — GABAPENTIN 300 MG/1
600 CAPSULE ORAL NIGHTLY
Status: DISCONTINUED | OUTPATIENT
Start: 2024-08-20 | End: 2024-08-21 | Stop reason: HOSPADM

## 2024-08-20 RX ORDER — ACETAMINOPHEN 325 MG/1
650 TABLET ORAL EVERY 4 HOURS PRN
Status: DISCONTINUED | OUTPATIENT
Start: 2024-08-20 | End: 2024-08-20

## 2024-08-20 RX ORDER — SODIUM CHLORIDE 0.9 G/100ML
IRRIGANT IRRIGATION AS NEEDED
Status: DISCONTINUED | OUTPATIENT
Start: 2024-08-20 | End: 2024-08-20 | Stop reason: HOSPADM

## 2024-08-20 RX ORDER — DROPERIDOL 2.5 MG/ML
0.62 INJECTION, SOLUTION INTRAMUSCULAR; INTRAVENOUS ONCE AS NEEDED
Status: DISCONTINUED | OUTPATIENT
Start: 2024-08-20 | End: 2024-08-20 | Stop reason: HOSPADM

## 2024-08-20 RX ORDER — BISACODYL 5 MG
10 TABLET, DELAYED RELEASE (ENTERIC COATED) ORAL DAILY PRN
Status: DISCONTINUED | OUTPATIENT
Start: 2024-08-20 | End: 2024-08-21 | Stop reason: HOSPADM

## 2024-08-20 RX ORDER — TRANEXAMIC ACID 100 MG/ML
INJECTION, SOLUTION INTRAVENOUS AS NEEDED
Status: DISCONTINUED | OUTPATIENT
Start: 2024-08-20 | End: 2024-08-20

## 2024-08-20 RX ORDER — LIDOCAINE HYDROCHLORIDE 10 MG/ML
0.1 INJECTION, SOLUTION EPIDURAL; INFILTRATION; INTRACAUDAL; PERINEURAL ONCE
Status: DISCONTINUED | OUTPATIENT
Start: 2024-08-20 | End: 2024-08-20 | Stop reason: HOSPADM

## 2024-08-20 RX ORDER — ACETAMINOPHEN 325 MG/1
650 TABLET ORAL EVERY 4 HOURS PRN
Status: DISCONTINUED | OUTPATIENT
Start: 2024-08-20 | End: 2024-08-20 | Stop reason: SDUPTHER

## 2024-08-20 RX ORDER — OXYCODONE HYDROCHLORIDE 5 MG/1
5 TABLET ORAL EVERY 4 HOURS PRN
Status: DISCONTINUED | OUTPATIENT
Start: 2024-08-20 | End: 2024-08-20 | Stop reason: SDUPTHER

## 2024-08-20 RX ORDER — CEFAZOLIN 1 G/1
INJECTION, POWDER, FOR SOLUTION INTRAVENOUS AS NEEDED
Status: DISCONTINUED | OUTPATIENT
Start: 2024-08-20 | End: 2024-08-20

## 2024-08-20 RX ORDER — POLYETHYLENE GLYCOL 3350 17 G/17G
17 POWDER, FOR SOLUTION ORAL DAILY
Status: DISCONTINUED | OUTPATIENT
Start: 2024-08-20 | End: 2024-08-21 | Stop reason: HOSPADM

## 2024-08-20 RX ORDER — ONDANSETRON HYDROCHLORIDE 2 MG/ML
4 INJECTION, SOLUTION INTRAVENOUS ONCE AS NEEDED
Status: DISCONTINUED | OUTPATIENT
Start: 2024-08-20 | End: 2024-08-20

## 2024-08-20 RX ORDER — CEFAZOLIN SODIUM 2 G/100ML
2 INJECTION, SOLUTION INTRAVENOUS EVERY 6 HOURS
Status: COMPLETED | OUTPATIENT
Start: 2024-08-20 | End: 2024-08-21

## 2024-08-20 RX ORDER — MIDAZOLAM HYDROCHLORIDE 1 MG/ML
INJECTION INTRAMUSCULAR; INTRAVENOUS
Status: DISPENSED
Start: 2024-08-20 | End: 2024-08-21

## 2024-08-20 RX ORDER — SODIUM CHLORIDE, SODIUM LACTATE, POTASSIUM CHLORIDE, CALCIUM CHLORIDE 600; 310; 30; 20 MG/100ML; MG/100ML; MG/100ML; MG/100ML
100 INJECTION, SOLUTION INTRAVENOUS CONTINUOUS
Status: DISCONTINUED | OUTPATIENT
Start: 2024-08-20 | End: 2024-08-20 | Stop reason: SDUPTHER

## 2024-08-20 RX ORDER — KETOROLAC TROMETHAMINE 30 MG/ML
15 INJECTION, SOLUTION INTRAMUSCULAR; INTRAVENOUS EVERY 6 HOURS
Status: DISCONTINUED | OUTPATIENT
Start: 2024-08-20 | End: 2024-08-21 | Stop reason: HOSPADM

## 2024-08-20 RX ORDER — SODIUM CHLORIDE 9 MG/ML
22 INJECTION INTRAMUSCULAR; INTRAVENOUS; SUBCUTANEOUS ONCE
Status: DISCONTINUED | OUTPATIENT
Start: 2024-08-20 | End: 2024-08-20 | Stop reason: HOSPADM

## 2024-08-20 RX ORDER — LIDOCAINE HYDROCHLORIDE 10 MG/ML
0.1 INJECTION, SOLUTION EPIDURAL; INFILTRATION; INTRACAUDAL; PERINEURAL ONCE
Status: DISCONTINUED | OUTPATIENT
Start: 2024-08-20 | End: 2024-08-20 | Stop reason: SDUPTHER

## 2024-08-20 RX ORDER — SODIUM CHLORIDE, SODIUM LACTATE, POTASSIUM CHLORIDE, CALCIUM CHLORIDE 600; 310; 30; 20 MG/100ML; MG/100ML; MG/100ML; MG/100ML
50 INJECTION, SOLUTION INTRAVENOUS CONTINUOUS
Status: DISCONTINUED | OUTPATIENT
Start: 2024-08-20 | End: 2024-08-21 | Stop reason: HOSPADM

## 2024-08-20 RX ORDER — BUPIVACAINE HCL/EPINEPHRINE 0.5-1:200K
30 VIAL (ML) INJECTION ONCE
Status: DISCONTINUED | OUTPATIENT
Start: 2024-08-20 | End: 2024-08-20 | Stop reason: HOSPADM

## 2024-08-20 RX ORDER — ROCURONIUM BROMIDE 10 MG/ML
INJECTION, SOLUTION INTRAVENOUS AS NEEDED
Status: DISCONTINUED | OUTPATIENT
Start: 2024-08-20 | End: 2024-08-20

## 2024-08-20 RX ORDER — ATORVASTATIN CALCIUM 40 MG/1
40 TABLET, FILM COATED ORAL DAILY
Status: DISCONTINUED | OUTPATIENT
Start: 2024-08-21 | End: 2024-08-21 | Stop reason: HOSPADM

## 2024-08-20 RX ORDER — NALOXONE HYDROCHLORIDE 1 MG/ML
0.2 INJECTION INTRAMUSCULAR; INTRAVENOUS; SUBCUTANEOUS EVERY 5 MIN PRN
Status: DISCONTINUED | OUTPATIENT
Start: 2024-08-20 | End: 2024-08-21 | Stop reason: HOSPADM

## 2024-08-20 RX ORDER — OXYCODONE HYDROCHLORIDE 5 MG/1
2.5 TABLET ORAL EVERY 4 HOURS PRN
Status: DISCONTINUED | OUTPATIENT
Start: 2024-08-20 | End: 2024-08-21 | Stop reason: HOSPADM

## 2024-08-20 RX ORDER — HYDROMORPHONE HYDROCHLORIDE 0.2 MG/ML
0.2 INJECTION INTRAMUSCULAR; INTRAVENOUS; SUBCUTANEOUS EVERY 4 HOURS PRN
Status: DISCONTINUED | OUTPATIENT
Start: 2024-08-20 | End: 2024-08-21 | Stop reason: HOSPADM

## 2024-08-20 RX ORDER — ONDANSETRON 4 MG/1
4 TABLET, FILM COATED ORAL EVERY 8 HOURS PRN
Status: DISCONTINUED | OUTPATIENT
Start: 2024-08-20 | End: 2024-08-21 | Stop reason: HOSPADM

## 2024-08-20 RX ORDER — IBUPROFEN 600 MG/1
600 TABLET ORAL 3 TIMES DAILY
Status: DISCONTINUED | OUTPATIENT
Start: 2024-08-22 | End: 2024-08-21 | Stop reason: HOSPADM

## 2024-08-20 RX ORDER — FENTANYL CITRATE 50 UG/ML
INJECTION, SOLUTION INTRAMUSCULAR; INTRAVENOUS AS NEEDED
Status: DISCONTINUED | OUTPATIENT
Start: 2024-08-20 | End: 2024-08-20

## 2024-08-20 RX ORDER — OXYCODONE HYDROCHLORIDE 5 MG/1
5 TABLET ORAL EVERY 4 HOURS PRN
Status: DISCONTINUED | OUTPATIENT
Start: 2024-08-20 | End: 2024-08-20

## 2024-08-20 RX ORDER — ONDANSETRON HYDROCHLORIDE 2 MG/ML
4 INJECTION, SOLUTION INTRAVENOUS ONCE AS NEEDED
Status: DISCONTINUED | OUTPATIENT
Start: 2024-08-20 | End: 2024-08-20 | Stop reason: SDUPTHER

## 2024-08-20 SDOH — SOCIAL STABILITY: SOCIAL INSECURITY: HAS ANYONE EVER THREATENED TO HURT YOUR FAMILY OR YOUR PETS?: NO

## 2024-08-20 SDOH — SOCIAL STABILITY: SOCIAL INSECURITY: ARE YOU OR HAVE YOU BEEN THREATENED OR ABUSED PHYSICALLY, EMOTIONALLY, OR SEXUALLY BY ANYONE?: NO

## 2024-08-20 SDOH — SOCIAL STABILITY: SOCIAL INSECURITY: HAVE YOU HAD THOUGHTS OF HARMING ANYONE ELSE?: NO

## 2024-08-20 SDOH — SOCIAL STABILITY: SOCIAL INSECURITY: DO YOU FEEL ANYONE HAS EXPLOITED OR TAKEN ADVANTAGE OF YOU FINANCIALLY OR OF YOUR PERSONAL PROPERTY?: NO

## 2024-08-20 SDOH — SOCIAL STABILITY: SOCIAL INSECURITY: DO YOU FEEL UNSAFE GOING BACK TO THE PLACE WHERE YOU ARE LIVING?: NO

## 2024-08-20 SDOH — SOCIAL STABILITY: SOCIAL INSECURITY: HAVE YOU HAD ANY THOUGHTS OF HARMING ANYONE ELSE?: NO

## 2024-08-20 SDOH — SOCIAL STABILITY: SOCIAL INSECURITY: ARE THERE ANY APPARENT SIGNS OF INJURIES/BEHAVIORS THAT COULD BE RELATED TO ABUSE/NEGLECT?: NO

## 2024-08-20 SDOH — SOCIAL STABILITY: SOCIAL INSECURITY: ABUSE: ADULT

## 2024-08-20 SDOH — SOCIAL STABILITY: SOCIAL INSECURITY: WERE YOU ABLE TO COMPLETE ALL THE BEHAVIORAL HEALTH SCREENINGS?: YES

## 2024-08-20 SDOH — ECONOMIC STABILITY: TRANSPORTATION INSECURITY
IN THE PAST 12 MONTHS, HAS LACK OF TRANSPORTATION KEPT YOU FROM MEETINGS, WORK, OR FROM GETTING THINGS NEEDED FOR DAILY LIVING?: NO

## 2024-08-20 SDOH — SOCIAL STABILITY: SOCIAL INSECURITY: DOES ANYONE TRY TO KEEP YOU FROM HAVING/CONTACTING OTHER FRIENDS OR DOING THINGS OUTSIDE YOUR HOME?: NO

## 2024-08-20 ASSESSMENT — COLUMBIA-SUICIDE SEVERITY RATING SCALE - C-SSRS
2. HAVE YOU ACTUALLY HAD ANY THOUGHTS OF KILLING YOURSELF?: NO
6. HAVE YOU EVER DONE ANYTHING, STARTED TO DO ANYTHING, OR PREPARED TO DO ANYTHING TO END YOUR LIFE?: NO
1. IN THE PAST MONTH, HAVE YOU WISHED YOU WERE DEAD OR WISHED YOU COULD GO TO SLEEP AND NOT WAKE UP?: NO

## 2024-08-20 ASSESSMENT — PAIN DESCRIPTION - LOCATION
LOCATION: HIP
LOCATION: HIP

## 2024-08-20 ASSESSMENT — PAIN SCALES - GENERAL
PAINLEVEL_OUTOF10: 0 - NO PAIN
PAINLEVEL_OUTOF10: 8
PAINLEVEL_OUTOF10: 5 - MODERATE PAIN
PAINLEVEL_OUTOF10: 1
PAINLEVEL_OUTOF10: 7
PAINLEVEL_OUTOF10: 0 - NO PAIN
PAINLEVEL_OUTOF10: 5 - MODERATE PAIN
PAINLEVEL_OUTOF10: 0 - NO PAIN
PAINLEVEL_OUTOF10: 7
PAINLEVEL_OUTOF10: 5 - MODERATE PAIN
PAINLEVEL_OUTOF10: 9
PAINLEVEL_OUTOF10: 4
PAINLEVEL_OUTOF10: 0 - NO PAIN
PAINLEVEL_OUTOF10: 0 - NO PAIN
PAINLEVEL_OUTOF10: 5 - MODERATE PAIN
PAINLEVEL_OUTOF10: 8
PAINLEVEL_OUTOF10: 0 - NO PAIN
PAIN_LEVEL: 0

## 2024-08-20 ASSESSMENT — COGNITIVE AND FUNCTIONAL STATUS - GENERAL
MOVING FROM LYING ON BACK TO SITTING ON SIDE OF FLAT BED WITH BEDRAILS: A LITTLE
PATIENT BASELINE BEDBOUND: NO
DAILY ACTIVITIY SCORE: 22
TOILETING: A LITTLE
TURNING FROM BACK TO SIDE WHILE IN FLAT BAD: A LITTLE
MOVING TO AND FROM BED TO CHAIR: A LITTLE
DRESSING REGULAR LOWER BODY CLOTHING: A LITTLE
CLIMB 3 TO 5 STEPS WITH RAILING: A LOT
MOBILITY SCORE: 17
WALKING IN HOSPITAL ROOM: A LITTLE
STANDING UP FROM CHAIR USING ARMS: A LITTLE

## 2024-08-20 ASSESSMENT — ACTIVITIES OF DAILY LIVING (ADL)
DRESSING YOURSELF: INDEPENDENT
FEEDING YOURSELF: INDEPENDENT
BATHING: INDEPENDENT
WALKS IN HOME: NEEDS ASSISTANCE
PATIENT'S MEMORY ADEQUATE TO SAFELY COMPLETE DAILY ACTIVITIES?: YES
HEARING - LEFT EAR: FUNCTIONAL
ADEQUATE_TO_COMPLETE_ADL: YES
HEARING - RIGHT EAR: FUNCTIONAL
TOILETING: NEEDS ASSISTANCE
GROOMING: INDEPENDENT
JUDGMENT_ADEQUATE_SAFELY_COMPLETE_DAILY_ACTIVITIES: YES
ASSISTIVE_DEVICE: WALKER

## 2024-08-20 ASSESSMENT — PAIN DESCRIPTION - ORIENTATION
ORIENTATION: RIGHT
ORIENTATION: RIGHT

## 2024-08-20 ASSESSMENT — LIFESTYLE VARIABLES
AUDIT-C TOTAL SCORE: 0
HOW MANY STANDARD DRINKS CONTAINING ALCOHOL DO YOU HAVE ON A TYPICAL DAY: PATIENT DOES NOT DRINK
SKIP TO QUESTIONS 9-10: 1
AUDIT-C TOTAL SCORE: 0
HOW OFTEN DO YOU HAVE A DRINK CONTAINING ALCOHOL: NEVER
HOW OFTEN DO YOU HAVE 6 OR MORE DRINKS ON ONE OCCASION: NEVER

## 2024-08-20 ASSESSMENT — PAIN - FUNCTIONAL ASSESSMENT
PAIN_FUNCTIONAL_ASSESSMENT: 0-10
PAIN_FUNCTIONAL_ASSESSMENT: 0-10

## 2024-08-20 ASSESSMENT — PATIENT HEALTH QUESTIONNAIRE - PHQ9
2. FEELING DOWN, DEPRESSED OR HOPELESS: NOT AT ALL
1. LITTLE INTEREST OR PLEASURE IN DOING THINGS: NOT AT ALL
SUM OF ALL RESPONSES TO PHQ9 QUESTIONS 1 & 2: 0

## 2024-08-20 NOTE — INTERVAL H&P NOTE
History and Physical Update:  I have reviewed the history and physical dated 7/25/24. History and physical reviewed and relevant findings noted.     Patient examined to review pertinent physical findings: No significant changes.   Home medications reviewed: No significant changes.   Allergies reviewed: No significant changes.     ERAS (Enhanced recovery after surgery): yes, CLAIRE Menchaca MD

## 2024-08-20 NOTE — OP NOTE
Op Note    Preoperative Diagnosis: Right hip arthritis, severe hip dysplasia    Postoperative Diagnosis: Same    PROCEDURE:   Procedure(s):  Right Hip Total Arthroplasty ~ Anterior Approach     Surgeon:  Kiara Menchaca MD     First Assist:  Dion, PGY 2  SA Carmelita    Anesthesia Staff:  Anesthesiologist: Enoc Pang MD; Ramiro Rubio MD  CRNA: Kamille Corado APRN-CRNA, HUY  C-AA: NICOLLE Eden; Junito Asher [facility]    Anesthesia Type: Spinal     Specimens:  No specimens collected    Estimated Blood Loss:  300 mL    Implants:  Implant Name Type Inv. Item Serial No.  Lot No. LRB No. Used Action   EMPHASYS ACETABULAR SHALL, THREE-HOLE, 54MM, CEMENTLESS    DEPUY [id number] Right 1 Implanted   EMPHASYS POLYETHYLENE LINER, NEUTRAL AOX, 54MM X 40MM    DEPUY [id number] Right 1 Implanted   SCREW CANCELLOUS 6.5 X 25 - [id number] Screw SCREW CANCELLOUS 6.5 X 25  DEPUY [id number] Right 1 Implanted   SCREW CANCELLOUS 6.5 X 20 - [id number] Screw SCREW CANCELLOUS 6.5 X 20  DEPUY [id number] Right 1 Implanted   STEM, ACTIS COLLAR, HIGH, SIZE 5 - [id number] Joint Hip STEM, ACTIS COLLAR, HIGH, SIZE 5  DEPUY [id number] Right 1 Implanted   HEAD, FEMORAL, DELTA TS CERAMIC 12/14, 40MM +5.0 - [id number] Joint HEAD, FEMORAL, DELTA TS CERAMIC 12/14, 40MM +5.0  DEPUY [id number] Right 1 Implanted        Findings: End-stage osteoarthritis, large pincer lesion, large cam deformity, significant eburnation of femoral head, large osteophytosis requiring resection    Clinical History:  64 y.o. male  with hip degenerative joint disease who failed conservative measures and treatment, and wished to undergo a total hip arthroplasty electively. The patient at this time was explained the risks and benefits of the surgery including infection, bleeding, damage to neurovascular structures and potential for continued pain, continued weakness, worsening pain and worsening weakness, stiffness, stability, limb length discrepancy, fracture, infection  requiring the resection arthroplasty and the need for revision surgery in the future, DVT prophylaxis and the risks for anesthetic complications and pulmonary complications and even death. The patient understood at this point under the risks and wished to proceed.    Description of procedure:  The patient was seen in the preoperative holding area and the appropriate side lower extremity was marked. The patient was brought to the Operating Room and placed supine on the Operating Room table. A preliminary anesthesia time out was then taken.    The neuraxial block anesthesia was initiated. The patient was placed supine. The antibiotics were administered within thirty minutes of incision time. The patient was then placed on the HANA table with the bilateral lower extremities in traction boots and the body stabilized against the perineal post.  At this point, the patient's surgical area was prepped and draped in the standard, sterile fashion.    A surgical time out was performed to confirm the correct side, procedure, and name. The patient received IV antibiotics and IV TXA. An approximate 8 cm longitudinal incision was made starting superolateral and distal to anterior, superior iliac spine. With a slight proximal medial to distal lateral angulation, the incision was carried down through the subcutaneous tissues down to the level of the tensor fascia wm. With the Rocio retractors in place, the tensor fascia wm was divided. Blunt dissection out of the fascia was done to identify the interval between the tensor fascia wm and the sartorius. An aufranc was placed proximally on the ilium. Once this was done with deep Rocio retractors, blunt dissection was used to identify the ascending branch of the lateral femoral circumflex artery and this was cauterized.    Next, the fat pad between the gluteus medius and the rectus was identified and dissected to the deep interval. At this point, a Victor was used to elevate the  iliocapsularis off the anterior capsule and an Aufranc retractor was placed along the medial neck. The reflected head of the rectus femoris was identified and preserved. At this point, a capsulotomy was performed along the axis of iliocapsularis and then along the superior border of lateralis and then proximally along the intertrochanteric ridge to the saddle of the femoral neck. The capsule was tagged with two # 2 ethibond sutures. The Aufranc retractors were now placed intracapsular and the head-neck junction was exposed.  Under direct visualization, a subcapital osteotomy was performed with a sagittal saw and the head was removed with a corkscrew.  The capsule was then released posterosuperiorly being careful to identify the interval between minimus and capsule and releasing around the superior capsular insertion from the femur.     The acetabulum was now visualized with the placement of retractors: Aufranc retractor over the posterior acetabulum, a C-retractor over anterior acetabulum. An episiotomy was made in the inferior capsule and straight Homann placed inferior, posterior acetabulum. At this point, the bovie was used to excise the labrum and the cotyloid fossa.     At this point, with a reamer, the acetabulum was medialized and then deepened sequentially up to the final size reamer. Fluoroscopy was used to assess the position of the reamer. Next, the final 54 mm acetabular component was impacted into place. This was done with the assistance of fluoroscopic guidance and position was confirmed anatomically. A 25 mm screw was placed for supplemental fixation.  A second 20 mm screw was placed in the peripheral dome for additional fixation.  Once this was accomplished, the final neutral 40 mm polyethylene liner was impacted into place. The osteophytes were removed using an osteotome.     Next, the attention was turned to the femur. The acetabular retractors were removed. A HANA hook was placed around the  proximal femur at the level of the G max insertion and lesser trochanter. The curved pointed Hohmann was placed over the greater trochanter laterally and a femoral retractor placed at the inferomedial neck. The conjoint tendon was visualized and released with the bovie cautery to expose the inner aspect of the greater trochanter. The piriformis and obturator externus tendons were preserved. The leg was then hyperextended and adducted slightly.  With this done, the midas anuja followed by canal finder was used to access in the intramedullary canal. This was followed by sequential broaching up to the final size consistent with the preoperative template. With the broach left in place and after calcar planning, the neck and trials were placed. The hip was reduced and found to be stable. Leg length, offset and implant size and position were assessed clinically and radiographically, and these were found to be consistent with the preoperative template.     Afterwards, the hip was dislocated. The trial components were removed, and the final size stem was placed in the canal and impacted into place and was found to be stable. The actual 40+5 mm head was now impacted into place after cleaning and drying the trunnion. The hip was reduced with gentle traction and internal rotation. Final leg length was measured on X-ray.    At this point, the hip was copiously irrigated. A periarticular injection was performed. Then the tagging ethibond sutures were tied together to reapproximate the capsulotomy and a few #2 ethibonds were used to complete closure of the capsulotomy. Copious irrigation was used throughout the closure.  The fascia overlying the TFL was now closed using 0 vicryl sutures. The remainder of the wound was closed in layers using 0-vicryl, 3-0 vicryl and skin closed using 3-0 stratafix and dermabond for skin. Sterile mepilex dressings were applied and the patient was transferred from the operating room table to the bed  and then transferred to the Recovery Room in stable condition. All sponge, needle, and instrument counts were correct at the end of the procedure. There were no complications. A surgical debrief was performed at the end of the case.    A 22 modifier is being added to this case for increased complexity secondary to severe hip dysplasia with large pincer and cam deformities and significant osteophytosis requiring resection.  This led to increased surgical time, need for additional help in the operating room, for additional exposure and additional retractor use as compared to a standard arthroplasty procedure.    Post-operative Plan:  Patient will be placed on anterior hip precautions and will mobilize with physical therapy. They will be started on aspirin for DVT prophylaxis along with mechanical compression devices. They will be discharged from the hospital when they have reached rehabilitation goals and their pain is controlled.    Attestation Statement:  I was present for and performed all critical portions of the procedure.    No qualified resident or fellow was available to assist in the case and so a physician assistant acted as first assistant for the procedure.  Their assistance was needed for help with retracting, exposure and reduction and dislocation maneuvers.      Kiara Menchaca MD

## 2024-08-20 NOTE — ANESTHESIA POSTPROCEDURE EVALUATION
Patient: Otto Yen    Procedure Summary       Date: 08/20/24 Room / Location: [facility] A OR 04 / Virtual [facility] A OR    Anesthesia Start: 1411 Anesthesia Stop: 1737    Procedure: Right Hip Total Arthroplasty ~ Anterior Approach (Right: Hip) Diagnosis:       Unilateral primary osteoarthritis, right hip      (Unilateral primary osteoarthritis, right hip [M16.11])    Surgeons: Kiara Menchaca MD Responsible Provider: Ramiro Rubio MD    Anesthesia Type: spinal ASA Status: 3            Anesthesia Type: spinal    Vitals Value Taken Time   /71 08/20/24 1802   Temp 36.3 °C (97.3 °F) 08/20/24 1731   Pulse 63 08/20/24 1809   Resp 12 08/20/24 1745   SpO2 100 % 08/20/24 1809   Vitals shown include unfiled device data.    Anesthesia Post Evaluation    Patient location during evaluation: bedside  Patient participation: complete - patient cannot participate  Level of consciousness: awake  Pain score: 0  Pain management: adequate  Airway patency: patent  Cardiovascular status: acceptable  Respiratory status: acceptable  Hydration status: acceptable  Postoperative Nausea and Vomiting: none        No notable events documented.

## 2024-08-20 NOTE — ANESTHESIA PREPROCEDURE EVALUATION
Patient: Otto Yen    Procedure Information       Date/Time: 08/20/24 1430    Procedure: Right Hip Total Arthroplasty ~ Anterior Approach (Right: Hip) - SIFI Block    Location: [facility] A OR 04 / [facility] A OR    Surgeons: Kiara Menchaca MD            Relevant Problems   Cardiac   (+) HTN (hypertension)   (+) Hyperlipidemia      Pulmonary   (+) MARCELA (obstructive sleep apnea)      Neuro   (+) Cerebral brain hemorrhage (Multi)      /Renal   (+) Chronic renal insufficiency      Hematology   (+) Anemia      Musculoskeletal   (+) Unilateral primary osteoarthritis, right hip      Nervous   (+) Hydrocephalus (Multi)       Clinical information reviewed:                   NPO Detail:  No data recorded     Physical Exam    Airway  Mallampati: III     Cardiovascular   Rhythm: regular  Rate: normal     Dental    Pulmonary    Abdominal            Anesthesia Plan    History of general anesthesia?: yes  History of complications of general anesthesia?: no    ASA 3     general     Anesthetic plan and risks discussed with patient.    Plan discussed with CRNA.

## 2024-08-20 NOTE — SIGNIFICANT EVENT
Orthopaedic Surgery Care Plan Note    64M (HTN, HLD, CKD, MARCELA, hydrocephalus s/p  shunt) s/p R BILLIE w Dr Menchaca on 8/20.    Plan:  - AP pelvis in PACU  - Clear liquid diet, okay to advance as tolerated.   - Bowel Regimen: senna, dulcolax.  - Multimodal pain therapy: scheduled tylenol, prn oxycodone, dilaudid prn for breakthrough  - mIVF to continue until patient is tolerating good PO intake, HLIV w/ good PO; Will monitor BMP on POD 1 and prn thereafter  - Weightbearing: WBAT RLE, anterior hip precautions. PT/OT consult, to see by POD1 at the latest.   - Perioperative ancef q8 for 24 hours  - No indication for transfusion; monitor CBC POD1, repeat prn thereafter.  - DVT PPx: SCD, ASA 81 mg BID for chemoPPx   - Maintain PIV  - Drain: none  - Continue home meds  - Glycemic: hx pre diabetes, hold home metformin    Dispo: pending PT    Discussed with the attending, Dr Menchaca.    Mei Lorenzo MD, PGY-2  Orthopaedic Surgery  On-call: [phone]  Epic Chat Preferred

## 2024-08-20 NOTE — DISCHARGE INSTRUCTIONS
Total Hip Arthroplasty   Postoperative Instructions    CONTACT INFORMATION  Kiara Menchaca MD  Joint Replacement Surgeon   Dorothea Escobar      [phone]     Rose Arredondo MBA, BSN, RN-BC  Ortho Coordinator [place]  [phone]    Joan Christianson RN, BSN  Ortho Coordinator [facility]  [phone]    Ria Lion BSN-BC  Ortho Nurse Navigator  [phone]    DRIVING AFTER SURGERY   Please discuss post-surgical, long-distance travel with your surgeon. You may not drive until you are off narcotics and cleared by your surgical team.     WOUND CARE   A waterproof dressing was placed over your surgical site. You may shower over this dressing after surgery and pat it dry. Please do not scrub, soak, or submerge your surgical site for at least three weeks after surgery to allow your incision to heal. Avoid using creams and ointments around your surgical site while it is healing.    Your dressing will be removed on post-operative day 7 by your physical therapist. You may leave the incision open to air after the bandage has been removed. Please do not submerge your incision in a hot tub/pool/lake/etc. until cleared by your surgeon. Please contact the office if there are any concerns with your wound.     Pain, swelling, and bruising are normal after total hip replacement surgery. You may alleviate these symptoms by following the post-operative pain regimen that was prescribed, icing your surgical site multiple times a day, and elevating your extremity throughout the day.     ACTIVITY AND HIP PRECAUTIONS  Please follow the post-operative activity and hip precautions that were described to you by your surgical team and physical therapists.    Weightbearing restriction: weightbearing as tolerated     If you had anterior total hip replacement surgery, please avoid excessive hip extension and external rotation.    If you had posterior total hip replacement surgery, please avoid hip flexion greater  than 90 degrees, adduction past midline, and internal rotation beyond neutral.    DISCHARGE MEDICATIONS  Pain  Please take the pain medications that were prescribed to you according to the prescribed schedule. You may not operate a motor vehicle while taking narcotic medications (e.g. Oxycodone, Tramadol).     Please take Tylenol and NSAIDs (if you are able) on a schedule as discussed in clinic. Please take the prescribed Pantoprazole to protect your stomach from ulceration after surgery while taking NSAIDs.     Please wean off the narcotic pain medications as soon as you are able.     Blood-Thinners  Please take the blood thinning medications that were prescribed according to the instructions from your surgeon. These are typically continued for 6 weeks postoperatively. This schedule may differ if you were already on blood-thinning medication prior to your surgery.     Nausea  You may feel nauseous after surgery due to the anesthetics or pain medications you are taking. You may take anti-nausea medication (e.g. Ondansetron) to help with these symptoms.     Stool Softeners   Please take the stool softeners that were prescribed at discharge (e.g. Colace, Senna) while you are on narcotic pain medications to minimize your risk of constipation.    Home Medications   You may restart your home medications at time of discharge according to your discharge instructions.    PHYSICAL THERAPY AND OCCUPATIONAL THERAPY   In-home physical therapy and occupational therapy will start within a few days of your discharge to home. You will transition to outpatient physical therapy around 2-3 weeks after your surgery.     FOLLOW-UP  You will see your surgical team around 2 weeks after surgery for a wound check (unless otherwise arranged) and between 4-6 weeks after surgery for X-rays and clinical evaluation.    CALL YOUR SURGEON IF YOU HAVE:   ° Drainage that is not contained by your surgical dressing.  ° Redness, pain or swelling in  your calf.   ° Severe pain that is not controlled by the pain regimen prescribed.   ° Fever >101 Fahrenheit presents for at least 48 hours or other signs of infection (wound drainage, redness around your surgical incision, increased joint pain)  ° Go to the emergency department or call 911 if you experience chest pain, shortness of breath or other emergent symptoms. Do not call your surgeon first.     ANTIBIOTIC PROPHYLAXIS AFTER JOINT REPLACEMENT SURGERY   Although it is a rare occurrence, an artificial joint can become infected by the bloodstream carrying infection from another part of the body to the replaced joint.  Therefore, it is important that any bacterial infection be treated promptly. If you are unsure of whether you need to take antibiotics, please call the office before taking any medication.  We advise that you take antibiotics prior to the following invasive procedures for a lifetime. Please wait at least 3-6 months after joint replacement surgery before undergoing dental work or cleaning.    Please take antibiotics one hour before any of the following procedures:  ° Routine dental cleaning or dental procedure  ° Root canal  ° Podiatry procedures that involve cutting into the skin  ° Dermatologic procedures that involve cutting into the skin.  (Not required for laser or freezing of skin)  ° Invasive procedures regarding the urinary tract     Antibiotics are not required for the following procedures:   ° Manicures/Pedicures  ° Colonoscopy/Endoscopy/Sigmoidoscopy  ° Routine gynecologic exams/procedures/PAP smears, D&C´s  ° Cataract/laser eye surgery  ° Injection or blood work  ° Routine colds and flu and minor cuts and bruises    You may have a flu shot at any time following your surgery.

## 2024-08-20 NOTE — ANESTHESIA PROCEDURE NOTES
Airway  Date/Time: 8/20/2024 2:17 PM  Urgency: elective    Airway not difficult    Staffing  Performed: CRNA   Authorized by: Enoc Pang MD    Performed by: HENRI Nielson-CRNA, HUY  Patient location during procedure: OR    Indications and Patient Condition  Indications for airway management: anesthesia and airway protection  Spontaneous ventilation: present  Sedation level: deep  Preoxygenated: yes  Patient position: sniffing  Mask difficulty assessment: 2 - vent by mask + OA or adjuvant +/- NMBA    Final Airway Details  Final airway type: endotracheal airway      Successful airway: ETT  Cuffed: yes   Successful intubation technique: video laryngoscopy  Facilitating devices/methods: intubating stylet  Endotracheal tube insertion site: oral  Blade size: #4  ETT size (mm): 7.5  Cormack-Lehane Classification: grade I - full view of glottis  Placement verified by: capnometry   Measured from: lips  ETT to lips (cm): 22  Number of attempts at approach: 1

## 2024-08-21 ENCOUNTER — PHARMACY VISIT (OUTPATIENT)
Dept: PHARMACY | Facility: CLINIC | Age: 64
End: 2024-08-21
Payer: COMMERCIAL

## 2024-08-21 ENCOUNTER — DOCUMENTATION (OUTPATIENT)
Dept: HOME HEALTH SERVICES | Facility: HOME HEALTH | Age: 64
End: 2024-08-21
Payer: COMMERCIAL

## 2024-08-21 VITALS
HEART RATE: 65 BPM | RESPIRATION RATE: 18 BRPM | SYSTOLIC BLOOD PRESSURE: 99 MMHG | BODY MASS INDEX: 31.89 KG/M2 | WEIGHT: 235.45 LBS | DIASTOLIC BLOOD PRESSURE: 57 MMHG | HEIGHT: 72 IN | TEMPERATURE: 98.2 F | OXYGEN SATURATION: 98 %

## 2024-08-21 LAB
ABO GROUP (TYPE) IN BLOOD: NORMAL
ANION GAP SERPL CALC-SCNC: 13 MMOL/L (ref 10–20)
ANTIBODY SCREEN: NORMAL
BUN SERPL-MCNC: 29 MG/DL (ref 6–23)
CALCIUM SERPL-MCNC: 8.4 MG/DL (ref 8.6–10.3)
CHLORIDE SERPL-SCNC: 103 MMOL/L (ref 98–107)
CO2 SERPL-SCNC: 24 MMOL/L (ref 21–32)
CREAT SERPL-MCNC: 1.35 MG/DL (ref 0.5–1.3)
EGFRCR SERPLBLD CKD-EPI 2021: 59 ML/MIN/1.73M*2
ERYTHROCYTE [DISTWIDTH] IN BLOOD BY AUTOMATED COUNT: 12.8 % (ref 11.5–14.5)
GLUCOSE SERPL-MCNC: 162 MG/DL (ref 74–99)
HCT VFR BLD AUTO: 29.2 % (ref 41–52)
HGB BLD-MCNC: 9.4 G/DL (ref 13.5–17.5)
MCH RBC QN AUTO: 29.2 PG (ref 26–34)
MCHC RBC AUTO-ENTMCNC: 32.2 G/DL (ref 32–36)
MCV RBC AUTO: 91 FL (ref 80–100)
NRBC BLD-RTO: 0 /100 WBCS (ref 0–0)
PLATELET # BLD AUTO: 216 X10*3/UL (ref 150–450)
POTASSIUM SERPL-SCNC: 4.1 MMOL/L (ref 3.5–5.3)
RBC # BLD AUTO: 3.22 X10*6/UL (ref 4.5–5.9)
RH FACTOR (ANTIGEN D): NORMAL
SODIUM SERPL-SCNC: 136 MMOL/L (ref 136–145)
WBC # BLD AUTO: 10 X10*3/UL (ref 4.4–11.3)

## 2024-08-21 PROCEDURE — 97161 PT EVAL LOW COMPLEX 20 MIN: CPT | Mod: GP

## 2024-08-21 PROCEDURE — 97116 GAIT TRAINING THERAPY: CPT | Mod: GP

## 2024-08-21 PROCEDURE — 2500000001 HC RX 250 WO HCPCS SELF ADMINISTERED DRUGS (ALT 637 FOR MEDICARE OP)

## 2024-08-21 PROCEDURE — 97110 THERAPEUTIC EXERCISES: CPT | Mod: GP

## 2024-08-21 PROCEDURE — 85027 COMPLETE CBC AUTOMATED: CPT

## 2024-08-21 PROCEDURE — 2500000004 HC RX 250 GENERAL PHARMACY W/ HCPCS (ALT 636 FOR OP/ED)

## 2024-08-21 PROCEDURE — 7100000011 HC EXTENDED STAY RECOVERY HOURLY - NURSING UNIT

## 2024-08-21 PROCEDURE — 9420000001 HC RT PATIENT EDUCATION 5 MIN

## 2024-08-21 PROCEDURE — 86901 BLOOD TYPING SEROLOGIC RH(D): CPT | Performed by: ANESTHESIOLOGY

## 2024-08-21 PROCEDURE — 36415 COLL VENOUS BLD VENIPUNCTURE: CPT

## 2024-08-21 PROCEDURE — 80048 BASIC METABOLIC PNL TOTAL CA: CPT

## 2024-08-21 RX ORDER — ONDANSETRON 4 MG/1
4 TABLET, ORALLY DISINTEGRATING ORAL EVERY 8 HOURS PRN
Qty: 42 TABLET | Refills: 0 | Status: SHIPPED | OUTPATIENT
Start: 2024-08-21 | End: 2024-09-04

## 2024-08-21 RX ORDER — ACETAMINOPHEN 500 MG
1000 TABLET ORAL EVERY 8 HOURS
Qty: 126 TABLET | Refills: 0 | Status: SHIPPED | OUTPATIENT
Start: 2024-08-21 | End: 2024-09-11

## 2024-08-21 RX ORDER — NAPROXEN SODIUM 220 MG/1
81 TABLET, FILM COATED ORAL 2 TIMES DAILY
Qty: 56 TABLET | Refills: 0 | Status: SHIPPED | OUTPATIENT
Start: 2024-08-21 | End: 2024-09-18

## 2024-08-21 RX ORDER — DOCUSATE SODIUM 100 MG/1
100 CAPSULE, LIQUID FILLED ORAL 2 TIMES DAILY
Qty: 14 CAPSULE | Refills: 0 | Status: SHIPPED | OUTPATIENT
Start: 2024-08-21 | End: 2024-08-28

## 2024-08-21 RX ORDER — TRAMADOL HYDROCHLORIDE 50 MG/1
50 TABLET ORAL EVERY 6 HOURS PRN
Qty: 28 TABLET | Refills: 0 | Status: SHIPPED | OUTPATIENT
Start: 2024-08-21 | End: 2024-08-28

## 2024-08-21 RX ORDER — OXYCODONE HYDROCHLORIDE 5 MG/1
5-10 TABLET ORAL EVERY 6 HOURS PRN
Qty: 40 TABLET | Refills: 0 | Status: SHIPPED | OUTPATIENT
Start: 2024-08-21 | End: 2024-08-28

## 2024-08-21 RX ORDER — PANTOPRAZOLE SODIUM 40 MG/1
40 TABLET, DELAYED RELEASE ORAL
Qty: 28 TABLET | Refills: 0 | Status: SHIPPED | OUTPATIENT
Start: 2024-08-21 | End: 2024-09-18

## 2024-08-21 RX ORDER — SENNOSIDES 8.6 MG/1
1 TABLET ORAL DAILY
Qty: 15 TABLET | Refills: 0 | Status: SHIPPED | OUTPATIENT
Start: 2024-08-21 | End: 2024-09-05

## 2024-08-21 ASSESSMENT — COGNITIVE AND FUNCTIONAL STATUS - GENERAL
MOVING FROM LYING ON BACK TO SITTING ON SIDE OF FLAT BED WITH BEDRAILS: A LITTLE
WALKING IN HOSPITAL ROOM: A LITTLE
MOBILITY SCORE: 18
CLIMB 3 TO 5 STEPS WITH RAILING: A LITTLE
TURNING FROM BACK TO SIDE WHILE IN FLAT BAD: A LITTLE
STANDING UP FROM CHAIR USING ARMS: A LITTLE
MOVING TO AND FROM BED TO CHAIR: A LITTLE

## 2024-08-21 ASSESSMENT — ACTIVITIES OF DAILY LIVING (ADL)
ADL_ASSISTANCE: INDEPENDENT
LACK_OF_TRANSPORTATION: NO

## 2024-08-21 ASSESSMENT — PAIN - FUNCTIONAL ASSESSMENT: PAIN_FUNCTIONAL_ASSESSMENT: 0-10

## 2024-08-21 ASSESSMENT — PAIN DESCRIPTION - LOCATION: LOCATION: HIP

## 2024-08-21 ASSESSMENT — PAIN DESCRIPTION - ORIENTATION: ORIENTATION: RIGHT

## 2024-08-21 ASSESSMENT — PAIN SCALES - GENERAL
PAINLEVEL_OUTOF10: 2
PAINLEVEL_OUTOF10: 5 - MODERATE PAIN
PAINLEVEL_OUTOF10: 2
PAINLEVEL_OUTOF10: 0 - NO PAIN

## 2024-08-21 NOTE — PROGRESS NOTES
08/21/24 1037   Discharge Planning   Living Arrangements Spouse/significant other   Support Systems Spouse/significant other;Friends/neighbors   Assistance Needed Patient has a walker and 4 prong cane prior to admission.  Caregiver, Lesly, has arranged a private nurse to stay with patient for a few days.   Type of Residence Private residence   Number of Stairs to Enter Residence 3   Number of Stairs Within Residence 14  (Patient has 1st floor living and will not need to do stairs)   Do you have animals or pets at home? No   Who is requesting discharge planning? Provider   Home or Post Acute Services In home services   Type of Home Care Services Home OT;Home PT   Expected Discharge Disposition HH Services   Does the patient need discharge transport arranged? No   Financial Resource Strain   How hard is it for you to pay for the very basics like food, housing, medical care, and heating? Not hard   Housing Stability   In the last 12 months, was there a time when you were not able to pay the mortgage or rent on time? N   In the past 12 months, how many times have you moved where you were living? 0   At any time in the past 12 months, were you homeless or living in a shelter (including now)? N   Transportation Needs   In the past 12 months, has lack of transportation kept you from medical appointments or from getting medications? no   In the past 12 months, has lack of transportation kept you from meetings, work, or from getting things needed for daily living? No   Patient Choice   Provider Choice list and CMS website (https://medicare.gov/care-compare#search) for post-acute Quality and Resource Measure Data were provided and reviewed with: Patient   Patient / Family choosing to utilize agency / facility established prior to hospitalization Yes     Met with patient and his caregiver, Lesly, at the bedside to discuss discharge plan.  He is POD 1 for a right BILLIE  PLAN/BARRIER: PT, meds to beds  DISP: home with  [place], SOC 8/22  ADOD: today  Sandra Villeda RN

## 2024-08-21 NOTE — NURSING NOTE
Interdisciplinary team present: NP, PT, NM, CC, SW, Orthopedic Coordinator, and bedside RN.  Pain - controlled  Nausea - none  Discharge barrier - needs PT eval  Discharge plan - home with [facility]  Discharge date/time - today or tomorrow

## 2024-08-21 NOTE — PROGRESS NOTES
Physical Therapy    Physical Therapy Evaluation & Treatment    Patient Name: Otto Yen  MRN: [id number]  Today's Date: 8/21/2024   Time Calculation  Start Time: 0827  Stop Time: 0910  Time Calculation (min): 43 min    Assessment/Plan   PT Assessment  PT Assessment Results: Decreased strength, Decreased endurance, Impaired balance, Decreased mobility, Decreased range of motion, Pain, Orthopedic restrictions  Rehab Prognosis: Good  Evaluation/Treatment Tolerance: Patient tolerated treatment well  Medical Staff Made Aware: Yes  Strengths: Ability to acquire knowledge, Insight into problems, Physical health, Premorbid level of function, Support and attitude of living partners  Barriers to Participation: Comorbidities  End of Session Communication: Bedside nurse  Assessment Comment: Pt is POD #1  s/p ant BILLIE with anterior hip precautions and WBAT. The pt presents with decreased safety and decreased independence with bed mobility, transfers, and gait/stairs. Contributing to these impairment are post-op pain, decreased R hip ROM, decreased R hip strength, decreased balance, and poor stability. The pt would benefit from continued PT to assess progress and further therapy needs, address the above functional limitations and impairments to improve independence and safety and allow for an anticipated d/c with low intensity PT and assist PRN once medically stable at D/C.  End of Session Patient Position: Up in chair, Alarm on   IP OR SWING BED PT PLAN  Inpatient or Swing Bed: Inpatient  PT Plan  Treatment/Interventions: Bed mobility, Transfer training, Gait training, Stair training, Balance training, Neuromuscular re-education, Strengthening, Endurance training, Therapeutic exercise, Therapeutic activity, Home exercise program  PT Plan: Ongoing PT  PT Frequency: BID  PT Discharge Recommendations: Low intensity level of continued care  Equipment Recommended upon Discharge:  (owns FWW)  PT Recommended Transfer Status: Stand by  assist  PT - OK to Discharge: Yes (PT POC initiated this date)      Subjective     General Visit Information:  General  Reason for Referral: Pt is a 63 yo male POD #1 s/p R anterior BILLIE  Referred By: Dr. Menchaca  Past Medical History Relevant to Rehab:   Past Medical History:   Diagnosis Date    Alcohol abuse, in remission     Quit 12/2023    Anemia 06/10/2024    H/H 11.9/35.7    Arthritis     Cataract     Chronic kidney disease     GFR-55, Creat-1.43, BUN-36 6/10/24    Eczema     HLD (hyperlipidemia)     HTN (hypertension)     Hydrocephalus (Multi)     as child, shunt placed at age 1    Hypertension     Intraventricular hemorrhage (Multi)     spontaneous, craniotomy at age 29 for SDH evacuation    Irritable bowel syndrome     Liver disease     Liver enzymes normalized after stopped drinking alcohol 2023    Pre-diabetes     on metformin, A1C= 5.9% on 9/22/22    Reactive airway disease (HHS-HCC)     no current inhaler use or need, used inhaler once 2/2 dust allergy    Shuffling gait     recent increase; seeing neurosurgery 8/5/24 for 2nd opinion    Sleep apnea     uses CPAP nightly, ? setting    Squamous cell skin cancer     head       Prior to Session Communication: Bedside nurse  Patient Position Received: Bed, 3 rail up, Alarm on  General Comment: Pt cleared for PT evaluation by primary RN. Pt pleasant and agreeable to PT evaluation  Home Living:  Home Living  Type of Home: House  Lives With: Alone  Home Adaptive Equipment: Walker rolling or standard, Cane (SBQC)  Home Layout: One level  Home Access: Stairs to enter with rails  Entrance Stairs-Rails:  (unilateral (newly installed))  Entrance Stairs-Number of Steps: 2-3  Bathroom Shower/Tub: Walk-in shower  Bathroom Toilet: Handicapped height  Bathroom Equipment: Grab bars in shower, Grab bars around toilet  Prior Level of Function:  Prior Function Per Pt/Caregiver Report  Level of [place]: Independent with ADLs and functional transfers, Independent with  "homemaking with ambulation  Receives Help From:  (Pt reports brother-in-law lives nearby. Family such as \"brother\" and \"lorraine\" able to assist initially at home going)  ADL Assistance: Independent  Homemaking Assistance: Independent  Ambulatory Assistance: Independent (Recent use of FWW due to R hip pain and physician recommendation. No device vs SBQC used prior)  Prior Function Comments: (+) driving  Precautions:  Precautions  LE Weight Bearing Status: Weight Bearing as Tolerated  Medical Precautions: Fall precautions  Post-Surgical Precautions: Left hip precautions (anterior)  Vital Signs:       Objective   Pain:  Pain Assessment  Pain Assessment: 0-10  0-10 (Numeric) Pain Score: 0 - No pain (Pt reports hip \"feels different\" but denies pain)  Pain Interventions: Repositioned, Ambulation/increased activity  Cognition:  Cognition  Overall Cognitive Status: Within Functional Limits  Orientation Level: Oriented X4  Attention: Within Functional Limits  Memory: Within Funtional Limits  Insight: Within function limits  Impulsive: Within functional limits    General Assessments:  Activity Tolerance  Endurance: Tolerates 30 min exercise with multiple rests    Sensation  Light Touch: No apparent deficits    Coordination  Movements are Fluid and Coordinated: Yes    Static Sitting Balance  Static Sitting-Balance Support: No upper extremity supported  Static Sitting-Level of Assistance: Modified independent  Static Sitting-Comment/Number of Minutes: 2 min    Static Standing Balance  Static Standing-Balance Support: Bilateral upper extremity supported (FWW)  Static Standing-Level of Assistance: Close supervision, Distant supervision  Static Standing-Comment/Number of Minutes: 2 min  Functional Assessments:  Bed Mobility  Bed Mobility: Yes  Bed Mobility 1  Bed Mobility 1: Supine to sitting, Sitting to supine  Level of Assistance 1: Minimum assistance, Close supervision  Bed Mobility Comments 1: Initial min A to cleared RLE from bed " "progressing to CGA-SBA    Transfers  Transfer: Yes  Transfer 1  Transfer From 1: Stand to  Transfer to 1: Sit  Technique 1: Sit to stand, Stand to sit  Transfer Device 1: Walker, Gait belt  Transfer Level of Assistance 1: Close supervision  Trials/Comments 1: VCs for hand placement and sequencing  Transfers 2  Trials/Comments 2: Educated on car transfer sequencing    Ambulation/Gait Training  Ambulation/Gait Training Performed: Yes  Ambulation/Gait Training 1  Surface 1: Level tile  Device 1: Rolling walker  Gait Support Devices: Gait belt  Assistance 1: Close supervision  Quality of Gait 1:  (R step to sequencing progressing to step through. Reduced B step length and sacha. VCs for FWW proximity)  Comments/Distance (ft) 1: 150ft and 130ft  Extremity/Trunk Assessments:  RLE   RLE : Exceptions to WFL (grossly3-/5 at R hip, 4/5 at R knee and 5/5 at ankle)  LLE   LLE : Within Functional Limits  Treatments:  Therapeutic Exercise  Therapeutic Exercise Performed: Yes  Therapeutic Exercise Activity 1: Pt instructed in R anterior BILLIE HEP including x10 reps ankle pumps, quad sets, glute sets, heel slides, SAQ, hip abd/add, SLR and LAQ (AAROM with hip abd/add and SLR)    Ambulation/Gait Training  Ambulation/Gait Training Performed: Yes  Ambulation/Gait Training 1  Surface 1: Level tile  Device 1: Rolling walker  Gait Support Devices: Gait belt  Assistance 1: Close supervision  Quality of Gait 1:  (R step to sequencing progressing to step through. Reduced B step length and sacha. VCs for FWW proximity)  Comments/Distance (ft) 1: 150ft and 130ft (2nd trial part of treatment session)  Stairs  Stairs: Yes  Stairs  Rails 1: Left, Bilateral  Device 1: No device  Support Devices 1: Gait belt  Assistance 1: Contact guard, Close supervision  Comment/Number of Steps 1: Completed x4 6\" stairs. Initial x2 steps with BHRs and then progressed to unilateral HR. VCs provided for sequencing including step to pattern  Outcome " Measures:  [facility] Basic Mobility  Turning from your back to your side while in a flat bed without using bedrails: A little  Moving from lying on your back to sitting on the side of a flat bed without using bedrails: A little  Moving to and from bed to chair (including a wheelchair): A little  Standing up from a chair using your arms (e.g. wheelchair or bedside chair): A little  To walk in hospital room: A little  Climbing 3-5 steps with railing: A little  Basic Mobility - Total Score: 18    Encounter Problems       Encounter Problems (Active)       Balance          Mobility       LTG - Patient will navigate 4 steps with rails/device       Start:  08/21/24    Expected End:  09/04/24       Mod Ind         STG - Patient will ambulate       Start:  08/21/24    Expected End:  09/04/24       Mod Ind using FWW >200ft            Mobility       HEP       Start:  08/21/24    Expected End:  09/04/24       Pt will demonstrate ind with R ant BILLIE HEP            PT Transfers       STG - Patient will perform bed mobility       Start:  08/21/24    Expected End:  09/04/24       Mod Ind         STG - Patient will transfer sit to and from stand       Start:  08/21/24    Expected End:  09/04/24       Mod Ind            Pain - Adult              Education Documentation  Handouts, taught by Darrell Welch, PT at 8/21/2024  9:25 AM.  Learner: Patient  Readiness: Acceptance  Method: Explanation, Demonstration, Handout  Response: Verbalizes Understanding    Precautions, taught by Darrell Welch, PT at 8/21/2024  9:25 AM.  Learner: Patient  Readiness: Acceptance  Method: Explanation, Demonstration, Handout  Response: Verbalizes Understanding    Body Mechanics, taught by Darrell Welch, PT at 8/21/2024  9:25 AM.  Learner: Patient  Readiness: Acceptance  Method: Explanation, Demonstration, Handout  Response: Verbalizes Understanding    Home Exercise Program, taught by Darrell Welch, PT at 8/21/2024  9:25 AM.  Learner:  Patient  Readiness: Acceptance  Method: Explanation, Demonstration, Handout  Response: Verbalizes Understanding    Mobility Training, taught by Darrell Welch, PT at 8/21/2024  9:25 AM.  Learner: Patient  Readiness: Acceptance  Method: Explanation, Demonstration, Handout  Response: Verbalizes Understanding    Education Comments  No comments found.

## 2024-08-21 NOTE — HH CARE COORDINATION
Home Care received a Referral for Physical Therapy and Occupational Therapy. We have processed the referral for a Start of Care on 08/22/2024.     If you have any questions or concerns, please feel free to contact us at [phone]. Follow the prompts, enter your five digit zip code, and you will be directed to your care team on [facility].

## 2024-08-21 NOTE — NURSING NOTE
Met with Patient and Care Partner at bedside- Patient is s/p Right Anterior Hip Replacement with Dr. Menchaca.  Discussion with patient included education on the following topics: [facility] Education: Wound Care, Post-Op Activity, Post-Op Precautions, Cold-Therapy, Importance of post-op prescriptions, When to call the Surgeon's Office, Use of MyChart, and When to call 9-1-1.  Patient attended joint class prior to surgery.  Patient is able to verbalize understanding of class content/discussion.  Contact information was provided to patient for support and assistance during the post-operative period.

## 2024-08-21 NOTE — CARE PLAN
Problem: Pain - Adult  Goal: Verbalizes/displays adequate comfort level or baseline comfort level  Outcome: Progressing     Problem: Safety - Adult  Goal: Free from fall injury  Outcome: Progressing     Problem: Discharge Planning  Goal: Discharge to home or other facility with appropriate resources  Outcome: Progressing     Problem: Chronic Conditions and Co-morbidities  Goal: Patient's chronic conditions and co-morbidity symptoms are monitored and maintained or improved  Outcome: Progressing     Problem: Pain  Goal: Takes deep breaths with improved pain control throughout the shift  Outcome: Progressing  Goal: Turns in bed with improved pain control throughout the shift  Outcome: Progressing  Goal: Walks with improved pain control throughout the shift  Outcome: Progressing  Goal: Performs ADL's with improved pain control throughout shift  Outcome: Progressing  Goal: Participates in PT with improved pain control throughout the shift  Outcome: Progressing  Goal: Free from opioid side effects throughout the shift  Outcome: Progressing  Goal: Free from acute confusion related to pain meds throughout the shift  Outcome: Progressing     Problem: Fall/Injury  Goal: Not fall by end of shift  Outcome: Progressing  Goal: Be free from injury by end of the shift  Outcome: Progressing  Goal: Verbalize understanding of personal risk factors for fall in the hospital  Outcome: Progressing  Goal: Verbalize understanding of risk factor reduction measures to prevent injury from fall in the home  Outcome: Progressing  Goal: Use assistive devices by end of the shift  Outcome: Progressing  Goal: Pace activities to prevent fatigue by end of the shift  Outcome: Progressing     
The patient's goals for the shift include      The clinical goals for the shift include remain comfortable throughout shift      Problem: Pain - Adult  Goal: Verbalizes/displays adequate comfort level or baseline comfort level  Outcome: Progressing     Problem: Safety - Adult  Goal: Free from fall injury  Outcome: Progressing     Problem: Discharge Planning  Goal: Discharge to home or other facility with appropriate resources  Outcome: Progressing     Problem: Chronic Conditions and Co-morbidities  Goal: Patient's chronic conditions and co-morbidity symptoms are monitored and maintained or improved  Outcome: Progressing     Problem: Pain  Goal: Takes deep breaths with improved pain control throughout the shift  Outcome: Progressing  Goal: Turns in bed with improved pain control throughout the shift  Outcome: Progressing  Goal: Walks with improved pain control throughout the shift  Outcome: Progressing  Goal: Performs ADL's with improved pain control throughout shift  Outcome: Progressing  Goal: Participates in PT with improved pain control throughout the shift  Outcome: Progressing  Goal: Free from opioid side effects throughout the shift  Outcome: Progressing  Goal: Free from acute confusion related to pain meds throughout the shift  Outcome: Progressing     Problem: Fall/Injury  Goal: Not fall by end of shift  Outcome: Progressing  Goal: Be free from injury by end of the shift  Outcome: Progressing  Goal: Verbalize understanding of personal risk factors for fall in the hospital  Outcome: Progressing  Goal: Verbalize understanding of risk factor reduction measures to prevent injury from fall in the home  Outcome: Progressing  Goal: Use assistive devices by end of the shift  Outcome: Progressing  Goal: Pace activities to prevent fatigue by end of the shift  Outcome: Progressing     
14-Sep-2023 10:02
11-Oct-2023 15:45

## 2024-08-21 NOTE — DISCHARGE SUMMARY
Discharge Diagnosis  Unilateral primary osteoarthritis, right hip    Issues Requiring Follow-Up  S/P right total hip replacement     Test Results Pending At Discharge  Pending Labs       No current pending labs.            Hospital Course   Briefly, the patient is a 64 year-old male with past Hx of osteoarthritis of right hip.  Pt is now S/P right total hip replacement.       HOSPITAL COURSE: The patient underwent right BILLIE on 8/20/24 which patient tolerated well and remained stable in the postoperative period. Patient was ordered oral and intravenous narcotics for pain control.  On day of discharge patient was tolerating a diet well, afebrile with stable vital signs and lab values, and had adequate pain control. The wound was clean and dry. Patient was instructed to follow up in the office within 2 weeks and was given prescription for oxycodone and tramadol for pain. Aspirin 81 mg twice daily was prescribed and compression stockings were provided for DVT prophylaxis.  Colace and Senna were prescribed as needed for constipation. Home PT/ OT was arranged prior to discharge.      PE:  Constitutional: A&Ox3, calm and cooperative, NAD, drowsy  Eyes: EOMI, clear sclera   Cardiovascular: Normal rate and regular rhythm. No murmurs  Respiratory/Thorax: CTAB, on RA  Genitourinary: Voiding independently - clear yellow   Musculoskeletal: right hip mepilex CDI w/o surrounding erythema or drainage. fires EHL/FHL/TA/TP/EDL/FDL. SILT in SP/DP/T distribution. 2+ DP/PT, <2 CRT. Compartments soft, compressible.  Extremities: compression socks in place  Neurological: A&Ox3, No focal deficits   Psychological: Appropriate mood and behavior      Home Medications     Medication List      START taking these medications     aspirin 81 mg chewable tablet; Chew 1 tablet (81 mg) 2 times a day for   28 days.   cefadroxil 500 mg capsule; Commonly known as: Duricef; Take 1 capsule   (500 mg) by mouth 2 times a day for 7 days.   docusate sodium  100 mg capsule; Commonly known as: Colace; Take 1   capsule (100 mg) by mouth 2 times a day for 7 days.   ondansetron ODT 4 mg disintegrating tablet; Commonly known as:   Zofran-ODT; Take 1 tablet (4 mg) by mouth every 8 hours if needed for   nausea or vomiting for up to 14 days.   oxyCODONE 5 mg immediate release tablet; Commonly known as: Roxicodone;   Take 1-2 tablets (5-10 mg) by mouth every 6 hours if needed for severe   pain (7 - 10) for up to 7 days.   Pain Relief ES (acetaminophen) 500 mg tablet; Generic drug:   acetaminophen; Take 2 tablets (1,000 mg) by mouth every 8 hours for 21   days.   pantoprazole 40 mg EC tablet; Commonly known as: ProtoNix; Take 1 tablet   (40 mg) by mouth once daily in the morning. Take before meals for 28 days.   Do not crush, chew, or split.   senna 8.6 mg tablet; Generic drug: sennosides; Take 1 tablet (8.6 mg) by   mouth once daily for 15 days.   traMADol 50 mg tablet; Commonly known as: Ultram; Take 1 tablet (50 mg)   by mouth every 6 hours if needed for severe pain (7 - 10) for up to 7   days.     CONTINUE taking these medications     atorvastatin 40 mg tablet; Commonly known as: Lipitor   cyanocobalamin 1,000 mcg/mL injection; Commonly known as: Vitamin B-12   diclofenac sodium 1 % gel; Commonly known as: Voltaren   gabapentin 600 mg tablet; Commonly known as: Neurontin   glucosamine-chondroitin 500-400 mg tablet   * ketoconazole 2 % shampoo; Commonly known as: NIZOral   * ketoconazole 2 % cream; Commonly known as: NIZOral   lidocaine 5 % patch; Commonly known as: Lidoderm   metFORMIN  mg 24 hr tablet; Commonly known as: Glucophage-XR   metoprolol succinate  mg 24 hr tablet; Commonly known as:   Toprol-XL   multivitamin tablet   ramipril 10 mg capsule; Commonly known as: Altace   thiamine 500 mg tablet; Commonly known as: Vitamin B-1   verapamil  mg 24 hr capsule; Commonly known as: Veralan PM   VITAMIN D3-VITAMIN K2 ORAL  * This list has 2 medication(s)  that are the same as other medications   prescribed for you. Read the directions carefully, and ask your doctor or   other care provider to review them with you.     STOP taking these medications     chlorhexidine 0.12 % solution; Commonly known as: Peridex   ondansetron 4 mg tablet; Commonly known as: Zofran       Outpatient Follow-Up  Future Appointments   Date Time Provider Department [place]   8/22/2024 To Be Determined Leticia Osborn, OT [facility]   8/23/2024 To Be Determined Natasha Patterson, PT [facility]   8/30/2024 11:20 AM Kiara Menchaca MD [id number] [place]   9/4/2024  8:45 AM Jesse Amandam, PT [id number] [place]   9/10/2024 10:15 AM Jesse Amandam, PT [id number] [place]   9/10/2024  3:30 PM Holly Musa, PT [id number] [place]   9/13/2024  7:00 AM Jesse Amandam, PT [id number] [place]   9/17/2024  3:15 PM Jesse D Stirm, PT [id number] [place]   9/20/2024  3:30 PM Jesse D Stirm, PT [id number] [place]   9/24/2024  4:45 PM Jesse HUNT Stirm, PT [id number] [place]   9/27/2024  3:30 PM Jesse D Stirm, PT [id number] [place]   10/1/2024  4:45 PM Jesse D Stirm, PT [id number] [place]   10/4/2024  3:30 PM Jesse Amandam, PT [id number] [place]   10/11/2024  1:40 PM Clarence Matute MD [id number] [place]   11/1/2024  3:00 PM Christiano Lanza MD [id number] [place]   7/3/2025  8:00 AM Jared Guzman MD [id number] [place]       Regi Mazariegos PA-C

## 2024-08-21 NOTE — NURSING NOTE

## 2024-08-22 ENCOUNTER — TELEPHONE (OUTPATIENT)
Dept: ORTHOPEDIC SURGERY | Facility: HOSPITAL | Age: 64
End: 2024-08-22
Payer: COMMERCIAL

## 2024-08-22 ENCOUNTER — HOME CARE VISIT (OUTPATIENT)
Dept: HOME HEALTH SERVICES | Facility: HOME HEALTH | Age: 64
End: 2024-08-22
Payer: COMMERCIAL

## 2024-08-22 ENCOUNTER — HOSPITAL ENCOUNTER (EMERGENCY)
Facility: HOSPITAL | Age: 64
Discharge: HOME | End: 2024-08-22
Attending: STUDENT IN AN ORGANIZED HEALTH CARE EDUCATION/TRAINING PROGRAM
Payer: COMMERCIAL

## 2024-08-22 VITALS
RESPIRATION RATE: 18 BRPM | HEART RATE: 80 BPM | SYSTOLIC BLOOD PRESSURE: 86 MMHG | DIASTOLIC BLOOD PRESSURE: 50 MMHG | OXYGEN SATURATION: 90 % | TEMPERATURE: 98.4 F

## 2024-08-22 VITALS
WEIGHT: 200 LBS | BODY MASS INDEX: 27.12 KG/M2 | RESPIRATION RATE: 18 BRPM | HEART RATE: 72 BPM | DIASTOLIC BLOOD PRESSURE: 77 MMHG | TEMPERATURE: 99 F | OXYGEN SATURATION: 100 % | SYSTOLIC BLOOD PRESSURE: 99 MMHG

## 2024-08-22 DIAGNOSIS — I95.9 HYPOTENSION, UNSPECIFIED HYPOTENSION TYPE: Primary | ICD-10-CM

## 2024-08-22 LAB
ANION GAP SERPL CALC-SCNC: 11 MMOL/L (ref 10–20)
BASOPHILS # BLD AUTO: 0.05 X10*3/UL (ref 0–0.1)
BASOPHILS NFR BLD AUTO: 0.6 %
BUN SERPL-MCNC: 35 MG/DL (ref 6–23)
CALCIUM SERPL-MCNC: 8.4 MG/DL (ref 8.6–10.3)
CARDIAC TROPONIN I PNL SERPL HS: 7 NG/L (ref 0–20)
CHLORIDE SERPL-SCNC: 100 MMOL/L (ref 98–107)
CO2 SERPL-SCNC: 26 MMOL/L (ref 21–32)
CREAT SERPL-MCNC: 1.39 MG/DL (ref 0.5–1.3)
EGFRCR SERPLBLD CKD-EPI 2021: 57 ML/MIN/1.73M*2
EOSINOPHIL # BLD AUTO: 0.17 X10*3/UL (ref 0–0.7)
EOSINOPHIL NFR BLD AUTO: 2 %
ERYTHROCYTE [DISTWIDTH] IN BLOOD BY AUTOMATED COUNT: 13.2 % (ref 11.5–14.5)
GLUCOSE SERPL-MCNC: 94 MG/DL (ref 74–99)
HCT VFR BLD AUTO: 27.5 % (ref 41–52)
HGB BLD-MCNC: 9 G/DL (ref 13.5–17.5)
IMM GRANULOCYTES # BLD AUTO: 0.03 X10*3/UL (ref 0–0.7)
IMM GRANULOCYTES NFR BLD AUTO: 0.4 % (ref 0–0.9)
LYMPHOCYTES # BLD AUTO: 1.13 X10*3/UL (ref 1.2–4.8)
LYMPHOCYTES NFR BLD AUTO: 13.3 %
MAGNESIUM SERPL-MCNC: 1.7 MG/DL (ref 1.6–2.4)
MCH RBC QN AUTO: 29.6 PG (ref 26–34)
MCHC RBC AUTO-ENTMCNC: 32.7 G/DL (ref 32–36)
MCV RBC AUTO: 91 FL (ref 80–100)
MONOCYTES # BLD AUTO: 0.78 X10*3/UL (ref 0.1–1)
MONOCYTES NFR BLD AUTO: 9.2 %
NEUTROPHILS # BLD AUTO: 6.31 X10*3/UL (ref 1.2–7.7)
NEUTROPHILS NFR BLD AUTO: 74.5 %
NRBC BLD-RTO: 0 /100 WBCS (ref 0–0)
PLATELET # BLD AUTO: 197 X10*3/UL (ref 150–450)
POTASSIUM SERPL-SCNC: 4.3 MMOL/L (ref 3.5–5.3)
RBC # BLD AUTO: 3.04 X10*6/UL (ref 4.5–5.9)
SODIUM SERPL-SCNC: 133 MMOL/L (ref 136–145)
WBC # BLD AUTO: 8.5 X10*3/UL (ref 4.4–11.3)

## 2024-08-22 PROCEDURE — 51798 US URINE CAPACITY MEASURE: CPT

## 2024-08-22 PROCEDURE — G0152 HHCP-SERV OF OT,EA 15 MIN: HCPCS

## 2024-08-22 PROCEDURE — 82374 ASSAY BLOOD CARBON DIOXIDE: CPT | Performed by: STUDENT IN AN ORGANIZED HEALTH CARE EDUCATION/TRAINING PROGRAM

## 2024-08-22 PROCEDURE — 36415 COLL VENOUS BLD VENIPUNCTURE: CPT | Performed by: STUDENT IN AN ORGANIZED HEALTH CARE EDUCATION/TRAINING PROGRAM

## 2024-08-22 PROCEDURE — 84484 ASSAY OF TROPONIN QUANT: CPT | Performed by: STUDENT IN AN ORGANIZED HEALTH CARE EDUCATION/TRAINING PROGRAM

## 2024-08-22 PROCEDURE — 99283 EMERGENCY DEPT VISIT LOW MDM: CPT

## 2024-08-22 PROCEDURE — 2500000004 HC RX 250 GENERAL PHARMACY W/ HCPCS (ALT 636 FOR OP/ED): Performed by: STUDENT IN AN ORGANIZED HEALTH CARE EDUCATION/TRAINING PROGRAM

## 2024-08-22 PROCEDURE — 85025 COMPLETE CBC W/AUTO DIFF WBC: CPT | Performed by: STUDENT IN AN ORGANIZED HEALTH CARE EDUCATION/TRAINING PROGRAM

## 2024-08-22 PROCEDURE — 83735 ASSAY OF MAGNESIUM: CPT | Performed by: STUDENT IN AN ORGANIZED HEALTH CARE EDUCATION/TRAINING PROGRAM

## 2024-08-22 RX ADMIN — SODIUM CHLORIDE 1000 ML: 9 INJECTION, SOLUTION INTRAVENOUS at 17:03

## 2024-08-22 SDOH — ECONOMIC STABILITY: HOUSING INSECURITY
HOME SAFETY: D, HTN, INTRAVENTRICULAR HEMORRHAGE, LIVER DISEASE, PRE-DIABETES, SLEEP APNEA (CPAP).   PLOF: PATIENT LIVES ALONE, WORKING FULL TIME RUNNING A BUSINESS, 2SH WITH 1ST FLOOR SET UP, 2STE WITH BILATERAL HR. PATIENT HAS A CAREGIVER WHO COMPLETES GROCERY

## 2024-08-22 SDOH — ECONOMIC STABILITY: HOUSING INSECURITY
HOME SAFETY: 2W2  PATIENT AND CAREGIVER IN AGREEMENT WITH PLAN OF CARE AND VISIT FREQUENCY.    DISCIPLINE COMMUNICATION: MD, PT   PLAN FOR NEXT VISIT: ADL RETRAINING , AE INSTRUCTION, HIP PRECAUTIONS

## 2024-08-22 SDOH — ECONOMIC STABILITY: HOUSING INSECURITY

## 2024-08-22 SDOH — ECONOMIC STABILITY: HOUSING INSECURITY
HOME SAFETY: BATHROOM SAFETY AND RECOMMENDED REMOVAL OF THROW RUGS  PATIENT AND CAREGIVER RESPONSE TO INSTRUCTION: VERBALIZE UNDERSTANDING, RECEPTIVE TO SAFETY RECOMMENDATIONS   PATIENT AND CAREGIVER INSTRUCTED ON PLAN OF CARE AND VISIT FREQUENCY. OT TO TX 1W1,

## 2024-08-22 SDOH — ECONOMIC STABILITY: HOUSING INSECURITY
HOME SAFETY: TRANSFER TRAINING, DME INSTRUCTION AND EDUCATION, AE INSTRUCTION AND EDUCATION, HOME SAFETY AND FALL PREVENTION, THER EX  INSTRUCTION PROVIDED: SIGNS AND SYMPTOMS TO REPORT TO MD, BANDAGE AND INCISION CARE WITH ADLS, HIP PRECAUTIONS, AE FOR LB ADLS,

## 2024-08-22 SDOH — ECONOMIC STABILITY: HOUSING INSECURITY
HOME SAFETY: SHOPPING, LAUNDRY, SCHEDULES MEDICAL APPOINTMENTS AND SOMEONE WHO CLEANS HIS HOME. PATIENT WAS INDEP ADLS, TRANSFERS AND USING QUAD CANE FOR MOBILITY.   PRIMARY REASON FOR HOME CARE: PER CAREGIVER, PATIENT HAS HAD MEMORY DIFFICULTIES ALL OF HIS LIFE,

## 2024-08-22 SDOH — ECONOMIC STABILITY: HOUSING INSECURITY
HOME SAFETY: HOWEVER, A MORE SIGNIFCANT DECLINE OVER THE PAST 6+ MONTHS. PATIENT NOW HAS PRIVATE PAY NURSING CARE FOR AT LEAST 1 WEEK POST OP TO ENSURE SAFETY AT HOME. PATIENT REQUIRES VERBAL CUES FOR PROPER TECHNIQUES TO MAINTAIN HIP PREC WITH ADLS, TRANSFERS A

## 2024-08-22 ASSESSMENT — ENCOUNTER SYMPTOMS
PAIN LOCATION - PAIN FREQUENCY: INTERMITTENT
DESCRIPTION OF MEMORY LOSS: SHORT TERM
PAIN LOCATION - PAIN SEVERITY: 5/10
PAIN LOCATION - PAIN QUALITY: ACHING
FORGETFULNESS: 1
PAIN SEVERITY GOAL: 0/10
PAIN: 1
PERSON REPORTING PAIN: PATIENT
PAIN LOCATION - PAIN DURATION: SINCE SURGERY
LOWEST PAIN SEVERITY IN PAST 24 HOURS: 3/10
PAIN LOCATION: RIGHT HIP
SUBJECTIVE PAIN PROGRESSION: GRADUALLY IMPROVING
PAIN LOCATION - EXACERBATING FACTORS: STANDING, WALKING
HIGHEST PAIN SEVERITY IN PAST 24 HOURS: 5/10

## 2024-08-22 ASSESSMENT — ACTIVITIES OF DAILY LIVING (ADL)
PHYSICAL TRANSFERS ASSESSED: 1
CURRENT_FUNCTION: MINIMUM ASSIST
AMBULATION ASSISTANCE: 1
GROOMING ASSESSED: 1
GROOMING_CURRENT_FUNCTION: MINIMUM ASSIST
DRESSING_UB_CURRENT_FUNCTION: CONTACT GUARD ASSIST
DRESSING_LB_CURRENT_FUNCTION: MODERATE ASSIST
FEEDING_WITHIN_DEFINED_LIMITS: 1
OASIS_M1830: 05
TOILETING: 1
SPONGING_LB_CURRENT_FUNCTION: MODERATE ASSIST
AMBULATION ASSISTANCE: MINIMUM ASSIST
SPONGING_UB_CURRENT_FUNCTION: STAND BY ASSIST
TOILETING: MINIMUM ASSIST
ENTERING_EXITING_HOME: MINIMUM ASSIST

## 2024-08-22 ASSESSMENT — PAIN DESCRIPTION - LOCATION: LOCATION: HIP

## 2024-08-22 ASSESSMENT — PAIN SCALES - GENERAL
PAINLEVEL_OUTOF10: 0 - NO PAIN
PAINLEVEL_OUTOF10: 8

## 2024-08-22 ASSESSMENT — PAIN DESCRIPTION - ORIENTATION: ORIENTATION: RIGHT

## 2024-08-22 ASSESSMENT — PAIN DESCRIPTION - PAIN TYPE: TYPE: ACUTE PAIN

## 2024-08-22 NOTE — TELEPHONE ENCOUNTER
Spoke with patient's caregiver during follow-up phone call.  Caregiver indicates that patient is doing well and pain is under control.  Patient denies questions related to discharge instructions or homegoing medications.  Patient is aware of follow up visit with surgeon's office.  Home Care has established a first visit with patient.   Caregiver denies addtional questions or concerns at this time and verbalizes understanding to call RN Navigator or Surgeon's office with any new or worsening symptoms.

## 2024-08-22 NOTE — DISCHARGE INSTRUCTIONS
You have been seen at a [facility].  Please follow-up with your primary care provider in the next 1 to 2 days for further evaluation and routine follow-up.  Please return to the emergency room if having any worsening symptoms.  Please follow-up with any specialists if discussed during your emergency room stay.  Please hold off on taking your ramipril in the morning unless your systolic blood pressure is greater than 140.  Please call and follow-up with your primary care provider to make additional adjustments to your blood pressure medication.

## 2024-08-22 NOTE — ED PROVIDER NOTES
EMERGENCY MEDICINE EVALUATION NOTE    History of Present Illness     Chief Complaint:   Chief Complaint   Patient presents with    Hypotension       HPI: Otto Yen is a 64 y.o. male with past medical history of alcohol abuse, anemia, CKD, recent BILLIE who presents with complaint of hypotension.  Patient is here with 2 caretakers which 1 is a nurse who was concerned due to worsening hypotension.  She does state that she gave the patient his morning antihypertensives and noted shortly afterwards he started to have some worsening hypotension with systolics initially in the 110s of this did decrease into the 90s which brought him in for evaluation.  He does admit some mild intermittent lightheadedness although denies any currently.  He states he has no other current complaints at this chest pain no shortness of breath, fever, chills.  He did have a recent right total hip arthroplasty on 8/20/2024.  He denies any significant pain overlying the incisional area.  Denies any active bleeding.    Previous History     Past Medical History:   Diagnosis Date    Alcohol abuse, in remission     Quit 12/2023    Anemia 06/10/2024    H/H 11.9/35.7    Arthritis     Cataract     Chronic kidney disease     GFR-55, Creat-1.43, BUN-36 6/10/24    Eczema     HLD (hyperlipidemia)     HTN (hypertension)     Hydrocephalus (Multi)     as child, shunt placed at age 1    Hypertension     Intraventricular hemorrhage (Multi)     spontaneous, craniotomy at age 29 for SDH evacuation    Irritable bowel syndrome     Liver disease     Liver enzymes normalized after stopped drinking alcohol 2023    Pre-diabetes     on metformin, A1C= 5.9% on 9/22/22    Reactive airway disease (HHS-HCC)     no current inhaler use or need, used inhaler once 2/2 dust allergy    Shuffling gait     recent increase; seeing neurosurgery 8/5/24 for 2nd opinion    Sleep apnea     uses CPAP nightly, ? setting    Squamous cell skin cancer     head     Past Surgical History:    Procedure Laterality Date    CRANIOTOMY      SDH evacuation at age 29    CYSTOSCOPY  2011    KNEE Left 1972    patella removed    VENTRICULOPERITONEAL SHUNT Right 1961    age 1    VENTRICULOPERITONEAL SHUNT Right 1989    Revision    VENTRICULOPERITONEAL SHUNT Right 2006    Revision     Social History     Tobacco Use    Smoking status: Never    Smokeless tobacco: Never   Vaping Use    Vaping status: Never Used   Substance Use Topics    Alcohol use: Not Currently     Comment: quit 12/2023, prevous 1 fifth scotch QD    Drug use: Not Currently     No family history on file.  Allergies   Allergen Reactions    Penicillin Hives     as a child-     Current Outpatient Medications   Medication Instructions    aspirin 81 mg, oral, 2 times daily    atorvastatin (LIPITOR) 40 mg, oral, Daily    cefadroxil (DURICEF) 500 mg, oral, 2 times daily    cholecalciferol, vitD3,/vit K2 (VITAMIN D3-VITAMIN K2 ORAL) 1 tablet, oral, Daily    cyanocobalamin (VITAMIN B-12) 1,000 mcg, intramuscular, Every 30 days    diclofenac sodium (Voltaren) 1 % gel APPLY 2 GRAMS TOPICALLY TO THE AFFECTED AREA UP TO FOUR TIMES DAILY    docusate sodium (COLACE) 100 mg, oral, 2 times daily    gabapentin (NEURONTIN) 600 mg, oral, Nightly    glucosamine-chondroitin 500-400 mg tablet 1 tablet, oral, Daily    ketoconazole (NIZOral) 2 % cream APPLY ONCE DAILY TO RASH ON FACE AND EARS UNTIL CLEAR. REPEAT AS NEEDED FOR FLARES.    ketoconazole (NIZOral) 2 % shampoo PLEASE SEE ATTACHED FOR DETAILED DIRECTIONS    lidocaine (Lidoderm) 5 % patch APPLY 1 PATCH TWICE A DAY BY TOPICAL ROUTE AS DIRECTED FOR 30 DAYS.    metFORMIN XR (GLUCOPHAGE-XR) 500 mg, oral, Daily, Do not crush, chew, or split.    metoprolol succinate XL (TOPROL-XL) 100 mg, oral, Daily, Do not crush or chew.    multivitamin tablet 1 tablet, oral, Daily    ondansetron ODT (ZOFRAN-ODT) 4 mg, oral, Every 8 hours PRN    oxyCODONE (ROXICODONE) 5-10 mg, oral, Every 6 hours PRN    Pain Relief ES (acetaminophen)  1,000 mg, oral, Every 8 hours    pantoprazole (PROTONIX) 40 mg, oral, Daily before breakfast, Do not crush, chew, or split.    ramipril (ALTACE) 10 mg, oral, Daily    senna 8.6 mg, oral, Daily    thiamine (Vitamin B-1) 500 mg tablet oral    traMADol (ULTRAM) 50 mg, oral, Every 6 hours PRN    verapamil ER (VERALAN PM) 240 mg, oral, Nightly, Do not crush or chew.       Physical Exam     Appearance: Alert, oriented , cooperative,  in acute distress. Well nourished & well hydrated.     Skin: Intact,  dry skin, no lesions, rash, petechiae or purpura.      Eyes: PERRLA, EOMs intact,  Conjunctiva pink with no redness or exudates. Cornea & anterior chamber are clear, Eyelids without lesions. No scleral icterus.      ENT: Hearing grossly intact. External auditory canals patent, tympanic membranes intact with visible landmarks. Nares patent, mucus membranes moist. Dentition without lesions. Pharynx clear, uvula midline.      Neck: Supple, without meningismus. Thyroid not palpable. Trachea at midline. No lymphadenopathy.     Pulmonary: Clear bilaterally with good chest wall excursion. No rales, rhonchi or wheezing. No accessory muscle use or stridor.     Cardiac: Normal S1, S2 without murmur, rub, gallop or extrasystole. No JVD, Carotids without bruits.     Abdomen: Soft, nontender, active bowel sounds.  No palpable organomegaly.  No rebound or guarding.  No CVA tenderness.     Genitourinary: Exam deferred.     Musculoskeletal: Full range of motion. no edema, or deformity. Pulses full and equal. No cyanosis or clubbing.  Well-healed surgical incision site on the right hip with no surrounding erythema or active hemorrhage.  No obvious hematoma.     Neurological:  Cranial nerves II through XII are grossly intact, finger-nose touch is normal, normal sensation, no weakness, no focal findings identified.     Psychiatric: Appropriate mood and affect.      Results     Labs Reviewed   BASIC METABOLIC PANEL - Abnormal       Result  Value    Glucose 94      Sodium 133 (*)     Potassium 4.3      Chloride 100      Bicarbonate 26      Anion Gap 11      Urea Nitrogen 35 (*)     Creatinine 1.39 (*)     eGFR 57 (*)     Calcium 8.4 (*)    CBC WITH AUTO DIFFERENTIAL - Abnormal    WBC 8.5      nRBC 0.0      RBC 3.04 (*)     Hemoglobin 9.0 (*)     Hematocrit 27.5 (*)     MCV 91      MCH 29.6      MCHC 32.7      RDW 13.2      Platelets 197      Neutrophils % 74.5      Immature Granulocytes %, Automated 0.4      Lymphocytes % 13.3      Monocytes % 9.2      Eosinophils % 2.0      Basophils % 0.6      Neutrophils Absolute 6.31      Immature Granulocytes Absolute, Automated 0.03      Lymphocytes Absolute 1.13 (*)     Monocytes Absolute 0.78      Eosinophils Absolute 0.17      Basophils Absolute 0.05     MAGNESIUM - Normal    Magnesium 1.70     SERIAL TROPONIN-INITIAL - Normal    Troponin I, High Sensitivity 7      Narrative:     Less than 99th percentile of normal range cutoff-  Female and children under 18 years old <14 ng/L; Male <21 ng/L: Negative  Repeat testing should be performed if clinically indicated.     Female and children under 18 years old 14-50 ng/L; Male 21-50 ng/L:  Consistent with possible cardiac damage and possible increased clinical   risk. Serial measurements may help to assess extent of myocardial damage.     >50 ng/L: Consistent with cardiac damage, increased clinical risk and  myocardial infarction. Serial measurements may help assess extent of   myocardial damage.      NOTE: Children less than 1 year old may have higher baseline troponin   levels and results should be interpreted in conjunction with the overall   clinical context.     NOTE: Troponin I testing is performed using a different   testing methodology at [facility] than at other   [facility] hospitals. Direct result comparisons should only   be made within the same method.   TROPONIN SERIES- (INITIAL, 1 HR)    Narrative:     The following orders were created for  panel order Troponin I Series, High Sensitivity (0, 1 HR).  Procedure                               Abnormality         Status                     ---------                               -----------         ------                     Troponin I, High Sensiti...[[id number]]  Normal              Final result                 Please view results for these tests on the individual orders.   LACTATE     No orders to display         ED Course & Medical Decision Making     Medications   sodium chloride 0.9 % bolus 1,000 mL (0 mL intravenous Stopped 8/22/24 1733)     Diagnoses as of 08/22/24 1925   Hypotension, unspecified hypotension type     Heart Rate:  [52-80]   Temperature:  [36.9 °C (98.4 °F)-37.2 °C (99 °F)]   Respirations:  [18]   BP: ()/(48-70)   Weight:  [90.7 kg (200 lb)]   Pulse Ox:  [90 %-100 %]      Patient was initially hypotensive with a BP of 95/48 and mildly bradycardic with a heart rate of 52.  Otherwise afebrile and saturating 95% on room air.  No evidence of sepsis.  He does have a well-healed surgical incision to the right hip.  Orthopedic surgery team also did evaluate the patient while he was here in the emergency room and agree that there is no issues in regards to the total hip arthroplasty.  I did consider cardiogenic cause of his hypotension although likely this is related to antihypertensive management with taking his morning medications.  He was treated with 1 L normal saline for fluid resuscitation did have significant proved and was blood pressure was likely 120s as well as symptomatic improvement.  Baseline renal function noted on lab work.  Mild hyponatremia 133.  Troponin was negative.  EKG reassuring.  Stable anemia with hemoglobin 9.0 near his baseline without active hemorrhage.  No leukocytosis.  Patient remained stable and was observed in the emergency room for a 3-hour duration.  He was informed these findings.  He was informed to follow-up with his primary care writer for  additional medication adjustment although was informed to stop taking his ramipril in the morning unless his blood pressure and systolic is greater than 140 and to continue his other current antihypertensive regimen's although ultimately to follow-up with his PCP for medication management.    Procedures   Procedures    Diagnosis     1. Hypotension, unspecified hypotension type        Disposition     DISCHARGE.  The patient is discharged back to their place of residence.  Discharge diagnosis, instructions and plan were discussed and understood. At the time of discharge the patient was comfortable and was in no apparent distress. Patient is aware of diagnostic uncertainty and was notified though testing is negative here, there is a very small chance that pathology may be missed.  The patient understands these risks and the patient /family understood to return immediately to the emergency department if the symptoms worsen or if they have any additional concerns.    FOLLOW UP  Primary care provider in 1-2 days.        ED Prescriptions    None            Eyal Laird DO  08/22/24 1925

## 2024-08-23 ENCOUNTER — HOME CARE VISIT (OUTPATIENT)
Dept: HOME HEALTH SERVICES | Facility: HOME HEALTH | Age: 64
End: 2024-08-23
Payer: COMMERCIAL

## 2024-08-23 VITALS
SYSTOLIC BLOOD PRESSURE: 112 MMHG | HEART RATE: 63 BPM | TEMPERATURE: 97.7 F | DIASTOLIC BLOOD PRESSURE: 60 MMHG | RESPIRATION RATE: 14 BRPM | OXYGEN SATURATION: 96 %

## 2024-08-23 PROCEDURE — G0151 HHCP-SERV OF PT,EA 15 MIN: HCPCS

## 2024-08-23 ASSESSMENT — ENCOUNTER SYMPTOMS
PAIN: 1
PAIN LOCATION: RIGHT HIP
HIGHEST PAIN SEVERITY IN PAST 24 HOURS: 3/10
PAIN LOCATION - RELIEVING FACTORS: REST, ICE
PAIN LOCATION - PAIN SEVERITY: 2/10
PERSON REPORTING PAIN: PATIENT
PAIN LOCATION - EXACERBATING FACTORS: MOVEMENT

## 2024-08-23 ASSESSMENT — ACTIVITIES OF DAILY LIVING (ADL): AMBULATION ASSISTANCE ON FLAT SURFACES: 1

## 2024-08-26 ENCOUNTER — HOME CARE VISIT (OUTPATIENT)
Dept: HOME HEALTH SERVICES | Facility: HOME HEALTH | Age: 64
End: 2024-08-26
Payer: COMMERCIAL

## 2024-08-26 ENCOUNTER — APPOINTMENT (OUTPATIENT)
Dept: UROLOGY | Facility: CLINIC | Age: 64
End: 2024-08-26
Payer: COMMERCIAL

## 2024-08-26 VITALS — BODY MASS INDEX: 26.95 KG/M2 | WEIGHT: 199 LBS | HEIGHT: 72 IN

## 2024-08-26 DIAGNOSIS — R35.1 NOCTURIA: Primary | ICD-10-CM

## 2024-08-26 DIAGNOSIS — N50.89 SCROTAL SWELLING: ICD-10-CM

## 2024-08-26 PROCEDURE — 99204 OFFICE O/P NEW MOD 45 MIN: CPT | Performed by: STUDENT IN AN ORGANIZED HEALTH CARE EDUCATION/TRAINING PROGRAM

## 2024-08-26 PROCEDURE — 1036F TOBACCO NON-USER: CPT | Performed by: STUDENT IN AN ORGANIZED HEALTH CARE EDUCATION/TRAINING PROGRAM

## 2024-08-26 PROCEDURE — G0157 HHC PT ASSISTANT EA 15: HCPCS | Mod: CQ

## 2024-08-26 PROCEDURE — 3008F BODY MASS INDEX DOCD: CPT | Performed by: STUDENT IN AN ORGANIZED HEALTH CARE EDUCATION/TRAINING PROGRAM

## 2024-08-26 ASSESSMENT — ENCOUNTER SYMPTOMS
HIGHEST PAIN SEVERITY IN PAST 24 HOURS: 5/10
PAIN: 1
PAIN SEVERITY GOAL: 0/10
PAIN LOCATION: RIGHT HIP
PERSON REPORTING PAIN: PATIENT
LOWEST PAIN SEVERITY IN PAST 24 HOURS: 3/10

## 2024-08-26 NOTE — PROGRESS NOTES
Subjective   Patient ID: Otto Yen is a 64 y.o. male    HPI  64 y.o. male who presents to the clinic today for hydrocele and dysuria after hip surgery on 08/20/2024. No Hematuria. Some Dysuria at the start  of urination.   Frequency and Urgency.  No HX of Prostate Cancer. No recent PSA.  No HX of UTI's.  No Hx of Renal Stones.      Review of Systems    All systems were reviewed. Anything negative was noted in the HPI.    Objective   Physical Exam    General: Well developed, well nourished, alert and cooperative, appears in no acute distress   Eyes: Non-injected conjunctiva, sclera clear, no proptosis   Cardiac: Extremities are warm and well perfused. No edema, cyanosis or pallor   Lungs: Breathing is easy, non-labored. Speaking in clear and complete sentences. Normal diaphragmatic movement   MSK: Ambulatory with steady gait, unassisted   Neuro: Alert and oriented to person, place, and time   Psych: Demonstrates good judgment and reason, without hallucinations, abnormal affect or abnormal behaviors   Skin: No obvious lesions, no rashes       No CVA tenderness bilaterally   No suprapubic pain or discomfort       Past Medical History:   Diagnosis Date    Alcohol abuse, in remission     Quit 12/2023    Anemia 06/10/2024    H/H 11.9/35.7    Arthritis     Cataract     Chronic kidney disease     GFR-55, Creat-1.43, BUN-36 6/10/24    Eczema     HLD (hyperlipidemia)     HTN (hypertension)     Hydrocephalus (Multi)     as child, shunt placed at age 1    Hypertension     Intraventricular hemorrhage (Multi)     spontaneous, craniotomy at age 29 for SDH evacuation    Irritable bowel syndrome     Liver disease     Liver enzymes normalized after stopped drinking alcohol 2023    Pre-diabetes     on metformin, A1C= 5.9% on 9/22/22    Reactive airway disease (HHS-HCC)     no current inhaler use or need, used inhaler once 2/2 dust allergy    Shuffling gait     recent increase; seeing neurosurgery 8/5/24 for 2nd opinion    Sleep  apnea     uses CPAP nightly, ? setting    Squamous cell skin cancer     head         Past Surgical History:   Procedure Laterality Date    CRANIOTOMY      SDH evacuation at age 29    CYSTOSCOPY  2011    KNEE Left 1972    patella removed    VENTRICULOPERITONEAL SHUNT Right 1961    age 1    VENTRICULOPERITONEAL SHUNT Right 1989    Revision    VENTRICULOPERITONEAL SHUNT Right 2006    Revision           Assessment/Plan   Mild Hydrocele, LUTS    64 y.o. male who presents for the above condition, We had a very long and extensive discussion with the patient regarding the pathophysiology, differential diagnosis, risk factor, management, natural history, incidence and diagnostic work-up of the condition.   We had a very long and extensive discussion with the patient regarding the pathophysiology, differential diagnosis, risk factor, management, natural history, incidence and diagnostic work-up of the condition.  We discussed at length option of management including hydrocelectomy.  Discussed the risk, benefits, potential complications adverse events.We had a long and excess discussion with the patient regarding pathophysiology, differential diagnosis, risk factor, sick condition and management of hydrocele. Explained to him that the only permanent solution for hydrocele is to proceed with a surgical hydrocelectomy. I discussed the step-by-step details of hydrocelectomy. We also went at length into the discussion of the risk, benefit, potential complication, adverse events including but not limited to infection, bleeding, pain, edema and swelling, injury to the surrounding structures. Patient was understanding like to proceed with observation.    We will initiiate Tamsulosin 0.4 mg daily. Side effects of the medication were discussed with the patient including dizziness, drowsiness, weakness, nausea, diarrhea, headache, retrograde ejaculation, back pain, and runny nose. Patient understands risks and wishes to proceed.      Supportive care: Advise the patient to wear a jock strap or tight underwear for the next six weeks to support the scrotum and reduce inflammation.           Plan:  - Follow up with scrotal ultrasound and repeat PSA.         8/26/2024    Scribe Attestation  By signing my name below, IEmma Scribe   attest that this documentation has been prepared under the direction and in the presence of Dr. Jl Cruz

## 2024-08-27 ENCOUNTER — LAB (OUTPATIENT)
Dept: LAB | Facility: LAB | Age: 64
End: 2024-08-27
Payer: COMMERCIAL

## 2024-08-27 ENCOUNTER — HOME CARE VISIT (OUTPATIENT)
Dept: HOME HEALTH SERVICES | Facility: HOME HEALTH | Age: 64
End: 2024-08-27
Payer: COMMERCIAL

## 2024-08-27 VITALS
OXYGEN SATURATION: 99 % | TEMPERATURE: 98.9 F | SYSTOLIC BLOOD PRESSURE: 124 MMHG | DIASTOLIC BLOOD PRESSURE: 86 MMHG | RESPIRATION RATE: 18 BRPM | HEART RATE: 70 BPM

## 2024-08-27 DIAGNOSIS — R35.1 NOCTURIA: ICD-10-CM

## 2024-08-27 PROCEDURE — G0152 HHCP-SERV OF OT,EA 15 MIN: HCPCS

## 2024-08-27 PROCEDURE — 84153 ASSAY OF PSA TOTAL: CPT

## 2024-08-27 PROCEDURE — 36415 COLL VENOUS BLD VENIPUNCTURE: CPT

## 2024-08-27 PROCEDURE — 84154 ASSAY OF PSA FREE: CPT

## 2024-08-27 ASSESSMENT — ENCOUNTER SYMPTOMS
PAIN: 1
PAIN LOCATION: RIGHT HIP
SUBJECTIVE PAIN PROGRESSION: GRADUALLY IMPROVING
PAIN LOCATION - PAIN SEVERITY: 0/10
LOWEST PAIN SEVERITY IN PAST 24 HOURS: 0/10
HIGHEST PAIN SEVERITY IN PAST 24 HOURS: 2/10
PERSON REPORTING PAIN: PATIENT
PAIN SEVERITY GOAL: 0/10

## 2024-08-28 ENCOUNTER — HOSPITAL ENCOUNTER (OUTPATIENT)
Dept: RADIOLOGY | Facility: HOSPITAL | Age: 64
Discharge: HOME | End: 2024-08-28
Payer: COMMERCIAL

## 2024-08-28 ENCOUNTER — HOME CARE VISIT (OUTPATIENT)
Dept: HOME HEALTH SERVICES | Facility: HOME HEALTH | Age: 64
End: 2024-08-28
Payer: COMMERCIAL

## 2024-08-28 DIAGNOSIS — N50.89 SCROTAL SWELLING: ICD-10-CM

## 2024-08-28 PROCEDURE — 76870 US EXAM SCROTUM: CPT | Performed by: RADIOLOGY

## 2024-08-28 PROCEDURE — G0157 HHC PT ASSISTANT EA 15: HCPCS | Mod: CQ

## 2024-08-28 PROCEDURE — 93975 VASCULAR STUDY: CPT

## 2024-08-28 ASSESSMENT — ENCOUNTER SYMPTOMS
SUBJECTIVE PAIN PROGRESSION: GRADUALLY IMPROVING
PERSON REPORTING PAIN: DIRECT OBSERVATION
PAIN LOCATION - EXACERBATING FACTORS: MOVEMENT
PAIN: 1
PAIN LOCATION: RIGHT HIP

## 2024-08-28 ASSESSMENT — ACTIVITIES OF DAILY LIVING (ADL)
DRESSING_UB_CURRENT_FUNCTION: INDEPENDENT
PHYSICAL TRANSFERS ASSESSED: 1
TOILETING: 1
DRESSING_LB_CURRENT_FUNCTION: MINIMUM ASSIST
TOILETING: SUPERVISION
AMBULATION ASSISTANCE: 1
CURRENT_FUNCTION: SUPERVISION
AMBULATION ASSISTANCE: SUPERVISION

## 2024-08-29 ENCOUNTER — HOME CARE VISIT (OUTPATIENT)
Dept: HOME HEALTH SERVICES | Facility: HOME HEALTH | Age: 64
End: 2024-08-29
Payer: COMMERCIAL

## 2024-08-29 LAB
PSA FREE MFR SERPL: 26 %
PSA FREE SERPL-MCNC: 0.7 NG/ML
PSA SERPL IA-MCNC: 2.7 NG/ML (ref 0–4)

## 2024-08-29 PROCEDURE — G0152 HHCP-SERV OF OT,EA 15 MIN: HCPCS | Performed by: OCCUPATIONAL THERAPIST

## 2024-08-29 ASSESSMENT — ENCOUNTER SYMPTOMS: DENIES PAIN: 1

## 2024-08-30 ENCOUNTER — HOME CARE VISIT (OUTPATIENT)
Dept: HOME HEALTH SERVICES | Facility: HOME HEALTH | Age: 64
End: 2024-08-30
Payer: COMMERCIAL

## 2024-08-30 ENCOUNTER — OFFICE VISIT (OUTPATIENT)
Dept: ORTHOPEDIC SURGERY | Facility: CLINIC | Age: 64
End: 2024-08-30
Payer: COMMERCIAL

## 2024-08-30 DIAGNOSIS — Z96.641 STATUS POST RIGHT HIP REPLACEMENT: Primary | ICD-10-CM

## 2024-08-30 PROCEDURE — 99211 OFF/OP EST MAY X REQ PHY/QHP: CPT | Performed by: STUDENT IN AN ORGANIZED HEALTH CARE EDUCATION/TRAINING PROGRAM

## 2024-08-30 PROCEDURE — G0157 HHC PT ASSISTANT EA 15: HCPCS | Mod: CQ

## 2024-08-30 ASSESSMENT — ENCOUNTER SYMPTOMS
LOWEST PAIN SEVERITY IN PAST 24 HOURS: 2/10
PAIN SEVERITY GOAL: 0/10
PERSON REPORTING PAIN: PATIENT
PAIN LOCATION: RIGHT HIP
PAIN: 1
SUBJECTIVE PAIN PROGRESSION: GRADUALLY IMPROVING
HIGHEST PAIN SEVERITY IN PAST 24 HOURS: 3/10

## 2024-08-30 ASSESSMENT — PAIN - FUNCTIONAL ASSESSMENT: PAIN_FUNCTIONAL_ASSESSMENT: NO/DENIES PAIN

## 2024-08-30 NOTE — PROGRESS NOTES
Kiara Menchaca MD   Adult Reconstruction and Joint Replacement Surgery  Phone: [phone]     Fax: [phone]       Name: Otto Yen  Age: 64 y.o.   : 1960   Date of Visit: 2024    HIP REPLACEMENT POSTOPERATIVE VISIT    Date of Surgery: 24    Procedure: Right direct anterior total hip replacement  Diagnosis: R hip OA    Chief Complaint: Right hip replacement surgery follow-up    History of Present Illness:  The patient is now 10 days status post right direct anterior total hip replacement surgery.    The patient is doing home physical therapy and is progressing well.    Denies fevers or drainage from the incision.    Patient is walking with walker for assistive device.    The patient has no fevers or drainage from the incision.    The patient is currently taking tylenol, oxycodone, tramadol, and gabapentin for pain.     The patient is currently taking ASA 81mg for DVT prophylaxis.    There are no concerns.    Physical Exam:    The patient is well appearing, alert and oriented to person, place and time.    The incision is well healed.  There is no sign of wound complication.    There is no tenderness over the greater trochanter.    Range of motion is excellent and strength is improving.     There is no instability of the joint.    Homans sign is negative.    Neurologic and vascular examinations of the distal extremity are normal.     PROMs  Koos Jr-Knee Injury And Osteoarthritis Outcome Score For Joint Replacement    2024  8:59 PM EDT - Filed by Patient   Instructions    The following question concerns the amount of joint stiffness you have experienced during the last week in your knee. Stiffness is a sensation of restriction or slowness in the ease with which you move your knee joint.   How severe is your knee stiffness after first wakening in the morning? Mild   What amount of knee pain have you experienced the last week during the following activities?   Twisting/pivoting on  your knee Mild   Straightening knee fully Mild   Going up or down stairs Mild   Standing upright Mild   The following questions concern your physical function. By this we mean your ability to move around and to look after yourself. For each of the following activities please indicate the degree of difficulty you have experienced in the last week due to your knee.   Rising from sitting Moderate   Bending to floor/ an object Moderate   Koos Jr Scoring (range: 0 - 100) 63.78     Hoos Jr-Hip Disability And Osteoarthritis Outcome Score For Joint Replacement    8/19/2024  9:00 PM EDT - Filed by Patient   Instructions    What amount of hip pain have you experienced the last week during the following activities?   Going up or down stairs Moderate   Walking on an uneven surface Mild   The following questions concern your physical function. By this we mean your ability to move around and to look after yourself. For each of the following activities please indicate the degree of difficulty you have experienced in the last week due to your hip.   Rising from sitting Moderate   Bending to floor/ an object Moderate   Lying in bed (turning over, maintaining hip position) None   Sitting Mild   Hoos Jr Scoring (range: 0 - 100) 64.66     Ort [facility] 12 Item Health Survey (Vr-12)    7/25/2024 10:09 AM EDT - Filed by Jitendra Talavera RN   In general, would you say your health is: Good   The following questions are about activities you might do during a typical day. Does your health now limit you in these activities?  If so, how much?   Moderate activities, such as moving a table, pushing a vacuum , bowling, or playing golf? Yes, Limited A Little   Climbing several flights of stairs? Yes, Limited A Lot   During the past 4 weeks, have you had any of the following problems with your work or other regular daily activities as a result of your physical health?   Accomplished less than you would like. Yes, A Little Of  The Time   Were limited in the kind of work or other activities. Yes, A Little Of The Time   During the past 4 weeks, have you had any of the following problems with your work or other regular daily activities as a result of any emotional problems (such as feeling depressed or anxious)?   Accomplished less than you would like Yes, Some Of The Time   Didn't do work or other activities as carefully as usual Yes, Some Of The Time   During the past 4 weeks, how much did pain interfere with your normal work (including both work outside the home and house work)? Moderately   How much of the time during the past 4 weeks:   Have you felt calm and peaceful? A Good Bit Of The Time   Did you have a lot of energy? A Little Of The Time   Have you felt downhearted and blue? Some Of The Time   During the past 4 weeks, how much of the time has your physical health or emotional problems interfered with your social activities (like visiting with friends, relatives, etc.)? Some Of The Time   Compared to one year ago, how would you rate your physical health in general now? Slightly Worse   Compared to one year ago, how would you rate your emotional problems (such as feeling anxious, depressed or irritable) now? Slightly Worse   Ort Vr-12 Question Pcs (range: 0 - 100) 37.22   Ort Vr-12 Question McS (range: 0 - 100) 38.6           Imaging:    Radiographs were personally reviewed today. The implants are in good position with no evidence of complication. There have been no significant interval changes.    Impression and Plan:  This patient is 10 days status post right direct anterior total hip replacement surgery.     The patient is doing well following total hip replacement surgery.  Antibiotic prophylaxis prior to dental procedures were reviewed.  Hip precautions were reviewed.  Long-term failure mechanisms were reviewed.  The patient was asked to contact the office sooner if there are any concerns. A new physical therapy prescription  was given and exercises reviewed with the patient in the office. Their questions were answered.     RTC: 6 weeks      X-rays at next visit: Postop BILLIE series     _____________________  Kiara Menchaca MD   Attending Orthopaedic Surgeon  [facility]    [facility]    This office note was transcribed with dictation software.  Please excuse any typographical errors, program misunderstandings leading to inadvertent insertions or deletions of inappropriate wording, pronoun errors and other unintentional transcription errors not noticed on proof-reading.

## 2024-09-03 ENCOUNTER — HOME CARE VISIT (OUTPATIENT)
Dept: HOME HEALTH SERVICES | Facility: HOME HEALTH | Age: 64
End: 2024-09-03
Payer: COMMERCIAL

## 2024-09-03 VITALS — DIASTOLIC BLOOD PRESSURE: 82 MMHG | OXYGEN SATURATION: 100 % | HEART RATE: 77 BPM | SYSTOLIC BLOOD PRESSURE: 154 MMHG

## 2024-09-03 VITALS
OXYGEN SATURATION: 100 % | RESPIRATION RATE: 18 BRPM | DIASTOLIC BLOOD PRESSURE: 72 MMHG | HEART RATE: 68 BPM | SYSTOLIC BLOOD PRESSURE: 140 MMHG | TEMPERATURE: 98.2 F

## 2024-09-03 PROCEDURE — G0151 HHCP-SERV OF PT,EA 15 MIN: HCPCS

## 2024-09-03 PROCEDURE — G0152 HHCP-SERV OF OT,EA 15 MIN: HCPCS

## 2024-09-03 ASSESSMENT — ENCOUNTER SYMPTOMS
LOWEST PAIN SEVERITY IN PAST 24 HOURS: 2/10
PAIN LOCATION: RIGHT HIP
SUBJECTIVE PAIN PROGRESSION: RAPIDLY IMPROVING
DENIES PAIN: 1
PAIN LOCATION - PAIN SEVERITY: 2/10
PERSON REPORTING PAIN: PATIENT
PAIN: 1
HIGHEST PAIN SEVERITY IN PAST 24 HOURS: 3/10

## 2024-09-03 ASSESSMENT — ACTIVITIES OF DAILY LIVING (ADL)
OASIS_M1830: 02
HOME_HEALTH_OASIS: 01

## 2024-09-04 ENCOUNTER — OFFICE VISIT (OUTPATIENT)
Dept: UROLOGY | Facility: HOSPITAL | Age: 64
End: 2024-09-04
Payer: COMMERCIAL

## 2024-09-04 ENCOUNTER — EVALUATION (OUTPATIENT)
Dept: PHYSICAL THERAPY | Facility: CLINIC | Age: 64
End: 2024-09-04
Payer: COMMERCIAL

## 2024-09-04 DIAGNOSIS — N13.8 BPH WITH OBSTRUCTION/LOWER URINARY TRACT SYMPTOMS: Primary | ICD-10-CM

## 2024-09-04 DIAGNOSIS — M16.11 UNILATERAL PRIMARY OSTEOARTHRITIS, RIGHT HIP: ICD-10-CM

## 2024-09-04 DIAGNOSIS — N40.1 BPH WITH OBSTRUCTION/LOWER URINARY TRACT SYMPTOMS: Primary | ICD-10-CM

## 2024-09-04 PROCEDURE — 99214 OFFICE O/P EST MOD 30 MIN: CPT | Performed by: STUDENT IN AN ORGANIZED HEALTH CARE EDUCATION/TRAINING PROGRAM

## 2024-09-04 PROCEDURE — 97110 THERAPEUTIC EXERCISES: CPT | Mod: GP | Performed by: PHYSICAL THERAPIST

## 2024-09-04 PROCEDURE — 97161 PT EVAL LOW COMPLEX 20 MIN: CPT | Mod: GP | Performed by: PHYSICAL THERAPIST

## 2024-09-04 RX ORDER — TAMSULOSIN HYDROCHLORIDE 0.4 MG/1
0.4 CAPSULE ORAL DAILY
Qty: 30 CAPSULE | Refills: 11 | Status: SHIPPED | OUTPATIENT
Start: 2024-09-04 | End: 2025-09-04

## 2024-09-04 NOTE — PROGRESS NOTES
Physical Therapy Evaluation    Patient Name: Otto Yen  MRN: [id number]  Today's Date: 9/5/2024  Time Calculation  Start Time: 0855  Stop Time: 0930  Time Calculation (min): 35 min    Visit #: 1  Visits approved: 60 PT/OT/ST- 9 used previously.  Certification dates: NA    Precautions: Precautions  Precautions Comment: Per protocol. Hydrocephalus. Low fall risk.    Subjective/History    Other Measures  Lower Extremity Funtional Score (LEFS): 15    ** Patient accompanied by caregiver who plans to assist him with HEP.    DOI: 8/20/24.  Mechanism of injury: Right BILLIE (anterior).  Area of symptoms: Ache at lat right hip. Pain Assessment: 0-10  0-10 (Numeric) Pain Score: 0 - No pain  Aggravating factors: Walk few min. Stand ~10 min.  Don pants. Don socks.  Easing factors: Gabapentin. Ice.   Red flags: None. Sense of bladder urgency- being followed by urologist.  Occupation: Production control- walking.   Recreation/Hobbies/Sports: Would like to ride a bike- has not tried in 3 yrs.  Living situation: 2-story home- able to avoid stairs.  Barriers to treatment: Hydrocephalus with memory issues.  Preferred language: English    Objective Findings  Observation/gait: Arrived using quad cane correctly. Very slow gait, mild decr right > left hip ext. Appears slightly unsteady.  PROM HF: ~90- p1, guarding. HE: ~-15- guarding. Abd: ~15 deg- guarding.   Muscle activation/Resisted testing: Glute med: 4-/5 right. 4/5 left.   SLS: ~2 sec w/o UE contact- LOB. Left ~8 sec.     Treatment:   Therex: Otto stretch with right foot on bed/plinth- HEP 2 min, 4x/day. Standing march, hip ext (gentle), hip abd, mini squats- all to HEP 10x, 2x/day.     Gait: Practiced walking with front wheel walker. Demo'd correctly- steady. Instructed to use walker for all ambulation for now.     Assessment  Patient presents with gait deviations and decr ROM consistent with BILLIE.    Plan  Physical therapy 1-2 times/week for 8 wks. Therapy may include the  following: Therapeutic exercise, manual therapy, education/home program, gait training.     Goals: Walk 20 min for exer w/o pain. Stand ~20 min for meal prep.  Don pants independently. Don socks independently.

## 2024-09-04 NOTE — PROGRESS NOTES
Subjective   Patient ID: Otto Yen is a 64 y.o. male    HPI  64 y.o. male who presents to the clinic today for follow up after his ultrasound and PSA. Hx of hydrocele and dysuria after hip surgery on 08/20/2024. He is doing good today. His assistant who came with him today noted that he still has urinary frequency. He goes often throughout the day and is not on any medications for this.     PSA as of 8/27/24 is 2.7 ng/mL    The most recent scrotal ultrasound revealed:  Large, mildly complex right hydrocele.      Moderate to large left hydrocele.      4 mm right epididymal cyst.         Review of Systems    All systems were reviewed. Anything negative was noted in the HPI.    Objective   Physical Exam    General: Well developed, well nourished, alert and cooperative, appears in no acute distress   Eyes: Non-injected conjunctiva, sclera clear, no proptosis   Cardiac: Extremities are warm and well perfused. No edema, cyanosis or pallor   Lungs: Breathing is easy, non-labored. Speaking in clear and complete sentences. Normal diaphragmatic movement   MSK: Ambulatory with steady gait, unassisted   Neuro: Alert and oriented to person, place, and time   Psych: Demonstrates good judgment and reason, without hallucinations, abnormal affect or abnormal behaviors   Skin: No obvious lesions, no rashes       No CVA tenderness bilaterally   No suprapubic pain or discomfort   Mild right hydrocele palpated      Past Medical History:   Diagnosis Date    Alcohol abuse, in remission     Quit 12/2023    Anemia 06/10/2024    H/H 11.9/35.7    Arthritis     Cataract     Chronic kidney disease     GFR-55, Creat-1.43, BUN-36 6/10/24    Eczema     HLD (hyperlipidemia)     HTN (hypertension)     Hydrocephalus (Multi)     as child, shunt placed at age 1    Hypertension     Intraventricular hemorrhage (Multi)     spontaneous, craniotomy at age 29 for SDH evacuation    Irritable bowel syndrome     Liver disease     Liver enzymes normalized  after stopped drinking alcohol 2023    Pre-diabetes     on metformin, A1C= 5.9% on 9/22/22    Reactive airway disease (HHS-HCC)     no current inhaler use or need, used inhaler once 2/2 dust allergy    Shuffling gait     recent increase; seeing neurosurgery 8/5/24 for 2nd opinion    Sleep apnea     uses CPAP nightly, ? setting    Squamous cell skin cancer     head         Past Surgical History:   Procedure Laterality Date    CRANIOTOMY      SDH evacuation at age 29    CYSTOSCOPY  2011    KNEE Left 1972    patella removed    VENTRICULOPERITONEAL SHUNT Right 1961    age 1    VENTRICULOPERITONEAL SHUNT Right 1989    Revision    VENTRICULOPERITONEAL SHUNT Right 2006    Revision           Assessment/Plan   Mild Hydrocele, LUTS    64 y.o. male who presents for the above condition, We had a very long and extensive discussion with the patient regarding the pathophysiology, differential diagnosis, risk factor, management, natural history, incidence and diagnostic work-up of the condition.   We had a very long and extensive discussion with the patient regarding the pathophysiology, differential diagnosis, risk factor, management, natural history, incidence and diagnostic work-up of the condition.  We discussed at length option of management including hydrocelectomy.  Discussed the risk, benefits, potential complications adverse events.We had a long and excess discussion with the patient regarding pathophysiology, differential diagnosis, risk factor, sick condition and management of hydrocele. Explained to him that the only permanent solution for hydrocele is to proceed with a surgical hydrocelectomy. I discussed the step-by-step details of hydrocelectomy. We also went at length into the discussion of the risk, benefit, potential complication, adverse events including but not limited to infection, bleeding, pain, edema and swelling, injury to the surrounding structures. Patient was understanding like to proceed with  observation.    We will initiiate Tamsulosin 0.4 mg daily. Side effects of the medication were discussed with the patient including dizziness, drowsiness, weakness, nausea, diarrhea, headache, retrograde ejaculation, back pain, and runny nose. Patient understands risks and wishes to proceed.     Supportive care: Advise the patient to wear a jock strap or tight underwear for the next six weeks to support the scrotum and reduce inflammation. We also discussed cutting back on caffeine           Plan:  - Prescribed tamsulosin 0.4 mg  - PSA  - Follow up in 1 year with PSA prior        9/4/2024    Jennaibe Attestation  By signing my name below, IMonica Scribe   attest that this documentation has been prepared under the direction and in the presence of Jl Cruz MD MPH.

## 2024-09-05 ASSESSMENT — PAIN - FUNCTIONAL ASSESSMENT: PAIN_FUNCTIONAL_ASSESSMENT: 0-10

## 2024-09-05 ASSESSMENT — PAIN SCALES - GENERAL: PAINLEVEL_OUTOF10: 0 - NO PAIN

## 2024-09-10 ENCOUNTER — TREATMENT (OUTPATIENT)
Dept: PHYSICAL THERAPY | Facility: CLINIC | Age: 64
End: 2024-09-10
Payer: COMMERCIAL

## 2024-09-10 ENCOUNTER — APPOINTMENT (OUTPATIENT)
Dept: PHYSICAL THERAPY | Facility: CLINIC | Age: 64
End: 2024-09-10
Payer: COMMERCIAL

## 2024-09-10 DIAGNOSIS — M16.11 UNILATERAL PRIMARY OSTEOARTHRITIS, RIGHT HIP: Primary | ICD-10-CM

## 2024-09-10 PROCEDURE — 97110 THERAPEUTIC EXERCISES: CPT | Mod: GP | Performed by: PHYSICAL THERAPIST

## 2024-09-10 PROCEDURE — 97140 MANUAL THERAPY 1/> REGIONS: CPT | Mod: GP | Performed by: PHYSICAL THERAPIST

## 2024-09-10 NOTE — PROGRESS NOTES
Physical Therapy Follow-up    Patient Name: Otto Yen  MRN: [id number]  Today's Date: 9/10/2024  Time Calculation  Start Time: 1019  Stop Time: 1102  Time Calculation (min): 43 min      Visit#: 2. 50 approved.  Cert dates: NA    Precautions: Hydrocephalus. Low fall risk.    Subjective: 0/10 resting pain. Easier to walk. HEP going well.      Objective: Gait: Using FWW correctly- steady. Mild decr right hip ext in terminal stance.   PROM HF: ~95- stiff. HE: ~-5- stiff.     Treatment:   Therex: Modified Otto stretch with foot on bed, standing march, standing gentle hip ext, standing hip abd, mini squats- all demo'd correctly.     Manual: IV- hip flex, IR// PROM hip flex: ~105.     Gait: Practiced walking with quad cane- correct mechanics with reminders to place right foot and cane down together.     Plan: Incr LE stabilization exer, gait training.

## 2024-09-13 ENCOUNTER — TREATMENT (OUTPATIENT)
Dept: PHYSICAL THERAPY | Facility: CLINIC | Age: 64
End: 2024-09-13
Payer: COMMERCIAL

## 2024-09-13 DIAGNOSIS — M16.11 UNILATERAL PRIMARY OSTEOARTHRITIS, RIGHT HIP: Primary | ICD-10-CM

## 2024-09-13 PROCEDURE — 97140 MANUAL THERAPY 1/> REGIONS: CPT | Mod: GP | Performed by: PHYSICAL THERAPIST

## 2024-09-13 PROCEDURE — 97110 THERAPEUTIC EXERCISES: CPT | Mod: GP | Performed by: PHYSICAL THERAPIST

## 2024-09-13 NOTE — PROGRESS NOTES
Physical Therapy Follow-up    Patient Name: Otto Yen  MRN: [id number]  Today's Date: 9/13/2024  Time Calculation  Start Time: 0805  Stop Time: 0848  Time Calculation (min): 43 min      Visit#: 3. 50 approved.  Cert dates: NA    Precautions: Hydrocephalus. Low fall risk.    Subjective: 0/10 resting pain. Easier to walk. HEP going well.      Objective:   PROM HF: ~95- stiff. HE: ~-5- stiff.     Treatment:   Therex: Modified Otto stretch with foot on bed, standing gentle hip ext, standing hip abd, mini squats- all demo'd correctly.     Manual: IV- hip flex, IR, gentle PROM HE to neutral // PROM hip flex: ~105. HE: 0 deg.    Plan: Incr LE stabilization exer, gait training.

## 2024-09-17 ENCOUNTER — TREATMENT (OUTPATIENT)
Dept: PHYSICAL THERAPY | Facility: CLINIC | Age: 64
End: 2024-09-17
Payer: COMMERCIAL

## 2024-09-17 DIAGNOSIS — M16.11 UNILATERAL PRIMARY OSTEOARTHRITIS, RIGHT HIP: Primary | ICD-10-CM

## 2024-09-17 PROCEDURE — 97140 MANUAL THERAPY 1/> REGIONS: CPT | Mod: GP | Performed by: PHYSICAL THERAPIST

## 2024-09-17 PROCEDURE — 97110 THERAPEUTIC EXERCISES: CPT | Mod: GP | Performed by: PHYSICAL THERAPIST

## 2024-09-17 NOTE — PROGRESS NOTES
Physical Therapy Follow-up    Patient Name: Otto Yen  MRN: [id number]  Today's Date: 9/17/2024  Time Calculation  Start Time: 0318  Stop Time: 0400  Time Calculation (min): 42 min      Visit#: 4. 50 approved.  Cert dates: NA    Precautions: Hydrocephalus. Low fall risk.    Subjective: 0/10 resting pain. Easier to walk. HEP going well.      Objective:   PROM HF: ~95- stiff. HE: ~0- stiff.     Treatment:   Therex: Modified Otto stretch with foot on bed, standing gentle hip ext, standing hip abd, mini squats- all demo'd correctly.     Gait. Practiced walking with straight cane- demo'd correct use, CG x ~60'.     Manual: IV- hip flex, IR, gentle PROM HE to neutral // PROM hip flex: ~105. HE: ~5 deg.    Plan: Incr LE stabilization exer, gait training.

## 2024-09-20 ENCOUNTER — TREATMENT (OUTPATIENT)
Dept: PHYSICAL THERAPY | Facility: CLINIC | Age: 64
End: 2024-09-20
Payer: COMMERCIAL

## 2024-09-20 DIAGNOSIS — M16.11 UNILATERAL PRIMARY OSTEOARTHRITIS, RIGHT HIP: Primary | ICD-10-CM

## 2024-09-20 PROCEDURE — 97140 MANUAL THERAPY 1/> REGIONS: CPT | Mod: GP | Performed by: PHYSICAL THERAPIST

## 2024-09-20 PROCEDURE — 97110 THERAPEUTIC EXERCISES: CPT | Mod: GP | Performed by: PHYSICAL THERAPIST

## 2024-09-20 NOTE — PROGRESS NOTES
Physical Therapy Follow-up    Patient Name: Otto Yen  MRN: [id number]  Today's Date: 9/20/2024  Time Calculation  Start Time: 0334  Stop Time: 0416  Time Calculation (min): 42 min      Visit#: 4. 50 approved.  Cert dates: NA    Precautions: Hydrocephalus. Low fall risk.    Subjective: 5/10 resting left hip pain. 0/10 right hip pain. Easier to walk. HEP going well.      Objective:   PROM HF: ~95- stiff. HE: ~5- stiff.     Treatment:   Therex: Modified Otto stretch with foot on bed, standing gentle hip ext, standing hip abd, mini squats, standing march- all demo'd correctly.     SLS with min UE contact at counter-  HEP 30 sec, 2x/day.    Gait: Practiced walking with straight cane- demo'd correct use, CG x ~120'.  Instructed to continue with walker outside of house.     Manual: IV- hip flex, IR, gentle PROM HE to neutral // PROM hip flex: ~105. HE: ~5 deg.    Plan: Incr LE stabilization exer, gait training.

## 2024-09-24 ENCOUNTER — TREATMENT (OUTPATIENT)
Dept: PHYSICAL THERAPY | Facility: CLINIC | Age: 64
End: 2024-09-24
Payer: COMMERCIAL

## 2024-09-24 DIAGNOSIS — M16.11 UNILATERAL PRIMARY OSTEOARTHRITIS, RIGHT HIP: Primary | ICD-10-CM

## 2024-09-24 PROBLEM — M16.12 UNILATERAL PRIMARY OSTEOARTHRITIS, LEFT HIP: Status: ACTIVE | Noted: 2024-10-31

## 2024-09-24 PROCEDURE — 97110 THERAPEUTIC EXERCISES: CPT | Mod: GP | Performed by: PHYSICAL THERAPIST

## 2024-09-24 PROCEDURE — 97140 MANUAL THERAPY 1/> REGIONS: CPT | Mod: GP | Performed by: PHYSICAL THERAPIST

## 2024-09-24 NOTE — PROGRESS NOTES
Physical Therapy Follow-up    Patient Name: Otto Yen  MRN: [id number]  Today's Date: 9/24/2024  Time Calculation  Start Time: 0448  Stop Time: 0530  Time Calculation (min): 42 min      Visit#: 6. 50 approved.  Cert dates: NA    Precautions: Hydrocephalus. Low fall risk.    Subjective: 5/10 resting left hip pain. 0/10 right hip pain. Easier to walk. HEP going well.      Objective:   PROM HF: ~95- stiff. HE: ~5- stiff.     Treatment:   Therex: Modified Otto stretch with foot on bed, standing gentle hip ext, standing hip abd, mini squats, standing march, heel raises, SLS- all demo'd correctly.     Gait: Practiced walking with straight cane- demo'd correct use, CG x ~120'- steady.  Instructed to continue with walker for longer walks outside of house or if on uneven surfaces..     Manual: IV- hip flex, IR, gentle PROM HE to neutral // PROM hip flex: ~105. HE: ~10 deg.    Plan: Incr LE stabilization exer, gait training.

## 2024-09-25 DIAGNOSIS — N13.8 BPH WITH OBSTRUCTION/LOWER URINARY TRACT SYMPTOMS: ICD-10-CM

## 2024-09-25 DIAGNOSIS — N40.1 BPH WITH OBSTRUCTION/LOWER URINARY TRACT SYMPTOMS: ICD-10-CM

## 2024-09-27 ENCOUNTER — TREATMENT (OUTPATIENT)
Dept: PHYSICAL THERAPY | Facility: CLINIC | Age: 64
End: 2024-09-27
Payer: COMMERCIAL

## 2024-09-27 DIAGNOSIS — M16.11 UNILATERAL PRIMARY OSTEOARTHRITIS, RIGHT HIP: ICD-10-CM

## 2024-09-27 PROCEDURE — 97110 THERAPEUTIC EXERCISES: CPT | Mod: GP | Performed by: PHYSICAL THERAPIST

## 2024-09-27 PROCEDURE — 97140 MANUAL THERAPY 1/> REGIONS: CPT | Mod: GP | Performed by: PHYSICAL THERAPIST

## 2024-09-27 RX ORDER — TAMSULOSIN HYDROCHLORIDE 0.4 MG/1
0.4 CAPSULE ORAL DAILY
Qty: 90 CAPSULE | Refills: 3 | Status: SHIPPED | OUTPATIENT
Start: 2024-09-27 | End: 2025-09-22

## 2024-09-27 NOTE — PROGRESS NOTES
Physical Therapy Follow-up    Patient Name: Otto Yen  MRN: [id number]  Today's Date: 9/27/2024  Time Calculation  Start Time: 1135  Stop Time: 1220  Time Calculation (min): 45 min      Visit#: 7. 50 approved.  Cert dates: NA    Precautions: Hydrocephalus. Low fall risk.    Subjective: 5/10 resting left hip pain. 0/10 right hip pain. Easier to walk. HEP going well.      Objective:   PROM right HF: ~95- stiff. HE: ~5- stiff.     Treatment:   Therex: Modified Otto stretch with foot on bed, standing gentle hip ext, standing hip abd, mini squats, standing march, heel raises, SLS- all demo'd correctly.     Gait: Practiced walking with straight cane- demo'd correct use, CG x ~120'- steady.  Instructed to continue with walker for longer walks outside of house or if on uneven surfaces..     Manual: IV- hip flex, IR, gentle PROM HE to neutral // PROM hip flex: ~105. HE: ~10 deg.    Assessment: Incr ROM with rx. Gait improving. Limited by left >> right hip pain.    Plan: Incr LE stabilization exer, gait training.

## 2024-10-01 ENCOUNTER — TREATMENT (OUTPATIENT)
Dept: PHYSICAL THERAPY | Facility: CLINIC | Age: 64
End: 2024-10-01
Payer: COMMERCIAL

## 2024-10-01 DIAGNOSIS — M16.11 UNILATERAL PRIMARY OSTEOARTHRITIS, RIGHT HIP: Primary | ICD-10-CM

## 2024-10-01 PROCEDURE — 97140 MANUAL THERAPY 1/> REGIONS: CPT | Mod: GP | Performed by: PHYSICAL THERAPIST

## 2024-10-01 PROCEDURE — 97110 THERAPEUTIC EXERCISES: CPT | Mod: GP | Performed by: PHYSICAL THERAPIST

## 2024-10-01 PROCEDURE — 97116 GAIT TRAINING THERAPY: CPT | Mod: GP | Performed by: PHYSICAL THERAPIST

## 2024-10-01 NOTE — PROGRESS NOTES
Physical Therapy Follow-up    Patient Name: Otto Yen  MRN: [id number]  Today's Date: 10/1/2024  Time Calculation  Start Time: 0450  Stop Time: 0530  Time Calculation (min): 40 min      Visit#: 8. 50 approved.  Cert dates: NA    Precautions: Hydrocephalus. Low fall risk.    Subjective: 2/10 resting left hip pain. 0/10 right hip pain. Easier to walk. HEP going well.      Objective:   PROM right HF: ~95- stiff. HE: ~5- stiff.     Treatment:   Therex: Modified Otto stretch with foot on bed, standing gentle hip ext, standing hip abd, mini squats, standing march, SLS- all demo'd correctly.      Gait: Practiced walking with straight cane- demo'd correct use, CG  2 x ~120'- steady.  Instructed to continue with walker for longer walks outside of house or if on uneven surfaces. Practiced side stepping L/R and line walking with min UE contact at barre.     Manual: IV- hip flex, IR, gentle PROM HE to neutral // PROM hip flex: ~105. HE: ~10 deg.    Assessment: Incr ROM with rx. Gait improving.     Plan: Incr LE stabilization exer, gait training.

## 2024-10-04 ENCOUNTER — TREATMENT (OUTPATIENT)
Dept: PHYSICAL THERAPY | Facility: CLINIC | Age: 64
End: 2024-10-04
Payer: COMMERCIAL

## 2024-10-04 DIAGNOSIS — M16.11 UNILATERAL PRIMARY OSTEOARTHRITIS, RIGHT HIP: Primary | ICD-10-CM

## 2024-10-04 PROCEDURE — 97110 THERAPEUTIC EXERCISES: CPT | Mod: GP | Performed by: PHYSICAL THERAPIST

## 2024-10-04 PROCEDURE — 97140 MANUAL THERAPY 1/> REGIONS: CPT | Mod: GP | Performed by: PHYSICAL THERAPIST

## 2024-10-04 NOTE — PROGRESS NOTES
"Physical Therapy Follow-up    Patient Name: Otto Yen  MRN: [id number]  Today's Date: 10/4/2024  Time Calculation  Start Time: 0335  Stop Time: 0415  Time Calculation (min): 40 min      Visit#: 9. 50 approved.  Cert dates: NA    Precautions: Hydrocephalus. Low fall risk.    Subjective: 2/10 resting left hip pain. 0/10 right hip pain. Easier to walk. HEP going well.      Objective:   PROM right HF: ~100- stiff. HE: ~5- stiff.     Treatment:   Therex: Modified Otto stretch with foot on bed, standing gentle hip ext, standing hip abd, mini squats, standing march, SLS- all demo'd correctly.      Step-ups to 6\" step 1 x10.    Gait: Practiced walking with straight cane- demo'd correct use, CG  2 x ~120'- steady.  Instructed to continue with walker for longer walks outside of house or if on uneven surfaces. Practiced side stepping L/R and line walking with min UE contact at barre.     Manual: IV- hip flex, IR, gentle PROM HE to neutral // PROM hip flex: ~105. HE: ~10 deg.    Assessment: Incr ROM with rx. Gait improving.     Plan: Incr LE stabilization exer, gait training.       "

## 2024-10-10 ENCOUNTER — CLINICAL SUPPORT (OUTPATIENT)
Dept: PREADMISSION TESTING | Facility: HOSPITAL | Age: 64
End: 2024-10-10
Payer: COMMERCIAL

## 2024-10-10 DIAGNOSIS — M16.11 UNILATERAL PRIMARY OSTEOARTHRITIS, RIGHT HIP: ICD-10-CM

## 2024-10-10 RX ORDER — ACETAMINOPHEN 325 MG/1
325 TABLET ORAL EVERY 6 HOURS PRN
COMMUNITY

## 2024-10-10 RX ORDER — LANOLIN ALCOHOL/MO/W.PET/CERES
50 CREAM (GRAM) TOPICAL DAILY
COMMUNITY

## 2024-10-10 RX ORDER — FLUTICASONE PROPIONATE 50 MCG
1 SPRAY, SUSPENSION (ML) NASAL DAILY
COMMUNITY
Start: 2024-10-07

## 2024-10-10 RX ORDER — NAPROXEN SODIUM 220 MG/1
1 TABLET ORAL
COMMUNITY

## 2024-10-10 RX ORDER — TRIAMCINOLONE ACETONIDE 1 MG/G
1 OINTMENT TOPICAL DAILY
COMMUNITY
Start: 2024-09-25

## 2024-10-10 RX ORDER — TACROLIMUS 1 MG/G
1 OINTMENT TOPICAL AS NEEDED
COMMUNITY

## 2024-10-11 ENCOUNTER — APPOINTMENT (OUTPATIENT)
Dept: UROLOGY | Facility: CLINIC | Age: 64
End: 2024-10-11
Payer: COMMERCIAL

## 2024-10-11 DIAGNOSIS — N50.89 SCROTAL SWELLING: ICD-10-CM

## 2024-10-14 ENCOUNTER — HOSPITAL ENCOUNTER (OUTPATIENT)
Dept: RADIOLOGY | Facility: HOSPITAL | Age: 64
Discharge: HOME | End: 2024-10-14
Payer: COMMERCIAL

## 2024-10-14 ENCOUNTER — OFFICE VISIT (OUTPATIENT)
Dept: ORTHOPEDIC SURGERY | Facility: HOSPITAL | Age: 64
End: 2024-10-14
Payer: COMMERCIAL

## 2024-10-14 DIAGNOSIS — M16.12 PRIMARY OSTEOARTHRITIS OF LEFT HIP: ICD-10-CM

## 2024-10-14 DIAGNOSIS — M16.32 OSTEOARTHRITIS RESULTING FROM LEFT HIP DYSPLASIA: Primary | ICD-10-CM

## 2024-10-14 DIAGNOSIS — M25.859 FEMORAL ACETABULAR IMPINGEMENT: ICD-10-CM

## 2024-10-14 PROCEDURE — 99214 OFFICE O/P EST MOD 30 MIN: CPT | Mod: 57 | Performed by: STUDENT IN AN ORGANIZED HEALTH CARE EDUCATION/TRAINING PROGRAM

## 2024-10-14 PROCEDURE — 73502 X-RAY EXAM HIP UNI 2-3 VIEWS: CPT | Mod: LEFT SIDE | Performed by: RADIOLOGY

## 2024-10-14 PROCEDURE — 73502 X-RAY EXAM HIP UNI 2-3 VIEWS: CPT | Mod: LT

## 2024-10-14 PROCEDURE — 77073 BONE LENGTH STUDIES: CPT

## 2024-10-14 PROCEDURE — 99214 OFFICE O/P EST MOD 30 MIN: CPT | Performed by: STUDENT IN AN ORGANIZED HEALTH CARE EDUCATION/TRAINING PROGRAM

## 2024-10-14 PROCEDURE — 72170 X-RAY EXAM OF PELVIS: CPT | Mod: 59

## 2024-10-14 RX ORDER — CHLORTHALIDONE 25 MG/1
TABLET ORAL
COMMUNITY
Start: 2024-09-06

## 2024-10-14 ASSESSMENT — PAIN SCALES - GENERAL: PAINLEVEL_OUTOF10: 5 - MODERATE PAIN

## 2024-10-14 ASSESSMENT — PAIN - FUNCTIONAL ASSESSMENT: PAIN_FUNCTIONAL_ASSESSMENT: 0-10

## 2024-10-14 ASSESSMENT — PAIN DESCRIPTION - DESCRIPTORS: DESCRIPTORS: ACHING;THROBBING

## 2024-10-15 ENCOUNTER — TREATMENT (OUTPATIENT)
Dept: PHYSICAL THERAPY | Facility: CLINIC | Age: 64
End: 2024-10-15
Payer: COMMERCIAL

## 2024-10-15 DIAGNOSIS — M16.11 UNILATERAL PRIMARY OSTEOARTHRITIS, RIGHT HIP: ICD-10-CM

## 2024-10-15 PROCEDURE — 97110 THERAPEUTIC EXERCISES: CPT | Mod: GP | Performed by: PHYSICAL THERAPIST

## 2024-10-15 PROCEDURE — 97140 MANUAL THERAPY 1/> REGIONS: CPT | Mod: GP | Performed by: PHYSICAL THERAPIST

## 2024-10-15 NOTE — PROGRESS NOTES
Physical Therapy Follow-up    Patient Name: Otto Yen  MRN: [id number]  Today's Date: 10/15/2024  Time Calculation  Start Time: 0852  Stop Time: 0932  Time Calculation (min): 40 min      Visit#: 10. 50 approved.  Cert dates: NA    Precautions: Hydrocephalus. Low fall risk.    Subjective: Incr resting left hip pain since return to work last wk. 0/10 right hip pain. Easier to walk- right hip feeling stronger. HEP going well.      Objective:   PROM right HF: ~100- stiff. HE: ~5- stiff.     Treatment:   Therex: Modified Otto stretch with foot on bed, standing hip ext, standing hip abd, mini squats, standing march, SLS- all demo'd correctly.      Gait: Practiced walking with straight cane- demo'd correct use, CG  2 x ~100'- steady.  Instructed to continue with walker for longer walks outside of house or if on uneven surfaces. Practiced line walking with min UE contact at barre.     Manual: IV- hip flex, IR. IV-- HE  // PROM hip flex: ~105. HE: ~10 deg.    Assessment: Incr ROM with rx. Gait improving. Therex limited by LEFT hip pain.    Plan: Incr LE stabilization exer, gait training.

## 2024-10-17 ENCOUNTER — DOCUMENTATION (OUTPATIENT)
Dept: PREADMISSION TESTING | Facility: HOSPITAL | Age: 64
End: 2024-10-17

## 2024-10-17 ENCOUNTER — LAB (OUTPATIENT)
Dept: LAB | Facility: LAB | Age: 64
End: 2024-10-17
Payer: COMMERCIAL

## 2024-10-17 ENCOUNTER — PRE-ADMISSION TESTING (OUTPATIENT)
Dept: PREADMISSION TESTING | Facility: HOSPITAL | Age: 64
End: 2024-10-17
Payer: COMMERCIAL

## 2024-10-17 VITALS
HEIGHT: 73 IN | DIASTOLIC BLOOD PRESSURE: 83 MMHG | RESPIRATION RATE: 16 BRPM | OXYGEN SATURATION: 100 % | SYSTOLIC BLOOD PRESSURE: 155 MMHG | TEMPERATURE: 98.1 F | HEART RATE: 67 BPM | WEIGHT: 223.11 LBS | BODY MASS INDEX: 29.57 KG/M2

## 2024-10-17 DIAGNOSIS — I10 PRIMARY HYPERTENSION: ICD-10-CM

## 2024-10-17 DIAGNOSIS — I10 PRIMARY HYPERTENSION: Primary | ICD-10-CM

## 2024-10-17 LAB
ANION GAP SERPL CALC-SCNC: 12 MMOL/L (ref 10–20)
BASOPHILS # BLD AUTO: 0.07 X10*3/UL (ref 0–0.1)
BASOPHILS NFR BLD AUTO: 1.2 %
BUN SERPL-MCNC: 35 MG/DL (ref 6–23)
CALCIUM SERPL-MCNC: 9.3 MG/DL (ref 8.6–10.3)
CHLORIDE SERPL-SCNC: 104 MMOL/L (ref 98–107)
CO2 SERPL-SCNC: 25 MMOL/L (ref 21–32)
CREAT SERPL-MCNC: 1.29 MG/DL (ref 0.5–1.3)
EGFRCR SERPLBLD CKD-EPI 2021: 62 ML/MIN/1.73M*2
EOSINOPHIL # BLD AUTO: 0.7 X10*3/UL (ref 0–0.7)
EOSINOPHIL NFR BLD AUTO: 11.8 %
ERYTHROCYTE [DISTWIDTH] IN BLOOD BY AUTOMATED COUNT: 13.3 % (ref 11.5–14.5)
GLUCOSE SERPL-MCNC: 98 MG/DL (ref 74–99)
HCT VFR BLD AUTO: 33.9 % (ref 41–52)
HGB BLD-MCNC: 10.9 G/DL (ref 13.5–17.5)
IMM GRANULOCYTES # BLD AUTO: 0.02 X10*3/UL (ref 0–0.7)
IMM GRANULOCYTES NFR BLD AUTO: 0.3 % (ref 0–0.9)
LYMPHOCYTES # BLD AUTO: 0.81 X10*3/UL (ref 1.2–4.8)
LYMPHOCYTES NFR BLD AUTO: 13.7 %
MCH RBC QN AUTO: 28.3 PG (ref 26–34)
MCHC RBC AUTO-ENTMCNC: 32.2 G/DL (ref 32–36)
MCV RBC AUTO: 88 FL (ref 80–100)
MONOCYTES # BLD AUTO: 0.42 X10*3/UL (ref 0.1–1)
MONOCYTES NFR BLD AUTO: 7.1 %
NEUTROPHILS # BLD AUTO: 3.91 X10*3/UL (ref 1.2–7.7)
NEUTROPHILS NFR BLD AUTO: 65.9 %
NRBC BLD-RTO: 0 /100 WBCS (ref 0–0)
PLATELET # BLD AUTO: 286 X10*3/UL (ref 150–450)
POTASSIUM SERPL-SCNC: 4.4 MMOL/L (ref 3.5–5.3)
RBC # BLD AUTO: 3.85 X10*6/UL (ref 4.5–5.9)
SODIUM SERPL-SCNC: 137 MMOL/L (ref 136–145)
WBC # BLD AUTO: 5.9 X10*3/UL (ref 4.4–11.3)

## 2024-10-17 PROCEDURE — 87081 CULTURE SCREEN ONLY: CPT | Mod: AHULAB | Performed by: NURSE PRACTITIONER

## 2024-10-17 PROCEDURE — 99204 OFFICE O/P NEW MOD 45 MIN: CPT | Performed by: NURSE PRACTITIONER

## 2024-10-17 PROCEDURE — 36415 COLL VENOUS BLD VENIPUNCTURE: CPT

## 2024-10-17 PROCEDURE — 80048 BASIC METABOLIC PNL TOTAL CA: CPT

## 2024-10-17 PROCEDURE — 85025 COMPLETE CBC W/AUTO DIFF WBC: CPT

## 2024-10-17 RX ORDER — CHLORHEXIDINE GLUCONATE ORAL RINSE 1.2 MG/ML
SOLUTION DENTAL
Qty: 473 ML | Refills: 0 | Status: SHIPPED | OUTPATIENT
Start: 2024-10-17

## 2024-10-17 ASSESSMENT — ENCOUNTER SYMPTOMS
NUMBNESS: 1
CONSTITUTIONAL NEGATIVE: 1
ENDOCRINE NEGATIVE: 1
GASTROINTESTINAL NEGATIVE: 1
NECK NEGATIVE: 1
ARTHRALGIAS: 1
RESPIRATORY NEGATIVE: 1
LIMITED RANGE OF MOTION: 1
CARDIOVASCULAR NEGATIVE: 1

## 2024-10-17 NOTE — PREPROCEDURE INSTRUCTIONS
Medication List            Accurate as of October 17, 2024 10:01 AM. Always use your most recent med list.                acetaminophen 325 mg tablet  Commonly known as: Tylenol  Medication Adjustments for Surgery: Take/Use as prescribed     atorvastatin 40 mg tablet  Commonly known as: Lipitor  Medication Adjustments for Surgery: Take/Use as prescribed     chlorhexidine 0.12 % solution  Commonly known as: Peridex  SWISH AND SPIT 15ML FOR 30 SECONDS NIGHT PRIOR TO SURGERY AND MORNING OF SURGERY  Medication Adjustments for Surgery: Take/Use as prescribed     chlorthalidone 25 mg tablet  Commonly known as: Hygroton  Additional Medication Adjustments for Surgery: Other (Comment)  Notes to patient: Not taking     cyanocobalamin 1,000 mcg/mL injection  Commonly known as: Vitamin B-12  Medication Adjustments for Surgery: Do Not take on the morning of surgery     diclofenac sodium 1 % gel  Commonly known as: Voltaren  Medication Adjustments for Surgery: Do Not take on the morning of surgery     DIETARY SUPPLEMENT ORAL  Additional Medication Adjustments for Surgery: Take last dose 7 days before surgery     Fish OiL 1,200 (144-216) mg capsule  Generic drug: omega 3-dha-epa-fish oil  Additional Medication Adjustments for Surgery: Take last dose 7 days before surgery     fluticasone 50 mcg/actuation nasal spray  Commonly known as: Flonase  Medication Adjustments for Surgery: Take/Use as prescribed     gabapentin 600 mg tablet  Commonly known as: Neurontin  Medication Adjustments for Surgery: Take/Use as prescribed     glucosamine-chondroitin 500-400 mg tablet  Additional Medication Adjustments for Surgery: Take last dose 7 days before surgery     * ketoconazole 2 % shampoo  Commonly known as: NIZOral  Medication Adjustments for Surgery: Do Not take on the morning of surgery     * ketoconazole 2 % cream  Commonly known as: NIZOral  Medication Adjustments for Surgery: Do Not take on the morning of surgery     lidocaine 5 %  patch  Commonly known as: Lidoderm  Medication Adjustments for Surgery: Do Not take on the morning of surgery     metFORMIN  mg 24 hr tablet  Commonly known as: Glucophage-XR  Medication Adjustments for Surgery: Do Not take on the morning of surgery     multivitamin tablet  Additional Medication Adjustments for Surgery: Take last dose 7 days before surgery     pantoprazole 40 mg EC tablet  Commonly known as: ProtoNix  Take 1 tablet (40 mg) by mouth once daily in the morning. Take before meals for 28 days. Do not crush, chew, or split.  Medication Adjustments for Surgery: Take/Use as prescribed     pyridoxine 50 mg tablet  Commonly known as: Vitamin B-6  Medication Adjustments for Surgery: Do Not take on the morning of surgery     ramipril 10 mg capsule  Commonly known as: Altace  Medication Adjustments for Surgery: Take last dose 1 day (24 hours) before surgery  Notes to patient: Do NOT take evening for surgery and do NOT take morning of surgery     tacrolimus 0.1 % ointment  Commonly known as: Protopic  Medication Adjustments for Surgery: Take/Use as prescribed     tamsulosin 0.4 mg 24 hr capsule  Commonly known as: Flomax  Take 1 capsule (0.4 mg) by mouth once daily.  Medication Adjustments for Surgery: Take/Use as prescribed     thiamine 500 mg tablet  Commonly known as: Vitamin B-1  Medication Adjustments for Surgery: Do Not take on the morning of surgery     triamcinolone 0.1 % ointment  Commonly known as: Kenalog  Medication Adjustments for Surgery: Do Not take on the morning of surgery     VITAMIN D3-VITAMIN K2 ORAL  Additional Medication Adjustments for Surgery: Take last dose 7 days before surgery           * This list has 2 medication(s) that are the same as other medications prescribed for you. Read the directions carefully, and ask your doctor or other care provider to review them with you.              CONTACT SURGEON'S OFFICE IF YOU DEVELOP:  * Fever = 100.4 F   * New respiratory symptoms (e.g.  cough, shortness of breath, respiratory distress, sore throat)  * Recent loss of taste or smell  *Flu like symptoms such as headache, fatigue or gastrointestinal symptoms  * You develop any open sores, shingles, burning or painful urination   AND/OR:  * You no longer wish to have the surgery.  * Any other personal circumstances change that may lead to the need to cancel or defer this surgery.  *You were admitted to any hospital within one week of your planned procedure.    SMOKING:  *Quitting smoking can make a huge difference to your health and recovery from surgery.    *If you need help with quitting, call 0-003-QUIT-NOW.    THE DAY BEFORE SURGERY:  *Do not eat any food after midnight the night before your surgery.   *You may have up to 13.5 OUNCES of clear liquids until TWO hours before your instructed ARRIVAL TIME to hospital. This includes water, black tea/coffee, (no milk or cream) apple juice, clear broth and electrolyte drinks (Gatorade). Please avoid clear liquids that are red in color.   *You may chew gum/mints up to TWO hours before your surgery/procedure.    SURGICAL TIME:  *You will be contacted between 2 p.m. and 6 p.m. the business day before your surgery with your arrival time.  *If you haven't received a call by 6pm, call [phone].  *Scheduled surgery times may change and you will be notified if this occurs-check your personal voicemail for any updates.    ON THE MORNING OF SURGERY:  *Wear comfortable, loose fitting clothing.   *Do not use moisturizers, creams, lotions or perfume.  *All jewelry and valuables should be left at home.  *Prosthetic devices such as contact lenses, hearing aids, dentures, eyelash extensions, hairpins and body piercing must be removed before surgery.    BRING WITH YOU:  *Photo ID and insurance card  *Current list of medications and allergies  *Pacemaker/Defibrillator/Heart stent cards  *CPAP machine and mask  *Slings/splints/crutches  *Copy of your complete Advanced  Directive/DHPOA-if applicable  *Neurostimulator implant remote    PARKING AND ARRIVAL:  *Check in at the Main Entrance desk and let them know you are here for surgery.  *You will be directed to the 2nd floor surgical waiting area.    IF YOU ARE HAVING OUTPATIENT/SAME DAY SURGERY:  *A responsible adult MUST accompany you at the time of discharge and stay with you for 24 hours after your surgery.  *You may NOT drive yourself home after surgery.  *You may use a taxi or ride sharing service ([employer], Uber) to return home ONLY if you are accompanied by a friend or family member.  *Instructions for resuming your medications will be provided by your surgeon.          Patient Information: Oral/Dental Rinse  **This is a prescription; pick it up at your preferred local pharmacy **  What is oral/dental rinse?   It is a mouthwash. It is a way of cleaning the mouth with a germ killing solution before your surgery.  The solution contains chlorhexidine, commonly known as CHG.   It is used inside the mouth to kill a bacteria known as Staphylococcus aureus.  Let your doctor know if you are allergic to Chlorhexidine.    Why do I need to use CHG oral/dental rinse?  The CHG oral/dental rinse helps to kill a bacteria in your mouth known a Staphylococcus aureus.     This reduces the risk of infection at the surgical site.      Using your CHG oral/dental rinse  STEPS:  Use your CHG oral/dental rinse after you brush your teeth the night before (at bedtime) and the morning of your surgery.  Follow all directions on your prescription label.    Use the cap on the container to measure 15ml (fill cap to fill line)  Swish (gargle if you can) the mouthwash in your mouth for at least 30 seconds, (do not to swallow) spit out  After you use your CHG rinse, do not rinse your mouth with water, drink or eat.  Please refer to prescription label for the appropriate time to resume oral intake  Dental rinse comes in one size bottle: 473ml ~16oz.  You will  have leftover    rinse, discard after this use.    What side effects might I have using the CHG oral/dental rinse?  CHG rinse will stick to plaque on the teeth.  Brush and floss just before use.  Teeth brushing will help avoid staining of plaque during use.    Who should I contact if I have questions about the CHG oral/dental rinse?  Please call [facility], Preadmission Testing at [phone] if you have any questions      Home Preoperative Antibacterial Shower     What is a home preoperative antibacterial shower?  This shower is a way of cleaning the skin with a germ killing soap before surgery.  The soap contains chlorhexidine, commonly known as CHG.  CHG is a soap for your skin with germ killing ability.  Let your doctor know if you are allergic to chlorhexidine.    Why do I need to take a preoperative antibacterial shower?  Skin is not sterile.  It is best to try to make your skin as free of germs as possible before surgery.  Proper cleansing with a germ killing soap before surgery can lower the number of germs on your skin.  This helps to reduce the risk of infection at the surgical site.  Following the instructions listed below will help you prepare your skin for surgery.      How do I use the CHG skin cleanser?  Steps:  Begin using your CHG soap five days before your scheduled surgery on ________________________.    Days 1-4 Shower before bed:  Wash your face and genitals with your normal soap and rinse.    Wash and rinse your hair using the CHG soap. Rinse completely, do not condition your   Hair.          3.    Apply the CHG soap to a clean wet washcloth.  Turn the water off or move away                From the water spray to avoid premature rinsing of the CHG soap as you are applying.     4.   Lather your entire body from the neck down.  Do not use on your face or genitals.   Pay special attention to the area(s) where your incision(s) will be located unless they are on your  face.  Avoid scrubbing your skin too hard.  The important point is to have the CHG soap sit on your skin for 3 minutes.    When the 3 minutes are up, turn on the water and rinse the CHG soap off your body completely.   Pat yourself dry with a clean, freshly-laundered towel.  Dress in clean, freshly laundered night clothes.    Be sure to sleep with clean, freshly laundered sheets.  Day 5:  Last shower is the morning before surgery: Follow above Instructions.    NOTE:    *Hair extensions should be removed    *Keep CHG soap out of eyes and ear canals   *DO NOT wash with regular soap on your body after you have used the CHG        soap solution  *DO NOT apply powders, lotions, or perfume.  *Deodorant may be used days 1-4, BUT NOT the day of surgery    Who should I contact if I have any questions regarding the use of CHG soap?  Call the [facility], Preadmission Testing at [phone] if you have any questions.              Patient Information: Pre-Operative Infection Prevention Measures     Why did I have my nose, under my arms and groin swabbed?  The purpose of the swab is to identify Staphylococcus aureus inside your nose or on your skin.  The swab was sent to the laboratory for culture.  A positive swab/culture for Staphylococcus aureus is called colonization or carriage.      What is Staphylococcus aureus?  Staphylococcus aureus, also known as “staph”, is a germ found on the skin or in the nose of healthy people.  Sometimes Staphylococcus aureus can get into the body and cause an infection.  This can be minor (such as pimples, boils or other skin problems).  It might also be serious (such as blood infection, pneumonia or a surgical site infection).    What is Staphylococcus aureus colonization or carriage?  Colonization or carriage means that a person has the germ but is not sick from it.  These bacteria can be spread on the hands or when breathing or sneezing.    How is Staphylococcus  aureus spread?  It is most often spread by close contact with a person or item that carries it.    What happens if my culture is positive for Staphylococcus aureus?  Your doctor/medical team will use this information to guide any antibiotic treatment which may be necessary.  Regardless of the culture results, we will clean the inside of your nose with a betadine swab just before you have your surgery.      Will I get an infection if I have Staphylococcus aureus in my nose or on my skin?  Anyone can get an infection with Staphylococcus aureus.  However, the best way to reduce your risk of infection is to follow the instructions provided to you for the use of your CHG soap and dental rinse.        Who should I contact if I have any questions?  Call the [facility], Preadmission Testing at [phone] if you have any questions.
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
## 2024-10-17 NOTE — CPM/PAT NURSE NOTE
CPM/PAT Nurse Note      Name: Otto Yen (Otto Yen)  /Age: 1960/64 y.o.       Past Medical History:   Diagnosis Date    Alcohol abuse, in remission     Quit 2023    Anemia 06/10/2024    H/H .5     Arthritis     Cataract     Chronic kidney disease     GFR-55, Creat-1.43, BUN-36 6/10/24    Eczema     HLD (hyperlipidemia)     HTN (hypertension)     Hydrocephalus     as child, shunt placed at age 1    Hypertension     Intraventricular hemorrhage (Multi)     spontaneous, craniotomy at age 29 for SDH evacuation    Irritable bowel syndrome     Liver disease     Liver enzymes normalized after stopped drinking alcohol     Pre-diabetes     a1c 6 24    Reactive airway disease (HHS-HCC)     no current inhaler use or need, used inhaler once 2/2 dust allergy    Shuffling gait     recent increase; seeing neurosurgery 24 for 2nd opinion    Sleep apnea     uses CPAP nightly, ? setting    Squamous cell skin cancer     head       Past Surgical History:   Procedure Laterality Date    CRANIOTOMY      SDH evacuation at age 29    CYSTOSCOPY      HIP ARTHROPLASTY Right 2024    KNEE Left     patella removed    VENTRICULOPERITONEAL SHUNT Right     age 1    VENTRICULOPERITONEAL SHUNT Right     Revision    VENTRICULOPERITONEAL SHUNT Right     Revision/ neuro clearance 24 idalmis dailey       Patient  reports that he is not currently sexually active and has had partner(s) who are female.    Family History   Problem Relation Name Age of Onset    Lung cancer Sister      No Known Problems Sister      No Known Problems Sister      Blood Disorder Brother      No Known Problems Brother         Allergies   Allergen Reactions    Peanut Other     Throat swelling    Penicillin Hives     as a child-       Prior to Admission medications    Medication Sig Start Date End Date Taking? Authorizing Provider   acetaminophen (Tylenol) 325 mg tablet Take 1 tablet (325 mg) by mouth every 6 hours if  needed for mild pain (1 - 3).    Historical Provider, MD   atorvastatin (Lipitor) 40 mg tablet Take 1 tablet (40 mg) by mouth once daily.    Historical Provider, MD   chlorhexidine (Peridex) 0.12 % solution SWISH AND SPIT 15ML FOR 30 SECONDS NIGHT PRIOR TO SURGERY AND MORNING OF SURGERY 10/17/24   Sisi Vargas APRN-CNP   chlorthalidone (Hygroton) 25 mg tablet TAKE 1 TABLET BY MOUTH DAILY AS DIRECTED FOR 30 DAYS, FOR BLOOD PRESSURE.  Patient not taking: Reported on 10/17/2024 9/6/24   Historical Provider, MD   cholecalciferol, vitD3,/vit K2 (VITAMIN D3-VITAMIN K2 ORAL) Take 1 tablet by mouth once daily.    Historical Provider, MD   cyanocobalamin (Vitamin B-12) 1,000 mcg/mL injection Inject 1 mL (1,000 mcg) into the muscle every 30 (thirty) days. Given 10/9/24    Historical Provider, MD   diclofenac sodium (Voltaren) 1 % gel APPLY 2 GRAMS TOPICALLY TO THE AFFECTED AREA UP TO FOUR TIMES DAILY  Patient not taking: Reported on 10/17/2024 10/17/23   Historical Provider, MD   DIETARY SUPPLEMENT ORAL Take 11 capsules by mouth once daily. probiotic    Historical Provider, MD   fluticasone (Flonase) 50 mcg/actuation nasal spray Administer 1 spray into each nostril once daily. 10/7/24   Historical Provider, MD   gabapentin (Neurontin) 600 mg tablet Take 1.5 tablets (900 mg) by mouth once daily at bedtime.    Historical Provider, MD   glucosamine-chondroitin 500-400 mg tablet Take 1 tablet by mouth once daily.    Historical Provider, MD   ketoconazole (NIZOral) 2 % cream APPLY ONCE DAILY TO RASH ON FACE AND EARS UNTIL CLEAR. REPEAT AS NEEDED FOR FLARES.  Patient not taking: Reported on 10/17/2024    Historical Provider, MD   ketoconazole (NIZOral) 2 % shampoo PLEASE SEE ATTACHED FOR DETAILED DIRECTIONS    Historical Provider, MD   lidocaine (Lidoderm) 5 % patch APPLY 1 PATCH TWICE A DAY BY TOPICAL ROUTE AS DIRECTED FOR 30 DAYS.  Patient not taking: Reported on 10/17/2024 6/21/24   Historical Provider, MD   metFORMIN   mg 24 hr tablet Take 1 tablet (500 mg) by mouth once daily. Do not crush, chew, or split.    Historical Provider, MD   multivitamin tablet Take 1 tablet by mouth once daily.    Historical Provider, MD   omega 3-dha-epa-fish oil (Fish OiL) 1,200 (144-216) mg capsule Take 1 capsule (1,200 mg) by mouth.    Historical Provider, MD   pantoprazole (ProtoNix) 40 mg EC tablet Take 1 tablet (40 mg) by mouth once daily in the morning. Take before meals for 28 days. Do not crush, chew, or split. 8/21/24 9/18/24  Mei Lorenzo MD   pyridoxine (Vitamin B-6) 50 mg tablet Take 1 tablet (50 mg) by mouth once daily.    Historical Provider, MD   ramipril (Altace) 10 mg capsule Take 1 capsule (10 mg) by mouth once daily.    Historical Provider, MD   tacrolimus (Protopic) 0.1 % ointment Apply 1 Application topically if needed.    Historical Provider, MD   tamsulosin (Flomax) 0.4 mg 24 hr capsule Take 1 capsule (0.4 mg) by mouth once daily.  Patient not taking: Reported on 10/17/2024 9/27/24 9/22/25  Jl Cruz MD MPH   thiamine (Vitamin B-1) 500 mg tablet Take 1 tablet (500 mg) by mouth once daily.    Historical Provider, MD   triamcinolone (Kenalog) 0.1 % ointment Apply 1 Application topically once daily.  Patient not taking: Reported on 10/17/2024 9/25/24   Historical Provider, MD   metoprolol succinate XL (Toprol-XL) 100 mg 24 hr tablet Take 1 tablet (100 mg) by mouth once daily. Do not crush or chew.  10/17/24  Historical Provider, MD   verapamil ER (Veralan PM) 240 mg 24 hr capsule Take 1 capsule (240 mg) by mouth once daily at bedtime. Do not crush or chew.  10/17/24  Historical Provider, MD CHIRAG FORDE Risk Score    No data to display       Caprini DVT Assessment      Flowsheet Row Admission (Discharged) from 8/20/2024 in [facility] Bldg [facility] with Kiara Menchaca MD   DVT Score 10 filed at 08/20/2024 1716   BMI 31-40 (Obesity) filed at 08/20/2024 1714   RETIRED: Current Status Elective  major lower extremity arthroplasty filed at 08/20/2024 1716   RETIRED: Age 60-75 years filed at 08/20/2024 1716          Modified Frailty Index    No data to display       CHADS2 Stroke Risk  Current as of 2 hours ago        N/A 3 to 100%: High Risk   2 to < 3%: Medium Risk   0 to < 2%: Low Risk     Last Change: N/A          This score determines the patient's risk of having a stroke if the patient has atrial fibrillation.        This score is not applicable to this patient. Components are not calculated.          Revised Cardiac Risk Index    No data to display       Apfel Simplified Score    No data to display       Risk Analysis Index Results This Encounter    No data found in the last 10 encounters.       Stop Bang Score      Flowsheet Row FL LUMBAR SPINE MYELOGRAM WITH LUMBAR PUNCTURE from 4/22/2024 in [facility]   Do you snore loudly? 1 filed at 04/22/2024 0757   Do you often feel tired or fatigued after your sleep? 0 filed at 04/22/2024 0757   Has anyone ever observed you stop breathing in your sleep? 1 filed at 04/22/2024 0757   Do you have or are you being treated for high blood pressure? 1 filed at 04/22/2024 0757   Recent BMI (Calculated) 27.9 filed at 04/22/2024 0757   Is BMI greater than 35 kg/m2? 0=No filed at 04/22/2024 0757   Age older than 50 years old? 1=Yes filed at 04/22/2024 0757   Is your neck circumference greater than 17 inches (Male) or 16 inches (Female)? 0 filed at 04/22/2024 0757   Gender - Male 1=Yes filed at 04/22/2024 0757   STOP-BANG Total Score 5 filed at 04/22/2024 0757            Nurse Plan of Action:       After Visit Summary (AVS) reviewed and patient verbalized good understanding of medications and NPO instructions.  Pre-op infection prevention measures:  CHG showers and mouthwash reviewed, understanding voiced.  CHG soap given and patient verbalized need to pick CHG mouthwash at their preferred local pharmacy.

## 2024-10-18 ENCOUNTER — APPOINTMENT (OUTPATIENT)
Dept: ORTHOPEDIC SURGERY | Facility: CLINIC | Age: 64
End: 2024-10-18
Payer: COMMERCIAL

## 2024-10-18 ENCOUNTER — APPOINTMENT (OUTPATIENT)
Dept: UROLOGY | Facility: CLINIC | Age: 64
End: 2024-10-18
Payer: COMMERCIAL

## 2024-10-18 ENCOUNTER — TREATMENT (OUTPATIENT)
Dept: PHYSICAL THERAPY | Facility: CLINIC | Age: 64
End: 2024-10-18
Payer: COMMERCIAL

## 2024-10-18 DIAGNOSIS — M16.11 UNILATERAL PRIMARY OSTEOARTHRITIS, RIGHT HIP: ICD-10-CM

## 2024-10-18 PROCEDURE — 97110 THERAPEUTIC EXERCISES: CPT | Mod: GP | Performed by: PHYSICAL THERAPIST

## 2024-10-18 PROCEDURE — 97140 MANUAL THERAPY 1/> REGIONS: CPT | Mod: GP | Performed by: PHYSICAL THERAPIST

## 2024-10-18 NOTE — PROGRESS NOTES
Physical Therapy Follow-up    Patient Name: Otto Yen  MRN: [id number]  Today's Date: 10/18/2024  Time Calculation  Start Time: 0749  Stop Time: 0830  Time Calculation (min): 41 min      Visit#: 11. 50 approved.  Cert dates: NA    Precautions: Hydrocephalus. Low fall risk.    Subjective: Incr resting left hip pain since return to work last wk. 0/10 right hip pain. Easier to walk- right hip feeling stronger. Has not used walker since last here- feels steady. HEP going well.      Objective:   PROM right HF: ~100- stiff. HE: ~5- stiff.     Treatment:   Therex: Modified Otto stretch with foot on bed, standing hip ext, standing hip abd, mini squats, standing march, SLS- all demo'd correctly.      Gait: Practiced walking with straight cane- demo'd correct use, CG  2 x ~100'- steady.  Instructed to continue with walker for longer walks outside of house or if on uneven surfaces. Practiced line walking with min UE contact at barre- rarely needs UE assist.     Manual: IV- hip flex, IR. IV-- HE  // PROM hip flex: ~105. HE: ~10 deg.    Assessment: Incr ROM with rx. Gait improving. Therex limited by LEFT hip pain.    Plan: Incr LE stabilization exer, gait training.

## 2024-10-19 LAB — STAPHYLOCOCCUS SPEC CULT: ABNORMAL

## 2024-10-20 DIAGNOSIS — M16.32 OSTEOARTHRITIS RESULTING FROM LEFT HIP DYSPLASIA: Primary | ICD-10-CM

## 2024-10-20 RX ORDER — MUPIROCIN 20 MG/G
OINTMENT TOPICAL 2 TIMES DAILY
Qty: 15 G | Refills: 0 | Status: SHIPPED | OUTPATIENT
Start: 2024-10-20 | End: 2024-10-25

## 2024-10-20 NOTE — PROGRESS NOTES
You tested positive for MSSA/MRSA on preoperative screening evaluation. I have sent Mupirocin to your pharmacy. Please  the prescription and complete treatment before your upcoming joint replacement surgery. Thank you!

## 2024-10-21 ENCOUNTER — TELEPHONE (OUTPATIENT)
Dept: ORTHOPEDIC SURGERY | Facility: HOSPITAL | Age: 64
End: 2024-10-21
Payer: COMMERCIAL

## 2024-10-21 NOTE — TELEPHONE ENCOUNTER
Called patient to discuss upcoming surgery. Confirmed that the plan is for an overnight hospital stay. The patient has obtained their medical equipment. The patient does have a care partner to assist them at home postoperatively. They live in a condo.  The patient does not have stairs required for navigating the home. The patient currently uses a walker for ambulation. Reminded patient to follow PAT instructions leading up to surgery and to watch for a phone call from preop the day prior to their surgery to receive details about arrival time. All questions answered at this time.

## 2024-10-23 ENCOUNTER — APPOINTMENT (OUTPATIENT)
Dept: PHYSICAL THERAPY | Facility: CLINIC | Age: 64
End: 2024-10-23
Payer: COMMERCIAL

## 2024-10-25 ENCOUNTER — TREATMENT (OUTPATIENT)
Dept: PHYSICAL THERAPY | Facility: CLINIC | Age: 64
End: 2024-10-25
Payer: COMMERCIAL

## 2024-10-25 DIAGNOSIS — M16.11 UNILATERAL PRIMARY OSTEOARTHRITIS, RIGHT HIP: ICD-10-CM

## 2024-10-25 PROCEDURE — 97140 MANUAL THERAPY 1/> REGIONS: CPT | Mod: GP | Performed by: PHYSICAL THERAPIST

## 2024-10-25 PROCEDURE — 97110 THERAPEUTIC EXERCISES: CPT | Mod: GP | Performed by: PHYSICAL THERAPIST

## 2024-10-25 NOTE — PROGRESS NOTES
"Physical Therapy Follow-up    Patient Name: Otto Yen  MRN: [id number]  Today's Date: 10/25/2024  Time Calculation  Start Time: 0749  Stop Time: 0830  Time Calculation (min): 41 min      Visit#: 11. 50 approved.  Cert dates: NA    Precautions: Hydrocephalus. Low fall risk.    Subjective: Slight incr resting left hip pain lately. 0/10 right hip pain. Easier to walk- right hip feeling stronger. HEP going well.      Objective:   PROM right HF: ~100- stiff. HE: ~5- stiff.     Treatment:   Therex: Modified Otto stretch with foot on bed, standing hip ext, standing hip abd, mini squats, standing march, SLS, step ups to 6\" step- all demo'd correctly.      Gait: Practiced walking with straight cane- demo'd correct use x ~100'- steady.  Instructed to continue with walker for longer walks outside of house or if on uneven surfaces. Practiced line walking with min UE contact at barre- rarely needs UE assist.     Manual: IV- hip flex, IR. IV-- HE  // PROM hip flex: ~105. HE: ~10 deg.    Assessment: Incr ROM with rx. Gait improving.     Plan: Incr LE stabilization exer, gait training.       "

## 2024-10-27 RX ORDER — ONDANSETRON 4 MG/1
4 TABLET, FILM COATED ORAL EVERY 8 HOURS PRN
Qty: 20 TABLET | Refills: 0 | Status: SHIPPED | OUTPATIENT
Start: 2024-10-27

## 2024-10-27 RX ORDER — CEFAZOLIN 1 G/1
500 INJECTION, POWDER, FOR SOLUTION INTRAVENOUS ONCE
Status: CANCELLED | OUTPATIENT
Start: 2024-10-27 | End: 2024-10-27

## 2024-10-27 RX ORDER — CEFADROXIL 500 MG/1
500 CAPSULE ORAL 2 TIMES DAILY
Qty: 14 CAPSULE | Refills: 0 | Status: SHIPPED | OUTPATIENT
Start: 2024-10-27 | End: 2024-11-08

## 2024-10-27 RX ORDER — NAPROXEN SODIUM 220 MG/1
81 TABLET, FILM COATED ORAL 2 TIMES DAILY
Qty: 84 TABLET | Refills: 0 | Status: SHIPPED | OUTPATIENT
Start: 2024-10-27 | End: 2024-12-13

## 2024-10-27 RX ORDER — ACETAMINOPHEN 500 MG
1000 TABLET ORAL EVERY 8 HOURS
Qty: 360 TABLET | Refills: 0 | Status: SHIPPED | OUTPATIENT
Start: 2024-10-27 | End: 2024-12-26

## 2024-10-27 RX ORDER — PANTOPRAZOLE SODIUM 40 MG/1
40 TABLET, DELAYED RELEASE ORAL
Qty: 30 TABLET | Refills: 0 | Status: SHIPPED | OUTPATIENT
Start: 2024-10-27 | End: 2024-12-01

## 2024-10-27 RX ORDER — SODIUM CHLORIDE 9 MG/ML
22 INJECTION INTRAMUSCULAR; INTRAVENOUS; SUBCUTANEOUS ONCE
Status: CANCELLED | OUTPATIENT
Start: 2024-10-27 | End: 2024-10-27

## 2024-10-27 RX ORDER — OXYCODONE HYDROCHLORIDE 5 MG/1
5-10 TABLET ORAL EVERY 6 HOURS PRN
Qty: 40 TABLET | Refills: 0 | Status: SHIPPED | OUTPATIENT
Start: 2024-10-27 | End: 2024-11-08

## 2024-10-27 RX ORDER — TRAMADOL HYDROCHLORIDE 50 MG/1
50-100 TABLET ORAL EVERY 6 HOURS PRN
Qty: 40 TABLET | Refills: 0 | Status: SHIPPED | OUTPATIENT
Start: 2024-10-27 | End: 2024-11-08

## 2024-10-27 RX ORDER — BUPIVACAINE HCL/EPINEPHRINE 0.5-1:200K
30 VIAL (ML) INJECTION ONCE
Status: CANCELLED | OUTPATIENT
Start: 2024-10-27 | End: 2024-10-27

## 2024-10-27 RX ORDER — SENNOSIDES 8.6 MG/1
1 TABLET ORAL DAILY
Qty: 15 TABLET | Refills: 0 | Status: SHIPPED | OUTPATIENT
Start: 2024-10-27 | End: 2024-11-16

## 2024-10-27 RX ORDER — DOCUSATE SODIUM 100 MG/1
100 CAPSULE, LIQUID FILLED ORAL 2 TIMES DAILY
Qty: 30 CAPSULE | Refills: 0 | Status: SHIPPED | OUTPATIENT
Start: 2024-10-27 | End: 2024-11-16

## 2024-10-27 RX ORDER — MELOXICAM 15 MG/1
15 TABLET ORAL DAILY
Qty: 60 TABLET | Refills: 0 | Status: SHIPPED | OUTPATIENT
Start: 2024-10-27 | End: 2024-12-26

## 2024-10-28 ENCOUNTER — TREATMENT (OUTPATIENT)
Dept: PHYSICAL THERAPY | Facility: CLINIC | Age: 64
End: 2024-10-28
Payer: COMMERCIAL

## 2024-10-28 ENCOUNTER — HOME HEALTH ADMISSION (OUTPATIENT)
Dept: HOME HEALTH SERVICES | Facility: HOME HEALTH | Age: 64
End: 2024-10-28
Payer: COMMERCIAL

## 2024-10-28 DIAGNOSIS — M16.11 UNILATERAL PRIMARY OSTEOARTHRITIS, RIGHT HIP: ICD-10-CM

## 2024-10-28 PROCEDURE — 97140 MANUAL THERAPY 1/> REGIONS: CPT | Mod: GP | Performed by: PHYSICAL THERAPIST

## 2024-10-28 PROCEDURE — 97110 THERAPEUTIC EXERCISES: CPT | Mod: GP | Performed by: PHYSICAL THERAPIST

## 2024-10-30 ENCOUNTER — APPOINTMENT (OUTPATIENT)
Dept: PHYSICAL THERAPY | Facility: CLINIC | Age: 64
End: 2024-10-30
Payer: COMMERCIAL

## 2024-10-30 ENCOUNTER — ANESTHESIA EVENT (OUTPATIENT)
Dept: OPERATING ROOM | Facility: HOSPITAL | Age: 64
End: 2024-10-30
Payer: COMMERCIAL

## 2024-10-31 ENCOUNTER — APPOINTMENT (OUTPATIENT)
Dept: RADIOLOGY | Facility: HOSPITAL | Age: 64
End: 2024-10-31
Payer: COMMERCIAL

## 2024-10-31 ENCOUNTER — ANESTHESIA (OUTPATIENT)
Dept: OPERATING ROOM | Facility: HOSPITAL | Age: 64
End: 2024-10-31
Payer: COMMERCIAL

## 2024-10-31 ENCOUNTER — HOSPITAL ENCOUNTER (OUTPATIENT)
Facility: HOSPITAL | Age: 64
Discharge: HOME | End: 2024-11-01
Attending: STUDENT IN AN ORGANIZED HEALTH CARE EDUCATION/TRAINING PROGRAM | Admitting: STUDENT IN AN ORGANIZED HEALTH CARE EDUCATION/TRAINING PROGRAM
Payer: COMMERCIAL

## 2024-10-31 DIAGNOSIS — M16.11 UNILATERAL PRIMARY OSTEOARTHRITIS, RIGHT HIP: Primary | ICD-10-CM

## 2024-10-31 DIAGNOSIS — M16.12 UNILATERAL PRIMARY OSTEOARTHRITIS, LEFT HIP: ICD-10-CM

## 2024-10-31 PROBLEM — Z96.642 S/P TOTAL LEFT HIP ARTHROPLASTY: Status: ACTIVE | Noted: 2024-10-31

## 2024-10-31 LAB — GLUCOSE BLD MANUAL STRIP-MCNC: 106 MG/DL (ref 74–99)

## 2024-10-31 PROCEDURE — 2500000004 HC RX 250 GENERAL PHARMACY W/ HCPCS (ALT 636 FOR OP/ED): Performed by: ANESTHESIOLOGY

## 2024-10-31 PROCEDURE — 9420000001 HC RT PATIENT EDUCATION 5 MIN

## 2024-10-31 PROCEDURE — 7100000002 HC RECOVERY ROOM TIME - EACH INCREMENTAL 1 MINUTE: Performed by: STUDENT IN AN ORGANIZED HEALTH CARE EDUCATION/TRAINING PROGRAM

## 2024-10-31 PROCEDURE — 76000 FLUOROSCOPY <1 HR PHYS/QHP: CPT | Mod: LT

## 2024-10-31 PROCEDURE — 2700000047 HC OR 270 NO HCPCS: Performed by: STUDENT IN AN ORGANIZED HEALTH CARE EDUCATION/TRAINING PROGRAM

## 2024-10-31 PROCEDURE — 97161 PT EVAL LOW COMPLEX 20 MIN: CPT | Mod: GP

## 2024-10-31 PROCEDURE — 72170 X-RAY EXAM OF PELVIS: CPT

## 2024-10-31 PROCEDURE — 2500000001 HC RX 250 WO HCPCS SELF ADMINISTERED DRUGS (ALT 637 FOR MEDICARE OP): Performed by: STUDENT IN AN ORGANIZED HEALTH CARE EDUCATION/TRAINING PROGRAM

## 2024-10-31 PROCEDURE — 7100000011 HC EXTENDED STAY RECOVERY HOURLY - NURSING UNIT

## 2024-10-31 PROCEDURE — 27130 TOTAL HIP ARTHROPLASTY: CPT | Performed by: STUDENT IN AN ORGANIZED HEALTH CARE EDUCATION/TRAINING PROGRAM

## 2024-10-31 PROCEDURE — 3600000018 HC OR TIME - INITIAL BASE CHARGE - PROCEDURE LEVEL SIX: Performed by: STUDENT IN AN ORGANIZED HEALTH CARE EDUCATION/TRAINING PROGRAM

## 2024-10-31 PROCEDURE — 2720000007 HC OR 272 NO HCPCS: Performed by: STUDENT IN AN ORGANIZED HEALTH CARE EDUCATION/TRAINING PROGRAM

## 2024-10-31 PROCEDURE — 2500000004 HC RX 250 GENERAL PHARMACY W/ HCPCS (ALT 636 FOR OP/ED): Performed by: ANESTHESIOLOGIST ASSISTANT

## 2024-10-31 PROCEDURE — C1713 ANCHOR/SCREW BN/BN,TIS/BN: HCPCS | Performed by: STUDENT IN AN ORGANIZED HEALTH CARE EDUCATION/TRAINING PROGRAM

## 2024-10-31 PROCEDURE — 3700000002 HC GENERAL ANESTHESIA TIME - EACH INCREMENTAL 1 MINUTE: Performed by: STUDENT IN AN ORGANIZED HEALTH CARE EDUCATION/TRAINING PROGRAM

## 2024-10-31 PROCEDURE — C1176 HC OR 278 NO HCPCS: HCPCS | Performed by: STUDENT IN AN ORGANIZED HEALTH CARE EDUCATION/TRAINING PROGRAM

## 2024-10-31 PROCEDURE — 3700000001 HC GENERAL ANESTHESIA TIME - INITIAL BASE CHARGE: Performed by: STUDENT IN AN ORGANIZED HEALTH CARE EDUCATION/TRAINING PROGRAM

## 2024-10-31 PROCEDURE — 72170 X-RAY EXAM OF PELVIS: CPT | Performed by: STUDENT IN AN ORGANIZED HEALTH CARE EDUCATION/TRAINING PROGRAM

## 2024-10-31 PROCEDURE — 3600000017 HC OR TIME - EACH INCREMENTAL 1 MINUTE - PROCEDURE LEVEL SIX: Performed by: STUDENT IN AN ORGANIZED HEALTH CARE EDUCATION/TRAINING PROGRAM

## 2024-10-31 PROCEDURE — 2500000004 HC RX 250 GENERAL PHARMACY W/ HCPCS (ALT 636 FOR OP/ED): Performed by: NURSE ANESTHETIST, CERTIFIED REGISTERED

## 2024-10-31 PROCEDURE — 97110 THERAPEUTIC EXERCISES: CPT | Mod: GP

## 2024-10-31 PROCEDURE — 2500000004 HC RX 250 GENERAL PHARMACY W/ HCPCS (ALT 636 FOR OP/ED): Mod: JZ | Performed by: STUDENT IN AN ORGANIZED HEALTH CARE EDUCATION/TRAINING PROGRAM

## 2024-10-31 PROCEDURE — 2500000005 HC RX 250 GENERAL PHARMACY W/O HCPCS: Performed by: STUDENT IN AN ORGANIZED HEALTH CARE EDUCATION/TRAINING PROGRAM

## 2024-10-31 PROCEDURE — 2500000005 HC RX 250 GENERAL PHARMACY W/O HCPCS: Performed by: ANESTHESIOLOGIST ASSISTANT

## 2024-10-31 PROCEDURE — 7100000001 HC RECOVERY ROOM TIME - INITIAL BASE CHARGE: Performed by: STUDENT IN AN ORGANIZED HEALTH CARE EDUCATION/TRAINING PROGRAM

## 2024-10-31 PROCEDURE — 82947 ASSAY GLUCOSE BLOOD QUANT: CPT

## 2024-10-31 PROCEDURE — 2780000003 HC OR 278 NO HCPCS: Performed by: STUDENT IN AN ORGANIZED HEALTH CARE EDUCATION/TRAINING PROGRAM

## 2024-10-31 PROCEDURE — C1776 JOINT DEVICE (IMPLANTABLE): HCPCS | Performed by: STUDENT IN AN ORGANIZED HEALTH CARE EDUCATION/TRAINING PROGRAM

## 2024-10-31 PROCEDURE — RXMED WILLOW AMBULATORY MEDICATION CHARGE

## 2024-10-31 DEVICE — IMPLANTABLE DEVICE: Type: IMPLANTABLE DEVICE | Site: HIP | Status: FUNCTIONAL

## 2024-10-31 DEVICE — SCREW CANCELLOUS 6.5 X 25: Type: IMPLANTABLE DEVICE | Site: HIP | Status: FUNCTIONAL

## 2024-10-31 DEVICE — EMPHASYS ACETABULAR SHELL THREE-HOLE 52MM CEMENTLESS
Type: IMPLANTABLE DEVICE | Site: HIP | Status: FUNCTIONAL
Brand: EMPHASYS

## 2024-10-31 RX ORDER — IBUPROFEN 600 MG/1
600 TABLET ORAL 3 TIMES DAILY
Status: CANCELLED | OUTPATIENT
Start: 2024-10-31

## 2024-10-31 RX ORDER — SODIUM CHLORIDE, SODIUM LACTATE, POTASSIUM CHLORIDE, CALCIUM CHLORIDE 600; 310; 30; 20 MG/100ML; MG/100ML; MG/100ML; MG/100ML
20 INJECTION, SOLUTION INTRAVENOUS CONTINUOUS
Status: ACTIVE | OUTPATIENT
Start: 2024-10-31 | End: 2024-11-01

## 2024-10-31 RX ORDER — HYDRALAZINE HYDROCHLORIDE 20 MG/ML
5 INJECTION INTRAMUSCULAR; INTRAVENOUS EVERY 30 MIN PRN
Status: DISCONTINUED | OUTPATIENT
Start: 2024-10-31 | End: 2024-10-31 | Stop reason: HOSPADM

## 2024-10-31 RX ORDER — IBUPROFEN 600 MG/1
600 TABLET ORAL EVERY 8 HOURS SCHEDULED
Status: DISCONTINUED | OUTPATIENT
Start: 2024-10-31 | End: 2024-11-01 | Stop reason: HOSPADM

## 2024-10-31 RX ORDER — ONDANSETRON HYDROCHLORIDE 2 MG/ML
4 INJECTION, SOLUTION INTRAVENOUS ONCE AS NEEDED
Status: DISCONTINUED | OUTPATIENT
Start: 2024-10-31 | End: 2024-10-31 | Stop reason: HOSPADM

## 2024-10-31 RX ORDER — CEFAZOLIN 1 G/1
INJECTION, POWDER, FOR SOLUTION INTRAVENOUS AS NEEDED
Status: DISCONTINUED | OUTPATIENT
Start: 2024-10-31 | End: 2024-10-31

## 2024-10-31 RX ORDER — ROCURONIUM BROMIDE 10 MG/ML
INJECTION, SOLUTION INTRAVENOUS AS NEEDED
Status: DISCONTINUED | OUTPATIENT
Start: 2024-10-31 | End: 2024-10-31

## 2024-10-31 RX ORDER — ACETAMINOPHEN 325 MG/1
650 TABLET ORAL EVERY 6 HOURS SCHEDULED
Status: CANCELLED | OUTPATIENT
Start: 2024-10-31

## 2024-10-31 RX ORDER — DROPERIDOL 2.5 MG/ML
0.62 INJECTION, SOLUTION INTRAMUSCULAR; INTRAVENOUS ONCE AS NEEDED
Status: DISCONTINUED | OUTPATIENT
Start: 2024-10-31 | End: 2024-10-31 | Stop reason: HOSPADM

## 2024-10-31 RX ORDER — BISACODYL 5 MG
10 TABLET, DELAYED RELEASE (ENTERIC COATED) ORAL DAILY PRN
Status: CANCELLED | OUTPATIENT
Start: 2024-10-31

## 2024-10-31 RX ORDER — NALOXONE HYDROCHLORIDE 0.4 MG/ML
0.2 INJECTION, SOLUTION INTRAMUSCULAR; INTRAVENOUS; SUBCUTANEOUS EVERY 5 MIN PRN
Status: DISCONTINUED | OUTPATIENT
Start: 2024-10-31 | End: 2024-11-01 | Stop reason: HOSPADM

## 2024-10-31 RX ORDER — ONDANSETRON HYDROCHLORIDE 2 MG/ML
4 INJECTION, SOLUTION INTRAVENOUS EVERY 8 HOURS PRN
Status: DISCONTINUED | OUTPATIENT
Start: 2024-10-31 | End: 2024-11-01 | Stop reason: HOSPADM

## 2024-10-31 RX ORDER — KETOROLAC TROMETHAMINE 30 MG/ML
15 INJECTION, SOLUTION INTRAMUSCULAR; INTRAVENOUS EVERY 6 HOURS
Status: CANCELLED | OUTPATIENT
Start: 2024-10-31 | End: 2024-11-01

## 2024-10-31 RX ORDER — SCOLOPAMINE TRANSDERMAL SYSTEM 1 MG/1
1 PATCH, EXTENDED RELEASE TRANSDERMAL ONCE
Status: CANCELLED | OUTPATIENT
Start: 2024-10-31 | End: 2024-10-31

## 2024-10-31 RX ORDER — PROCHLORPERAZINE EDISYLATE 5 MG/ML
5 INJECTION INTRAMUSCULAR; INTRAVENOUS ONCE AS NEEDED
Status: DISCONTINUED | OUTPATIENT
Start: 2024-10-31 | End: 2024-10-31 | Stop reason: HOSPADM

## 2024-10-31 RX ORDER — POLYETHYLENE GLYCOL 3350 17 G/17G
17 POWDER, FOR SOLUTION ORAL DAILY
Status: CANCELLED | OUTPATIENT
Start: 2024-10-31

## 2024-10-31 RX ORDER — OXYCODONE HYDROCHLORIDE 5 MG/1
10 TABLET ORAL EVERY 4 HOURS PRN
Status: DISCONTINUED | OUTPATIENT
Start: 2024-10-31 | End: 2024-11-01 | Stop reason: HOSPADM

## 2024-10-31 RX ORDER — KETOROLAC TROMETHAMINE 30 MG/ML
15 INJECTION, SOLUTION INTRAMUSCULAR; INTRAVENOUS EVERY 6 HOURS PRN
Status: DISCONTINUED | OUTPATIENT
Start: 2024-10-31 | End: 2024-11-01 | Stop reason: HOSPADM

## 2024-10-31 RX ORDER — BISACODYL 5 MG
10 TABLET, DELAYED RELEASE (ENTERIC COATED) ORAL DAILY PRN
Status: DISCONTINUED | OUTPATIENT
Start: 2024-10-31 | End: 2024-11-01 | Stop reason: HOSPADM

## 2024-10-31 RX ORDER — ASPIRIN 81 MG/1
81 TABLET ORAL 2 TIMES DAILY
Status: CANCELLED | OUTPATIENT
Start: 2024-10-31

## 2024-10-31 RX ORDER — CEFAZOLIN SODIUM 2 G/100ML
2 INJECTION, SOLUTION INTRAVENOUS EVERY 8 HOURS
Status: CANCELLED | OUTPATIENT
Start: 2024-10-31 | End: 2024-11-01

## 2024-10-31 RX ORDER — ONDANSETRON 4 MG/1
4 TABLET, FILM COATED ORAL EVERY 8 HOURS PRN
Status: CANCELLED | OUTPATIENT
Start: 2024-10-31

## 2024-10-31 RX ORDER — SCOLOPAMINE TRANSDERMAL SYSTEM 1 MG/1
1 PATCH, EXTENDED RELEASE TRANSDERMAL ONCE
Status: DISCONTINUED | OUTPATIENT
Start: 2024-10-31 | End: 2024-11-01 | Stop reason: HOSPADM

## 2024-10-31 RX ORDER — PROPOFOL 10 MG/ML
INJECTION, EMULSION INTRAVENOUS AS NEEDED
Status: DISCONTINUED | OUTPATIENT
Start: 2024-10-31 | End: 2024-10-31

## 2024-10-31 RX ORDER — SYRING-NEEDL,DISP,INSUL,0.3 ML 29 G X1/2"
148 SYRINGE, EMPTY DISPOSABLE MISCELLANEOUS ONCE AS NEEDED
Status: CANCELLED | OUTPATIENT
Start: 2024-10-31

## 2024-10-31 RX ORDER — METHOCARBAMOL 100 MG/ML
INJECTION, SOLUTION INTRAMUSCULAR; INTRAVENOUS AS NEEDED
Status: DISCONTINUED | OUTPATIENT
Start: 2024-10-31 | End: 2024-10-31

## 2024-10-31 RX ORDER — PANTOPRAZOLE SODIUM 40 MG/1
40 TABLET, DELAYED RELEASE ORAL
Status: DISCONTINUED | OUTPATIENT
Start: 2024-11-01 | End: 2024-11-01 | Stop reason: HOSPADM

## 2024-10-31 RX ORDER — WATER
100 LIQUID (ML) MISCELLANEOUS
Status: DISCONTINUED | OUTPATIENT
Start: 2024-10-31 | End: 2024-11-01 | Stop reason: HOSPADM

## 2024-10-31 RX ORDER — ONDANSETRON HYDROCHLORIDE 2 MG/ML
4 INJECTION, SOLUTION INTRAVENOUS EVERY 8 HOURS PRN
Status: CANCELLED | OUTPATIENT
Start: 2024-10-31

## 2024-10-31 RX ORDER — PHENYLEPHRINE HCL IN 0.9% NACL 1 MG/10 ML
SYRINGE (ML) INTRAVENOUS AS NEEDED
Status: DISCONTINUED | OUTPATIENT
Start: 2024-10-31 | End: 2024-10-31

## 2024-10-31 RX ORDER — CEFAZOLIN 1 G/1
INJECTION, POWDER, FOR SOLUTION INTRAVENOUS AS NEEDED
Status: DISCONTINUED | OUTPATIENT
Start: 2024-10-31 | End: 2024-10-31 | Stop reason: HOSPADM

## 2024-10-31 RX ORDER — MIDAZOLAM HYDROCHLORIDE 1 MG/ML
INJECTION INTRAMUSCULAR; INTRAVENOUS AS NEEDED
Status: DISCONTINUED | OUTPATIENT
Start: 2024-10-31 | End: 2024-10-31

## 2024-10-31 RX ORDER — ONDANSETRON HYDROCHLORIDE 2 MG/ML
INJECTION, SOLUTION INTRAVENOUS AS NEEDED
Status: DISCONTINUED | OUTPATIENT
Start: 2024-10-31 | End: 2024-10-31

## 2024-10-31 RX ORDER — ACETAMINOPHEN 325 MG/1
650 TABLET ORAL EVERY 4 HOURS PRN
Status: DISCONTINUED | OUTPATIENT
Start: 2024-10-31 | End: 2024-11-01 | Stop reason: HOSPADM

## 2024-10-31 RX ORDER — POLYETHYLENE GLYCOL 3350 17 G/17G
17 POWDER, FOR SOLUTION ORAL DAILY
Status: DISCONTINUED | OUTPATIENT
Start: 2024-10-31 | End: 2024-11-01 | Stop reason: HOSPADM

## 2024-10-31 RX ORDER — SYRING-NEEDL,DISP,INSUL,0.3 ML 29 G X1/2"
148 SYRINGE, EMPTY DISPOSABLE MISCELLANEOUS ONCE AS NEEDED
Status: DISCONTINUED | OUTPATIENT
Start: 2024-10-31 | End: 2024-11-01 | Stop reason: HOSPADM

## 2024-10-31 RX ORDER — FENTANYL CITRATE 50 UG/ML
INJECTION, SOLUTION INTRAMUSCULAR; INTRAVENOUS AS NEEDED
Status: DISCONTINUED | OUTPATIENT
Start: 2024-10-31 | End: 2024-10-31

## 2024-10-31 RX ORDER — TRANEXAMIC ACID 100 MG/ML
INJECTION, SOLUTION INTRAVENOUS AS NEEDED
Status: DISCONTINUED | OUTPATIENT
Start: 2024-10-31 | End: 2024-10-31

## 2024-10-31 RX ORDER — KETOROLAC TROMETHAMINE 30 MG/ML
15 INJECTION, SOLUTION INTRAMUSCULAR; INTRAVENOUS EVERY 6 HOURS
Status: DISCONTINUED | OUTPATIENT
Start: 2024-10-31 | End: 2024-10-31

## 2024-10-31 RX ORDER — MAGNESIUM SULFATE HEPTAHYDRATE 500 MG/ML
INJECTION, SOLUTION INTRAMUSCULAR; INTRAVENOUS AS NEEDED
Status: DISCONTINUED | OUTPATIENT
Start: 2024-10-31 | End: 2024-10-31

## 2024-10-31 RX ORDER — PANTOPRAZOLE SODIUM 40 MG/1
40 TABLET, DELAYED RELEASE ORAL
Status: CANCELLED | OUTPATIENT
Start: 2024-11-01 | End: 2024-11-22

## 2024-10-31 RX ORDER — LIDOCAINE HYDROCHLORIDE 20 MG/ML
INJECTION, SOLUTION INFILTRATION; PERINEURAL AS NEEDED
Status: DISCONTINUED | OUTPATIENT
Start: 2024-10-31 | End: 2024-10-31

## 2024-10-31 RX ORDER — OXYCODONE HYDROCHLORIDE 5 MG/1
5 TABLET ORAL EVERY 6 HOURS PRN
Status: DISCONTINUED | OUTPATIENT
Start: 2024-10-31 | End: 2024-11-01 | Stop reason: HOSPADM

## 2024-10-31 RX ORDER — OXYCODONE HYDROCHLORIDE 5 MG/1
5 TABLET ORAL
Status: DISCONTINUED | OUTPATIENT
Start: 2024-10-31 | End: 2024-10-31 | Stop reason: HOSPADM

## 2024-10-31 RX ORDER — SODIUM CHLORIDE 0.9 G/100ML
IRRIGANT IRRIGATION AS NEEDED
Status: DISCONTINUED | OUTPATIENT
Start: 2024-10-31 | End: 2024-10-31 | Stop reason: HOSPADM

## 2024-10-31 RX ORDER — CEFAZOLIN SODIUM 2 G/100ML
2 INJECTION, SOLUTION INTRAVENOUS ONCE
Status: DISCONTINUED | OUTPATIENT
Start: 2024-10-31 | End: 2024-10-31 | Stop reason: HOSPADM

## 2024-10-31 RX ORDER — CEFAZOLIN SODIUM 2 G/100ML
2 INJECTION, SOLUTION INTRAVENOUS EVERY 8 HOURS
Status: COMPLETED | OUTPATIENT
Start: 2024-10-31 | End: 2024-11-01

## 2024-10-31 RX ORDER — ASPIRIN 81 MG/1
81 TABLET ORAL 2 TIMES DAILY
Status: DISCONTINUED | OUTPATIENT
Start: 2024-10-31 | End: 2024-11-01 | Stop reason: HOSPADM

## 2024-10-31 RX ORDER — ONDANSETRON 4 MG/1
4 TABLET, FILM COATED ORAL EVERY 8 HOURS PRN
Status: DISCONTINUED | OUTPATIENT
Start: 2024-10-31 | End: 2024-11-01 | Stop reason: HOSPADM

## 2024-10-31 RX ORDER — VERAPAMIL HCL 240 MG
240 TABLET, EXTENDED RELEASE ORAL NIGHTLY
COMMUNITY

## 2024-10-31 RX ORDER — SODIUM CHLORIDE, SODIUM LACTATE, POTASSIUM CHLORIDE, CALCIUM CHLORIDE 600; 310; 30; 20 MG/100ML; MG/100ML; MG/100ML; MG/100ML
100 INJECTION, SOLUTION INTRAVENOUS CONTINUOUS
Status: DISCONTINUED | OUTPATIENT
Start: 2024-10-31 | End: 2024-10-31 | Stop reason: HOSPADM

## 2024-10-31 ASSESSMENT — COGNITIVE AND FUNCTIONAL STATUS - GENERAL
CLIMB 3 TO 5 STEPS WITH RAILING: A LITTLE
CLIMB 3 TO 5 STEPS WITH RAILING: TOTAL
MOVING TO AND FROM BED TO CHAIR: A LITTLE
TURNING FROM BACK TO SIDE WHILE IN FLAT BAD: A LITTLE
WALKING IN HOSPITAL ROOM: A LITTLE
MOBILITY SCORE: 20
WALKING IN HOSPITAL ROOM: A LITTLE
MOBILITY SCORE: 16
DAILY ACTIVITIY SCORE: 22
STANDING UP FROM CHAIR USING ARMS: A LITTLE
MOVING FROM LYING ON BACK TO SITTING ON SIDE OF FLAT BED WITH BEDRAILS: A LITTLE
DRESSING REGULAR LOWER BODY CLOTHING: A LITTLE
TURNING FROM BACK TO SIDE WHILE IN FLAT BAD: A LITTLE
HELP NEEDED FOR BATHING: A LITTLE
MOVING TO AND FROM BED TO CHAIR: A LITTLE

## 2024-10-31 ASSESSMENT — PAIN SCALES - GENERAL
PAINLEVEL_OUTOF10: 7
PAINLEVEL_OUTOF10: 5 - MODERATE PAIN
PAINLEVEL_OUTOF10: 7
PAINLEVEL_OUTOF10: 5 - MODERATE PAIN
PAINLEVEL_OUTOF10: 2
PAINLEVEL_OUTOF10: 0 - NO PAIN
PAINLEVEL_OUTOF10: 7
PAINLEVEL_OUTOF10: 5 - MODERATE PAIN
PAINLEVEL_OUTOF10: 5 - MODERATE PAIN

## 2024-10-31 ASSESSMENT — PAIN - FUNCTIONAL ASSESSMENT
PAIN_FUNCTIONAL_ASSESSMENT: 0-10
PAIN_FUNCTIONAL_ASSESSMENT: UNABLE TO SELF-REPORT
PAIN_FUNCTIONAL_ASSESSMENT: 0-10
PAIN_FUNCTIONAL_ASSESSMENT: UNABLE TO SELF-REPORT
PAIN_FUNCTIONAL_ASSESSMENT: 0-10
PAIN_FUNCTIONAL_ASSESSMENT: UNABLE TO SELF-REPORT

## 2024-10-31 ASSESSMENT — PAIN DESCRIPTION - DESCRIPTORS
DESCRIPTORS: ACHING;THROBBING
DESCRIPTORS: THROBBING
DESCRIPTORS: THROBBING
DESCRIPTORS: ACHING;THROBBING
DESCRIPTORS: THROBBING;ACHING
DESCRIPTORS: THROBBING
DESCRIPTORS: THROBBING;ACHING
DESCRIPTORS: THROBBING
DESCRIPTORS: ACHING;THROBBING

## 2024-10-31 ASSESSMENT — COLUMBIA-SUICIDE SEVERITY RATING SCALE - C-SSRS
1. IN THE PAST MONTH, HAVE YOU WISHED YOU WERE DEAD OR WISHED YOU COULD GO TO SLEEP AND NOT WAKE UP?: NO
6. HAVE YOU EVER DONE ANYTHING, STARTED TO DO ANYTHING, OR PREPARED TO DO ANYTHING TO END YOUR LIFE?: NO
6. HAVE YOU EVER DONE ANYTHING, STARTED TO DO ANYTHING, OR PREPARED TO DO ANYTHING TO END YOUR LIFE?: NO
2. HAVE YOU ACTUALLY HAD ANY THOUGHTS OF KILLING YOURSELF?: NO
1. IN THE PAST MONTH, HAVE YOU WISHED YOU WERE DEAD OR WISHED YOU COULD GO TO SLEEP AND NOT WAKE UP?: NO
2. HAVE YOU ACTUALLY HAD ANY THOUGHTS OF KILLING YOURSELF?: NO

## 2024-10-31 ASSESSMENT — ACTIVITIES OF DAILY LIVING (ADL): ADL_ASSISTANCE: INDEPENDENT

## 2024-10-31 ASSESSMENT — PAIN DESCRIPTION - ORIENTATION: ORIENTATION: LEFT

## 2024-10-31 ASSESSMENT — PAIN SCALES - PAIN ASSESSMENT IN ADVANCED DEMENTIA (PAINAD): TOTALSCORE: MEDICATION (SEE MAR)

## 2024-11-01 ENCOUNTER — APPOINTMENT (OUTPATIENT)
Dept: RHEUMATOLOGY | Facility: CLINIC | Age: 64
End: 2024-11-01
Payer: COMMERCIAL

## 2024-11-01 ENCOUNTER — APPOINTMENT (OUTPATIENT)
Dept: CARDIOLOGY | Facility: HOSPITAL | Age: 64
End: 2024-11-01
Payer: COMMERCIAL

## 2024-11-01 ENCOUNTER — DOCUMENTATION (OUTPATIENT)
Dept: HOME HEALTH SERVICES | Facility: HOME HEALTH | Age: 64
End: 2024-11-01

## 2024-11-01 ENCOUNTER — PHARMACY VISIT (OUTPATIENT)
Dept: PHARMACY | Facility: CLINIC | Age: 64
End: 2024-11-01
Payer: COMMERCIAL

## 2024-11-01 VITALS
BODY MASS INDEX: 29.51 KG/M2 | OXYGEN SATURATION: 100 % | HEIGHT: 73 IN | HEART RATE: 77 BPM | RESPIRATION RATE: 17 BRPM | TEMPERATURE: 99.4 F | DIASTOLIC BLOOD PRESSURE: 65 MMHG | SYSTOLIC BLOOD PRESSURE: 134 MMHG | WEIGHT: 222.66 LBS

## 2024-11-01 LAB
ERYTHROCYTE [DISTWIDTH] IN BLOOD BY AUTOMATED COUNT: 13.5 % (ref 11.5–14.5)
HCT VFR BLD AUTO: 30.7 % (ref 41–52)
HGB BLD-MCNC: 9.8 G/DL (ref 13.5–17.5)
HOLD SPECIMEN: NORMAL
MCH RBC QN AUTO: 27.7 PG (ref 26–34)
MCHC RBC AUTO-ENTMCNC: 31.9 G/DL (ref 32–36)
MCV RBC AUTO: 87 FL (ref 80–100)
NRBC BLD-RTO: 0 /100 WBCS (ref 0–0)
PLATELET # BLD AUTO: 278 X10*3/UL (ref 150–450)
RBC # BLD AUTO: 3.54 X10*6/UL (ref 4.5–5.9)
WBC # BLD AUTO: 12 X10*3/UL (ref 4.4–11.3)

## 2024-11-01 PROCEDURE — 97110 THERAPEUTIC EXERCISES: CPT | Mod: GP,CQ | Performed by: PHYSICAL THERAPY ASSISTANT

## 2024-11-01 PROCEDURE — 7100000011 HC EXTENDED STAY RECOVERY HOURLY - NURSING UNIT

## 2024-11-01 PROCEDURE — 36415 COLL VENOUS BLD VENIPUNCTURE: CPT | Performed by: STUDENT IN AN ORGANIZED HEALTH CARE EDUCATION/TRAINING PROGRAM

## 2024-11-01 PROCEDURE — 85027 COMPLETE CBC AUTOMATED: CPT | Performed by: STUDENT IN AN ORGANIZED HEALTH CARE EDUCATION/TRAINING PROGRAM

## 2024-11-01 PROCEDURE — 2500000001 HC RX 250 WO HCPCS SELF ADMINISTERED DRUGS (ALT 637 FOR MEDICARE OP): Performed by: STUDENT IN AN ORGANIZED HEALTH CARE EDUCATION/TRAINING PROGRAM

## 2024-11-01 PROCEDURE — 93005 ELECTROCARDIOGRAM TRACING: CPT

## 2024-11-01 PROCEDURE — 97530 THERAPEUTIC ACTIVITIES: CPT | Mod: GP,CQ | Performed by: PHYSICAL THERAPY ASSISTANT

## 2024-11-01 PROCEDURE — 97116 GAIT TRAINING THERAPY: CPT | Mod: GP,CQ | Performed by: PHYSICAL THERAPY ASSISTANT

## 2024-11-01 PROCEDURE — 27447 TOTAL KNEE ARTHROPLASTY: CPT | Performed by: STUDENT IN AN ORGANIZED HEALTH CARE EDUCATION/TRAINING PROGRAM

## 2024-11-01 PROCEDURE — 2500000004 HC RX 250 GENERAL PHARMACY W/ HCPCS (ALT 636 FOR OP/ED): Mod: JZ | Performed by: STUDENT IN AN ORGANIZED HEALTH CARE EDUCATION/TRAINING PROGRAM

## 2024-11-01 RX ORDER — GABAPENTIN 600 MG/1
TABLET ORAL
COMMUNITY

## 2024-11-01 RX ORDER — GABAPENTIN 300 MG/1
CAPSULE ORAL
COMMUNITY
Start: 2024-09-26

## 2024-11-01 SDOH — ECONOMIC STABILITY: INCOME INSECURITY: IN THE PAST 12 MONTHS HAS THE ELECTRIC, GAS, OIL, OR WATER COMPANY THREATENED TO SHUT OFF SERVICES IN YOUR HOME?: NO

## 2024-11-01 SDOH — HEALTH STABILITY: MENTAL HEALTH: HOW OFTEN DO YOU HAVE A DRINK CONTAINING ALCOHOL?: NEVER

## 2024-11-01 SDOH — ECONOMIC STABILITY: FOOD INSECURITY: WITHIN THE PAST 12 MONTHS, YOU WORRIED THAT YOUR FOOD WOULD RUN OUT BEFORE YOU GOT THE MONEY TO BUY MORE.: NEVER TRUE

## 2024-11-01 SDOH — ECONOMIC STABILITY: HOUSING INSECURITY: IN THE PAST 12 MONTHS, HOW MANY TIMES HAVE YOU MOVED WHERE YOU WERE LIVING?: 0

## 2024-11-01 SDOH — ECONOMIC STABILITY: HOUSING INSECURITY: IN THE LAST 12 MONTHS, WAS THERE A TIME WHEN YOU WERE NOT ABLE TO PAY THE MORTGAGE OR RENT ON TIME?: NO

## 2024-11-01 SDOH — ECONOMIC STABILITY: HOUSING INSECURITY: AT ANY TIME IN THE PAST 12 MONTHS, WERE YOU HOMELESS OR LIVING IN A SHELTER (INCLUDING NOW)?: NO

## 2024-11-01 SDOH — HEALTH STABILITY: MENTAL HEALTH: HOW OFTEN DO YOU HAVE SIX OR MORE DRINKS ON ONE OCCASION?: NEVER

## 2024-11-01 SDOH — ECONOMIC STABILITY: TRANSPORTATION INSECURITY: IN THE PAST 12 MONTHS, HAS LACK OF TRANSPORTATION KEPT YOU FROM MEDICAL APPOINTMENTS OR FROM GETTING MEDICATIONS?: NO

## 2024-11-01 SDOH — ECONOMIC STABILITY: FOOD INSECURITY: WITHIN THE PAST 12 MONTHS, THE FOOD YOU BOUGHT JUST DIDN'T LAST AND YOU DIDN'T HAVE MONEY TO GET MORE.: NEVER TRUE

## 2024-11-01 SDOH — ECONOMIC STABILITY: FOOD INSECURITY: HOW HARD IS IT FOR YOU TO PAY FOR THE VERY BASICS LIKE FOOD, HOUSING, MEDICAL CARE, AND HEATING?: NOT HARD AT ALL

## 2024-11-01 SDOH — HEALTH STABILITY: MENTAL HEALTH: HOW MANY DRINKS CONTAINING ALCOHOL DO YOU HAVE ON A TYPICAL DAY WHEN YOU ARE DRINKING?: PATIENT DOES NOT DRINK

## 2024-11-01 ASSESSMENT — LIFESTYLE VARIABLES
AUDIT-C TOTAL SCORE: 0
SKIP TO QUESTIONS 9-10: 1

## 2024-11-01 ASSESSMENT — COGNITIVE AND FUNCTIONAL STATUS - GENERAL
MOVING FROM LYING ON BACK TO SITTING ON SIDE OF FLAT BED WITH BEDRAILS: A LITTLE
MOVING TO AND FROM BED TO CHAIR: A LITTLE
CLIMB 3 TO 5 STEPS WITH RAILING: A LITTLE
TOILETING: A LITTLE
STANDING UP FROM CHAIR USING ARMS: A LITTLE
STANDING UP FROM CHAIR USING ARMS: A LITTLE
MOVING TO AND FROM BED TO CHAIR: A LITTLE
MOBILITY SCORE: 21
DRESSING REGULAR UPPER BODY CLOTHING: A LITTLE
MOBILITY SCORE: 18
TURNING FROM BACK TO SIDE WHILE IN FLAT BAD: A LITTLE
WALKING IN HOSPITAL ROOM: A LITTLE
CLIMB 3 TO 5 STEPS WITH RAILING: A LITTLE

## 2024-11-01 ASSESSMENT — PAIN SCALES - GENERAL
PAINLEVEL_OUTOF10: 2
PAINLEVEL_OUTOF10: 1
PAINLEVEL_OUTOF10: 4
PAINLEVEL_OUTOF10: 3

## 2024-11-01 ASSESSMENT — PAIN - FUNCTIONAL ASSESSMENT
PAIN_FUNCTIONAL_ASSESSMENT: 0-10

## 2024-11-01 ASSESSMENT — ACTIVITIES OF DAILY LIVING (ADL): LACK_OF_TRANSPORTATION: NO

## 2024-11-02 ENCOUNTER — HOME CARE VISIT (OUTPATIENT)
Dept: HOME HEALTH SERVICES | Facility: HOME HEALTH | Age: 64
End: 2024-11-02
Payer: COMMERCIAL

## 2024-11-02 VITALS
SYSTOLIC BLOOD PRESSURE: 138 MMHG | OXYGEN SATURATION: 100 % | DIASTOLIC BLOOD PRESSURE: 80 MMHG | HEART RATE: 58 BPM | TEMPERATURE: 98.1 F

## 2024-11-02 PROCEDURE — G0151 HHCP-SERV OF PT,EA 15 MIN: HCPCS

## 2024-11-02 ASSESSMENT — ENCOUNTER SYMPTOMS
PAIN LOCATION - PAIN SEVERITY: 3/10
PAIN LOCATION - EXACERBATING FACTORS: EXERCISES
PAIN LOCATION - PAIN FREQUENCY: CONSTANT
PERSON REPORTING PAIN: PATIENT
PAIN LOCATION - PAIN QUALITY: ACHE
PAIN LOCATION: LEFT HIP
PAIN LOCATION - RELIEVING FACTORS: PAIN MEDS, ICE
PAIN: 1

## 2024-11-02 ASSESSMENT — ACTIVITIES OF DAILY LIVING (ADL)
AMBULATION ASSISTANCE ON FLAT SURFACES: 1
OASIS_M1830: 03
ENTERING_EXITING_HOME: ONE PERSON

## 2024-11-04 ENCOUNTER — TELEPHONE (OUTPATIENT)
Dept: ORTHOPEDIC SURGERY | Facility: HOSPITAL | Age: 64
End: 2024-11-04
Payer: COMMERCIAL

## 2024-11-04 ENCOUNTER — HOME CARE VISIT (OUTPATIENT)
Dept: HOME HEALTH SERVICES | Facility: HOME HEALTH | Age: 64
End: 2024-11-04
Payer: COMMERCIAL

## 2024-11-04 VITALS
SYSTOLIC BLOOD PRESSURE: 134 MMHG | TEMPERATURE: 98.5 F | DIASTOLIC BLOOD PRESSURE: 78 MMHG | OXYGEN SATURATION: 98 % | HEART RATE: 71 BPM

## 2024-11-04 PROCEDURE — G0151 HHCP-SERV OF PT,EA 15 MIN: HCPCS

## 2024-11-04 ASSESSMENT — ENCOUNTER SYMPTOMS
PAIN LOCATION - EXACERBATING FACTORS: HEP
PERSON REPORTING PAIN: PATIENT
PAIN LOCATION - PAIN SEVERITY: 3/10
PAIN LOCATION - RELIEVING FACTORS: ICE, PAIN MEDS
PAIN LOCATION: LEFT HIP
PAIN LOCATION - PAIN FREQUENCY: INTERMITTENT
PAIN: 1

## 2024-11-04 NOTE — TELEPHONE ENCOUNTER
Spoke with patient 's care taker Lesly during follow-up phone call.  She indicates that the patient is doing well and pain is under control.  Care taker denies questions related to discharge instructions or homegoing medications.  Care taker is aware of follow up visit with surgeon's office.  Home Care has established a first visit with patient.   Care taker denies addtional questions or concerns at this time and verbalizes understanding to call RN Navigator or Surgeon's office with any new or worsening symptoms.

## 2024-11-04 NOTE — TELEPHONE ENCOUNTER
I received a call from the care taker of Otto Yen. He is expressing that he feels like his left knee is going to give out on him. It is hurting, not swollen. He always has someone with him and did not notice him twisting. Therapy was there today and wants to have him walk more naturally. Care taker said he has not c/o of any hip pain. She says he rarely c/o discomfort. I recommended ice and medication to help with pain. Dr. Menchaca notified.

## 2024-11-06 ENCOUNTER — HOME CARE VISIT (OUTPATIENT)
Dept: HOME HEALTH SERVICES | Facility: HOME HEALTH | Age: 64
End: 2024-11-06
Payer: COMMERCIAL

## 2024-11-06 PROCEDURE — G0152 HHCP-SERV OF OT,EA 15 MIN: HCPCS | Performed by: OCCUPATIONAL THERAPIST

## 2024-11-06 ASSESSMENT — ACTIVITIES OF DAILY LIVING (ADL)
DRESSING_UB_CURRENT_FUNCTION: SUPERVISION
DRESSING_LB_CURRENT_FUNCTION: SUPERVISION
TOILETING: 1
AMBULATION ASSISTANCE: CONTACT GUARD ASSIST
CURRENT_FUNCTION: STAND BY ASSIST
BATHING ASSESSED: 1
AMBULATION ASSISTANCE: 1
BATHING_CURRENT_FUNCTION: MINIMUM ASSIST
TOILETING: MINIMUM ASSIST
PHYSICAL TRANSFERS ASSESSED: 1

## 2024-11-06 ASSESSMENT — ENCOUNTER SYMPTOMS
HIGHEST PAIN SEVERITY IN PAST 24 HOURS: 3/10
PERSON REPORTING PAIN: PATIENT
SUBJECTIVE PAIN PROGRESSION: UNCHANGED
LOWEST PAIN SEVERITY IN PAST 24 HOURS: 2/10
PAIN LOCATION: LEFT HIP
PAIN: 1
PAIN SEVERITY GOAL: 1/10

## 2024-11-07 ENCOUNTER — HOME CARE VISIT (OUTPATIENT)
Dept: HOME HEALTH SERVICES | Facility: HOME HEALTH | Age: 64
End: 2024-11-07
Payer: COMMERCIAL

## 2024-11-07 VITALS
TEMPERATURE: 98.3 F | DIASTOLIC BLOOD PRESSURE: 80 MMHG | OXYGEN SATURATION: 99 % | SYSTOLIC BLOOD PRESSURE: 130 MMHG | HEART RATE: 62 BPM

## 2024-11-07 PROCEDURE — G0151 HHCP-SERV OF PT,EA 15 MIN: HCPCS

## 2024-11-07 ASSESSMENT — ENCOUNTER SYMPTOMS
PAIN LOCATION - RELIEVING FACTORS: ICE
PERSON REPORTING PAIN: PATIENT
PAIN: 1
PAIN LOCATION: LEFT HIP
PAIN LOCATION - PAIN SEVERITY: 3/10

## 2024-11-11 ENCOUNTER — HOME CARE VISIT (OUTPATIENT)
Dept: HOME HEALTH SERVICES | Facility: HOME HEALTH | Age: 64
End: 2024-11-11
Payer: COMMERCIAL

## 2024-11-11 VITALS
DIASTOLIC BLOOD PRESSURE: 72 MMHG | TEMPERATURE: 97.5 F | SYSTOLIC BLOOD PRESSURE: 124 MMHG | OXYGEN SATURATION: 99 % | HEART RATE: 72 BPM

## 2024-11-11 PROCEDURE — G0151 HHCP-SERV OF PT,EA 15 MIN: HCPCS

## 2024-11-11 ASSESSMENT — ENCOUNTER SYMPTOMS
PERSON REPORTING PAIN: PATIENT
DENIES PAIN: 1
HIGHEST PAIN SEVERITY IN PAST 24 HOURS: 2/10

## 2024-11-14 ENCOUNTER — HOME CARE VISIT (OUTPATIENT)
Dept: HOME HEALTH SERVICES | Facility: HOME HEALTH | Age: 64
End: 2024-11-14
Payer: COMMERCIAL

## 2024-11-14 VITALS
OXYGEN SATURATION: 100 % | TEMPERATURE: 98 F | SYSTOLIC BLOOD PRESSURE: 114 MMHG | DIASTOLIC BLOOD PRESSURE: 64 MMHG | HEART RATE: 60 BPM

## 2024-11-14 PROCEDURE — G0151 HHCP-SERV OF PT,EA 15 MIN: HCPCS

## 2024-11-14 ASSESSMENT — ACTIVITIES OF DAILY LIVING (ADL)
HOME_HEALTH_OASIS: 00
OASIS_M1830: 01

## 2024-11-14 ASSESSMENT — ENCOUNTER SYMPTOMS
PERSON REPORTING PAIN: PATIENT
DENIES PAIN: 1

## 2024-11-14 NOTE — PROGRESS NOTES
Kiara Menchaca MD   Adult Reconstruction and Joint Replacement Surgery  Phone: [phone]     Fax: [phone]       Name: Otto Yen  Age: 64 y.o.   : 1960   Date of Visit: 2024    HIP REPLACEMENT POST-OP VISIT     Date of Surgery: 10/31/2024  Procedure: Left direct anterior total hip replacement  Diagnosis: Left hip arthritis, left hip dysplasia, left femoral acetabular impingement    Chief Complaint: Left hip replacement surgery follow-up.    History of Present Illness:    The patient is now 2 weeks 4 days status post left direct anterior total hip replacement surgery.    The patient is doing home physical therapy and is progressing well.  He will be starting outpatient physical therapy in the next week.    Denies fevers or drainage from the incision.    Patient is walking with walker or cane for assistive device.    The patient has no fevers or drainage from the incision.    The patient is currently taking over-the-counter medications for pain.     The patient is currently taking aspirin for DVT prophylaxis.    There are no concerns.    Physical Exam:    The patient is well appearing, alert and oriented to person, place and time.    The incision is well healed.  There is no sign of wound complication.    There is no tenderness over the greater trochanter.    Range of motion is excellent and strength is improving.     There is no instability of the joint.    Homans sign is negative.    Neurologic and vascular examinations of the distal extremity are normal.     PROMs  Koos Jr-Knee Injury And Osteoarthritis Outcome Score For Joint Replacement    2024  8:59 PM EDT - Filed by Patient   Instructions    The following question concerns the amount of joint stiffness you have experienced during the last week in your knee. Stiffness is a sensation of restriction or slowness in the ease with which you move your knee joint.   How severe is your knee stiffness after first wakening in the  morning? Mild   What amount of knee pain have you experienced the last week during the following activities?   Twisting/pivoting on your knee Mild   Straightening knee fully Mild   Going up or down stairs Mild   Standing upright Mild   The following questions concern your physical function. By this we mean your ability to move around and to look after yourself. For each of the following activities please indicate the degree of difficulty you have experienced in the last week due to your knee.   Rising from sitting Moderate   Bending to floor/ an object Moderate   Koos Jr Scoring (range: 0 - 100) 63.78     Hoos Jr-Hip Disability And Osteoarthritis Outcome Score For Joint Replacement    8/19/2024  9:00 PM EDT - Filed by Patient   Instructions    What amount of hip pain have you experienced the last week during the following activities?   Going up or down stairs Moderate   Walking on an uneven surface Mild   The following questions concern your physical function. By this we mean your ability to move around and to look after yourself. For each of the following activities please indicate the degree of difficulty you have experienced in the last week due to your hip.   Rising from sitting Moderate   Bending to floor/ an object Moderate   Lying in bed (turning over, maintaining hip position) None   Sitting Mild   Hoos Jr Scoring (range: 0 - 100) 64.66     [facility] 12 Item Health Survey (Vr-12)    7/25/2024 10:09 AM EDT - Filed by Jitendra Talavera RN   In general, would you say your health is: Good   The following questions are about activities you might do during a typical day. Does your health now limit you in these activities?  If so, how much?   Moderate activities, such as moving a table, pushing a vacuum , bowling, or playing golf? Yes, Limited A Little   Climbing several flights of stairs? Yes, Limited A Lot   During the past 4 weeks, have you had any of the following problems with your  work or other regular daily activities as a result of your physical health?   Accomplished less than you would like. Yes, A Little Of The Time   Were limited in the kind of work or other activities. Yes, A Little Of The Time   During the past 4 weeks, have you had any of the following problems with your work or other regular daily activities as a result of any emotional problems (such as feeling depressed or anxious)?   Accomplished less than you would like Yes, Some Of The Time   Didn't do work or other activities as carefully as usual Yes, Some Of The Time   During the past 4 weeks, how much did pain interfere with your normal work (including both work outside the home and house work)? Moderately   How much of the time during the past 4 weeks:   Have you felt calm and peaceful? A Good Bit Of The Time   Did you have a lot of energy? A Little Of The Time   Have you felt downhearted and blue? Some Of The Time   During the past 4 weeks, how much of the time has your physical health or emotional problems interfered with your social activities (like visiting with friends, relatives, etc.)? Some Of The Time   Compared to one year ago, how would you rate your physical health in general now? Slightly Worse   Compared to one year ago, how would you rate your emotional problems (such as feeling anxious, depressed or irritable) now? Slightly Worse   Ort Vr-12 Question Pcs (range: 0 - 100) 37.22   Ort Vr-12 Question McS (range: 0 - 100) 38.6           Imaging:    Radiographs were personally reviewed today. The implants are in good position with no evidence of complication. There have been no significant interval changes.    Impression and Plan:  This patient is 2 weeks 4 days status post left direct anterior total hip replacement surgery.     The patient is doing well following total hip replacement surgery.  Antibiotic prophylaxis prior to dental procedures were reviewed.  Hip precautions were reviewed.  Long-term failure  mechanisms were reviewed.  The patient was asked to contact the office sooner if there are any concerns. A new physical therapy prescription was given and exercises reviewed with the patient in the office. Their questions were answered.     RTC: 6 weeks      X-rays at next visit: Postop BILLIE series     _____________________  Kiara Menchaca MD   Attending Orthopaedic Surgeon  [facility]    [facility]    This office note was transcribed with dictation software.  Please excuse any typographical errors, program misunderstandings leading to inadvertent insertions or deletions of inappropriate wording, pronoun errors and other unintentional transcription errors not noticed on proof-reading.

## 2024-11-15 ENCOUNTER — APPOINTMENT (OUTPATIENT)
Dept: PHYSICAL THERAPY | Facility: CLINIC | Age: 64
End: 2024-11-15
Payer: COMMERCIAL

## 2024-11-18 ENCOUNTER — OFFICE VISIT (OUTPATIENT)
Dept: ORTHOPEDIC SURGERY | Facility: HOSPITAL | Age: 64
End: 2024-11-18
Payer: COMMERCIAL

## 2024-11-18 ENCOUNTER — APPOINTMENT (OUTPATIENT)
Dept: PHYSICAL THERAPY | Facility: CLINIC | Age: 64
End: 2024-11-18
Payer: COMMERCIAL

## 2024-11-18 DIAGNOSIS — Z96.642 STATUS POST LEFT HIP REPLACEMENT: Primary | ICD-10-CM

## 2024-11-18 PROCEDURE — 99211 OFF/OP EST MAY X REQ PHY/QHP: CPT | Performed by: STUDENT IN AN ORGANIZED HEALTH CARE EDUCATION/TRAINING PROGRAM

## 2024-11-18 ASSESSMENT — PAIN - FUNCTIONAL ASSESSMENT: PAIN_FUNCTIONAL_ASSESSMENT: NO/DENIES PAIN

## 2024-11-20 ENCOUNTER — EVALUATION (OUTPATIENT)
Dept: PHYSICAL THERAPY | Facility: CLINIC | Age: 64
End: 2024-11-20
Payer: COMMERCIAL

## 2024-11-20 DIAGNOSIS — M16.12 UNILATERAL PRIMARY OSTEOARTHRITIS, LEFT HIP: Primary | ICD-10-CM

## 2024-11-20 PROCEDURE — 97110 THERAPEUTIC EXERCISES: CPT | Mod: GP | Performed by: PHYSICAL THERAPIST

## 2024-11-20 PROCEDURE — 97161 PT EVAL LOW COMPLEX 20 MIN: CPT | Mod: GP | Performed by: PHYSICAL THERAPIST

## 2024-11-20 ASSESSMENT — PAIN SCALES - GENERAL: PAINLEVEL_OUTOF10: 2

## 2024-11-20 ASSESSMENT — PAIN - FUNCTIONAL ASSESSMENT: PAIN_FUNCTIONAL_ASSESSMENT: 0-10

## 2024-11-20 NOTE — PROGRESS NOTES
Physical Therapy Evaluation    Patient Name: Otto Yen  MRN: [id number]  Today's Date: 11/20/2024  Time Calculation  Start Time: 0939  Stop Time: 1020  Time Calculation (min): 41 min    Visit #: 1.  23 total in 2024. Visits approved: 60/yr.  Certification dates: 11/20/24-2/10/25.    Precautions:  Precautions  Precautions Comment: Per protocol    Subjective/History         ** Patient accompanied by caregiver who plans to assist him with HEP.    DOI: 10/31/24.  Mechanism of injury: Left anterior BILLIE. Had right BILLIE (anterior) on 8/20/24.  Area of symptoms:  Mild (2/10) ache at ant and lat left hip.Pain Assessment: 0-10  0-10 (Numeric) Pain Score: 2. No right hip pain.  Aggravating factors: Walk few min. Stand ~10 min.  Don pants. Don socks. Independent with dressing.  Easing factors: Gabapentin. Ice.   Red flags: None.   Occupation: Production control- walking.   Recreation/Hobbies/Sports: Would like to ride a bike- has not tried in 3 yrs.  Living situation: 2-story home- able to avoid stairs.  Barriers to treatment: Hydrocephalus with memory issues.  Preferred language: English    Objective Findings  Observation/gait: Arrived using 2WW correctly. Min bilat decr HE in stance. Brought quad cane that he uses ~5 min at a time in the house.  Gait with quad cane: Demo's correct cane placement, min decr HE in stance bilat- steady (~70' with min fatigue).  PROM HF: ~90- p1. HE: ~-5- tight. Abd: ~25 deg- tight.   Muscle activation/Resisted testing: Glute med: 4/5 right. 3+/5 left.   Left SLS: ~12 sec w/o UE contact- LOB.      Treatment:   Therex: Modified Otto stretch with left foot on bed/plinth- HEP 30 min, 3x/day. Left SLS with min UE assist- HEP 20 sec, 3x/day. Standing march, standing hip abd, mini squats, 2-leg bridge- all to HEP 10x, 3x/day.     Gait: Instructed to use walker for all ambulation outside of home and when feeling fatigued in the home.     Assessment  Patient presents with gait deviations and decr ROM  consistent with BILLIE.    Plan  Physical therapy 1-2 times/week for 8-10 wks. Therapy may include the following: Therapeutic exercise, manual therapy, education/home program, gait training.     Goals: Walk 20 min for exer w/o pain. Stand ~20 min for meal prep w/o pain.  Don pants and socks w/o pain. Return to work reg duty.

## 2024-11-20 NOTE — LETTER
November 20, 2024    Jesse Ricketts, PT  [address] [facility] [address]  [facility] [address]    Patient: Otto Yen   YOB: 1960   Date of Visit: 11/20/2024       Dear Kiara Menchaca MD  [address]  [place] [address]    The attached plan of care is being sent to you because your patient’s medical reimbursement requires that you certify the plan of care. Your signature is required to allow uninterrupted insurance coverage.      You may indicate your approval by signing below and faxing this form back to us at Dept Fax: [phone].    Please call Dept: [phone] with any questions or concerns.    Thank you for this referral,        Jesse Ricketts PT  [facility] – [place], [place] [address] [facility]  [address] [facility] [id number] [phone]    Payer: Payor: [employer] / Plan: [employer] HEALTH PLAN / Product Type: *No Product type* /                                                                         Date:     Dear Jesse Ricketts PT,     Re: Mr. Otto Yen, MRN:[id number]    I certify that I have reviewed the attached plan of care and it is medically necessary for Mr. Otto Yen (1960) who is under my care.          ______________________________________                    _________________  Provider name and credentials                                           Date and time                                                                                           Plan of Care 11/20/24   Effective from: 11/20/2024  Effective to: 2/10/2025    Plan ID: [id number]            Participants as of Finalize on 11/20/2024    Name Type Comments Contact Info    Kiara Menchaca MD Referring Provider  [phone]    Jesse Ricketts PT Physical Therapist  [phone]       Last Plan Note     Author: Jesse Ricketts PT Status: Incomplete Last edited: 11/20/2024  9:30 AM       Physical Therapy Evaluation    Patient Name: Otto Yen  MRN: [id number]  Today's Date:  11/20/2024  Time Calculation  Start Time: 0939  Stop Time: 1020  Time Calculation (min): 41 min    Visit #: 1.  23 total in 2024. Visits approved: 60/yr.  Certification dates: 11/20/24-2/10/25.    Precautions:  Precautions  Precautions Comment: Per protocol    Subjective/History         ** Patient accompanied by caregiver who plans to assist him with HEP.    DOI: 10/31/24.  Mechanism of injury: Left anterior BILLIE. Had right BILLIE (anterior) on 8/20/24.  Area of symptoms:  Mild (2/10) ache at ant and lat left hip.Pain Assessment: 0-10  0-10 (Numeric) Pain Score: 2. No right hip pain.  Aggravating factors: Walk few min. Stand ~10 min.  Don pants. Don socks. Independent with dressing.  Easing factors: Gabapentin. Ice.   Red flags: None.   Occupation: Production control- walking.   Recreation/Hobbies/Sports: Would like to ride a bike- has not tried in 3 yrs.  Living situation: 2-story home- able to avoid stairs.  Barriers to treatment: Hydrocephalus with memory issues.  Preferred language: English    Objective Findings  Observation/gait: Arrived using 2WW correctly. Min bilat decr HE in stance. Brought quad cane that he uses ~5 min at a time in the house.  Gait with quad cane: Demo's correct cane placement, min decr HE in stance bilat- steady (~70' with min fatigue).  PROM HF: ~90- p1. HE: ~-5- tight. Abd: ~25 deg- tight.   Muscle activation/Resisted testing: Glute med: 4/5 right. 3+/5 left.   Left SLS: ~12 sec w/o UE contact- LOB.      Treatment:   Therex: Modified Otto stretch with left foot on bed/plinth- HEP 30 min, 3x/day. Left SLS with min UE assist- HEP 20 sec, 3x/day. Standing march, standing hip abd, mini squats, 2-leg bridge- all to HEP 10x, 3x/day.     Gait: Instructed to use walker for all ambulation outside of home and when feeling fatigued in the home.     Assessment  Patient presents with gait deviations and decr ROM consistent with BILLIE.    Plan  Physical therapy 1-2 times/week for 8-10 wks. Therapy may  include the following: Therapeutic exercise, manual therapy, education/home program, gait training.     Goals: Walk 20 min for exer w/o pain. Stand ~20 min for meal prep w/o pain.  Don pants and socks w/o pain. Return to work reg duty.         Current Participants as of 11/20/2024    Name Type Comments Contact Info    Kiara Menchaca MD Referring Provider  [phone]    Signature pending    Jesse Ricketts PT Physical Therapist  [phone]    Signature pending

## 2024-11-22 ENCOUNTER — TREATMENT (OUTPATIENT)
Dept: PHYSICAL THERAPY | Facility: CLINIC | Age: 64
End: 2024-11-22
Payer: COMMERCIAL

## 2024-11-22 DIAGNOSIS — M16.12 UNILATERAL PRIMARY OSTEOARTHRITIS, LEFT HIP: Primary | ICD-10-CM

## 2024-11-22 DIAGNOSIS — M16.11 UNILATERAL PRIMARY OSTEOARTHRITIS, RIGHT HIP: ICD-10-CM

## 2024-11-22 PROCEDURE — 97140 MANUAL THERAPY 1/> REGIONS: CPT | Mod: GP | Performed by: PHYSICAL THERAPIST

## 2024-11-22 PROCEDURE — 97110 THERAPEUTIC EXERCISES: CPT | Mod: GP | Performed by: PHYSICAL THERAPIST

## 2024-11-22 NOTE — PROGRESS NOTES
Physical Therapy Follow-up    Patient Name: Otto Yen  MRN: [id number]  Today's Date: 11/22/2024  Time Calculation  Start Time: 0418  Stop Time: 0500  Time Calculation (min): 42 min      Visit#: 2. 28 left in 2024.  Cert dates: NA    Precautions: Hydrocephalus. Low fall risk.    Subjective:  Decr left hip pain, no recent right hip pain. Walking better. HEP going well.      Objective:   PROM right HF: ~100- stiff. HE: 10- stiff.   Gait: using quad cane correctly. Mild decr left hip ext in stance.    Treatment:   Therex: Modified Otto stretch with foot on bed, 2-leg bridge, standing hip ext, SLS, Standing hip flexor stretch, standing march, standing hip ext (gentle), standing abd- all demo'd correctly.      Gait: Practiced repeated step-through with min UE support- improved HE in stance. Walked 2x~100' with quad cane- practiced incr HE in stance. Instructed to continue with walker outside of house.    Manual: IV- hip flex, IR. IV-- HE  // PROM hip flex: ~105. HE: ~10 deg.    Assessment: Incr ROM with rx. Gait improving.     Plan: Incr gait training, LE stabilization exer.

## 2024-11-25 ENCOUNTER — APPOINTMENT (OUTPATIENT)
Dept: PHYSICAL THERAPY | Facility: CLINIC | Age: 64
End: 2024-11-25
Payer: COMMERCIAL

## 2024-11-26 ENCOUNTER — APPOINTMENT (OUTPATIENT)
Dept: PHYSICAL THERAPY | Facility: CLINIC | Age: 64
End: 2024-11-26
Payer: COMMERCIAL

## 2024-11-26 DIAGNOSIS — M16.11 UNILATERAL PRIMARY OSTEOARTHRITIS, RIGHT HIP: ICD-10-CM

## 2024-11-26 PROCEDURE — 97140 MANUAL THERAPY 1/> REGIONS: CPT | Mod: GP | Performed by: PHYSICAL THERAPIST

## 2024-11-26 PROCEDURE — 97110 THERAPEUTIC EXERCISES: CPT | Mod: GP | Performed by: PHYSICAL THERAPIST

## 2024-11-26 NOTE — PROGRESS NOTES
"Physical Therapy Follow-up    Patient Name: Otto Yen  MRN: [id number]  Today's Date: 11/26/2024  Time Calculation  Start Time: 0445  Stop Time: 0530  Time Calculation (min): 45 min      Visit#: 3. 27 left in 2024.  Cert dates: NA    Precautions: Hydrocephalus. Low fall risk.    Subjective:  Decr left hip pain, no recent right hip pain. Walking better. HEP going well.      Objective:   PROM right HF: ~100- stiff. HE: 10- stiff.   Gait: using quad cane correctly. Mild decr left hip ext in stance.    Treatment:   Therex: Modified Otto stretch with foot on bed, 2-leg bridge, standing hip ext, SLS, standing hip flexor stretch, standing march, standing abd, mini squats- all demo'd correctly.      Gait: Practiced repeated step-through with min UE support- improved HE in stance. Walked 2x~70' with straight cane- practiced incr HE in stance- steady. May use straight cane outside of home on level surfaces up to ~60\".    Manual: IV- hip flex, IR. IV-- HE  // PROM hip flex: ~105. HE: ~10 deg- decr stiffness.    Assessment: Incr ROM with rx. Gait improving.     Plan: Incr gait training, LE stabilization exer.  "

## 2024-11-27 ENCOUNTER — APPOINTMENT (OUTPATIENT)
Dept: PHYSICAL THERAPY | Facility: CLINIC | Age: 64
End: 2024-11-27
Payer: COMMERCIAL

## 2024-12-03 ENCOUNTER — OFFICE VISIT (OUTPATIENT)
Dept: ORTHOPEDIC SURGERY | Facility: HOSPITAL | Age: 64
End: 2024-12-03
Payer: COMMERCIAL

## 2024-12-03 ENCOUNTER — LAB (OUTPATIENT)
Dept: LAB | Facility: LAB | Age: 64
End: 2024-12-03
Payer: COMMERCIAL

## 2024-12-03 ENCOUNTER — HOSPITAL ENCOUNTER (OUTPATIENT)
Dept: RADIOLOGY | Facility: HOSPITAL | Age: 64
Discharge: HOME | End: 2024-12-03
Payer: COMMERCIAL

## 2024-12-03 DIAGNOSIS — Z96.642 STATUS POST LEFT HIP REPLACEMENT: Primary | ICD-10-CM

## 2024-12-03 DIAGNOSIS — N18.31 CHRONIC KIDNEY DISEASE, STAGE 3A (MULTI): ICD-10-CM

## 2024-12-03 DIAGNOSIS — I10 ESSENTIAL (PRIMARY) HYPERTENSION: Primary | ICD-10-CM

## 2024-12-03 DIAGNOSIS — I10 ESSENTIAL (PRIMARY) HYPERTENSION: ICD-10-CM

## 2024-12-03 DIAGNOSIS — Z96.642 STATUS POST LEFT HIP REPLACEMENT: ICD-10-CM

## 2024-12-03 LAB
25(OH)D3 SERPL-MCNC: 72 NG/ML (ref 30–100)
ALBUMIN SERPL BCP-MCNC: 4.4 G/DL (ref 3.4–5)
ALP SERPL-CCNC: 72 U/L (ref 33–136)
ALT SERPL W P-5'-P-CCNC: 18 U/L (ref 10–52)
ANION GAP SERPL CALC-SCNC: 11 MMOL/L (ref 10–20)
AST SERPL W P-5'-P-CCNC: 20 U/L (ref 9–39)
BASOPHILS # BLD AUTO: 0.09 X10*3/UL (ref 0–0.1)
BASOPHILS NFR BLD AUTO: 1.2 %
BILIRUB SERPL-MCNC: 0.4 MG/DL (ref 0–1.2)
BUN SERPL-MCNC: 32 MG/DL (ref 6–23)
CALCIUM SERPL-MCNC: 9.6 MG/DL (ref 8.6–10.6)
CHLORIDE SERPL-SCNC: 103 MMOL/L (ref 98–107)
CO2 SERPL-SCNC: 26 MMOL/L (ref 21–32)
CREAT SERPL-MCNC: 1.28 MG/DL (ref 0.5–1.3)
EGFRCR SERPLBLD CKD-EPI 2021: 62 ML/MIN/1.73M*2
EOSINOPHIL # BLD AUTO: 1.26 X10*3/UL (ref 0–0.7)
EOSINOPHIL NFR BLD AUTO: 17.5 %
ERYTHROCYTE [DISTWIDTH] IN BLOOD BY AUTOMATED COUNT: 14.7 % (ref 11.5–14.5)
EST. AVERAGE GLUCOSE BLD GHB EST-MCNC: 111 MG/DL
GLUCOSE SERPL-MCNC: 102 MG/DL (ref 74–99)
HBA1C MFR BLD: 5.5 %
HCT VFR BLD AUTO: 34.2 % (ref 41–52)
HGB BLD-MCNC: 10.9 G/DL (ref 13.5–17.5)
IMM GRANULOCYTES # BLD AUTO: 0.03 X10*3/UL (ref 0–0.7)
IMM GRANULOCYTES NFR BLD AUTO: 0.4 % (ref 0–0.9)
LYMPHOCYTES # BLD AUTO: 1.01 X10*3/UL (ref 1.2–4.8)
LYMPHOCYTES NFR BLD AUTO: 14 %
MAGNESIUM SERPL-MCNC: 2.07 MG/DL (ref 1.6–2.4)
MCH RBC QN AUTO: 27.5 PG (ref 26–34)
MCHC RBC AUTO-ENTMCNC: 31.9 G/DL (ref 32–36)
MCV RBC AUTO: 86 FL (ref 80–100)
MONOCYTES # BLD AUTO: 0.47 X10*3/UL (ref 0.1–1)
MONOCYTES NFR BLD AUTO: 6.5 %
NEUTROPHILS # BLD AUTO: 4.35 X10*3/UL (ref 1.2–7.7)
NEUTROPHILS NFR BLD AUTO: 60.4 %
NRBC BLD-RTO: 0 /100 WBCS (ref 0–0)
PHOSPHATE SERPL-MCNC: 3.7 MG/DL (ref 2.5–4.9)
PLATELET # BLD AUTO: 374 X10*3/UL (ref 150–450)
POTASSIUM SERPL-SCNC: 4.3 MMOL/L (ref 3.5–5.3)
PROT SERPL-MCNC: 7.3 G/DL (ref 6.4–8.2)
RBC # BLD AUTO: 3.96 X10*6/UL (ref 4.5–5.9)
SODIUM SERPL-SCNC: 136 MMOL/L (ref 136–145)
T4 FREE SERPL-MCNC: 1.25 NG/DL (ref 0.78–1.48)
TSH SERPL-ACNC: 1.87 MIU/L (ref 0.44–3.98)
WBC # BLD AUTO: 7.2 X10*3/UL (ref 4.4–11.3)

## 2024-12-03 PROCEDURE — 84100 ASSAY OF PHOSPHORUS: CPT

## 2024-12-03 PROCEDURE — 84439 ASSAY OF FREE THYROXINE: CPT

## 2024-12-03 PROCEDURE — 84443 ASSAY THYROID STIM HORMONE: CPT

## 2024-12-03 PROCEDURE — 82306 VITAMIN D 25 HYDROXY: CPT

## 2024-12-03 PROCEDURE — 73502 X-RAY EXAM HIP UNI 2-3 VIEWS: CPT | Mod: LEFT SIDE | Performed by: RADIOLOGY

## 2024-12-03 PROCEDURE — 80053 COMPREHEN METABOLIC PANEL: CPT

## 2024-12-03 PROCEDURE — 36415 COLL VENOUS BLD VENIPUNCTURE: CPT

## 2024-12-03 PROCEDURE — 83735 ASSAY OF MAGNESIUM: CPT

## 2024-12-03 PROCEDURE — 73502 X-RAY EXAM HIP UNI 2-3 VIEWS: CPT | Mod: LT

## 2024-12-03 PROCEDURE — 85025 COMPLETE CBC W/AUTO DIFF WBC: CPT

## 2024-12-03 PROCEDURE — 99211 OFF/OP EST MAY X REQ PHY/QHP: CPT | Performed by: STUDENT IN AN ORGANIZED HEALTH CARE EDUCATION/TRAINING PROGRAM

## 2024-12-03 PROCEDURE — 83036 HEMOGLOBIN GLYCOSYLATED A1C: CPT

## 2024-12-03 RX ORDER — FLUTICASONE PROPIONATE AND SALMETEROL 250; 50 UG/1; UG/1
POWDER RESPIRATORY (INHALATION)
COMMUNITY
Start: 2024-12-03

## 2024-12-03 ASSESSMENT — PAIN - FUNCTIONAL ASSESSMENT: PAIN_FUNCTIONAL_ASSESSMENT: NO/DENIES PAIN

## 2024-12-03 NOTE — PROGRESS NOTES
Kiara Menchaca MD   Adult Reconstruction and Joint Replacement Surgery  Phone: [phone]     Fax: [phone]       Name: Otto Yen  Age: 64 y.o.   : 1960   Date of Visit: 12/3/2024    HIP REPLACEMENT POST-OP VISIT     Procedure: Left direct anterior total hip replacement  Date: 10/31/2024    Chief Complaint: Left Hip Replacement Surgery Follow Up    History of Present Illness:    The patient is now 4 weeks 5 days status post left direct anterior total hip replacement surgery.    The patient has no pain.  He does have occasional soreness over the incision site on the left side.  He is completely asymptomatic on the right side.    He is very happy with his surgical outcome and is nearly back to all preferred activities.    Currently taking nothing for pain.     The patient has low stiffness.     The patient has slight limp.    Patient is walking with nothing or cane for assistive device.    The patient goes up and down stairs using a rail .    Patient can walk 2-3 blocks.    The patient puts shoes and socks on with ease.     The patient is doing outpatient physical therapy.  He is doing home exercises religiously.    The patient does not have trochanteric pain.    No fevers or drainage from the incision.    There are no concerns.    Physical Exam:    The patient is well appearing, alert and oriented to person, place and time.    The incision is well healed.  There is no sign of wound complication.    There is no tenderness over the greater trochanter.    Range of motion is: full extension to 100 degrees of flexion.    The hip internally rotates to 15 degrees and externally rotates to 45 degrees.    Abduction is 45 degrees and adduction is 20 degrees.    There is no instability of the joint.    Homans sign is negative.    Neurologic, and vascular examinations are normal.    PROMs   Koos Jr-Knee Injury And Osteoarthritis Outcome Score For Joint Replacement    2024  8:59 PM EDT - Filed by  Patient   Instructions    The following question concerns the amount of joint stiffness you have experienced during the last week in your knee. Stiffness is a sensation of restriction or slowness in the ease with which you move your knee joint.   How severe is your knee stiffness after first wakening in the morning? Mild   What amount of knee pain have you experienced the last week during the following activities?   Twisting/pivoting on your knee Mild   Straightening knee fully Mild   Going up or down stairs Mild   Standing upright Mild   The following questions concern your physical function. By this we mean your ability to move around and to look after yourself. For each of the following activities please indicate the degree of difficulty you have experienced in the last week due to your knee.   Rising from sitting Moderate   Bending to floor/ an object Moderate   Koos Jr Scoring (range: 0 - 100) 63.78     Hoos Jr-Hip Disability And Osteoarthritis Outcome Score For Joint Replacement    8/19/2024  9:00 PM EDT - Filed by Patient   Instructions    What amount of hip pain have you experienced the last week during the following activities?   Going up or down stairs Moderate   Walking on an uneven surface Mild   The following questions concern your physical function. By this we mean your ability to move around and to look after yourself. For each of the following activities please indicate the degree of difficulty you have experienced in the last week due to your hip.   Rising from sitting Moderate   Bending to floor/ an object Moderate   Lying in bed (turning over, maintaining hip position) None   Sitting Mild   Hoos Jr Scoring (range: 0 - 100) 64.66     Ort [facility] 12 Item Health Survey (Vr-12)    7/25/2024 10:09 AM EDT - Filed by Jitendra Talavera RN   In general, would you say your health is: Good   The following questions are about activities you might do during a typical day. Does your health now  limit you in these activities?  If so, how much?   Moderate activities, such as moving a table, pushing a vacuum , bowling, or playing golf? Yes, Limited A Little   Climbing several flights of stairs? Yes, Limited A Lot   During the past 4 weeks, have you had any of the following problems with your work or other regular daily activities as a result of your physical health?   Accomplished less than you would like. Yes, A Little Of The Time   Were limited in the kind of work or other activities. Yes, A Little Of The Time   During the past 4 weeks, have you had any of the following problems with your work or other regular daily activities as a result of any emotional problems (such as feeling depressed or anxious)?   Accomplished less than you would like Yes, Some Of The Time   Didn't do work or other activities as carefully as usual Yes, Some Of The Time   During the past 4 weeks, how much did pain interfere with your normal work (including both work outside the home and house work)? Moderately   How much of the time during the past 4 weeks:   Have you felt calm and peaceful? A Good Bit Of The Time   Did you have a lot of energy? A Little Of The Time   Have you felt downhearted and blue? Some Of The Time   During the past 4 weeks, how much of the time has your physical health or emotional problems interfered with your social activities (like visiting with friends, relatives, etc.)? Some Of The Time   Compared to one year ago, how would you rate your physical health in general now? Slightly Worse   Compared to one year ago, how would you rate your emotional problems (such as feeling anxious, depressed or irritable) now? Slightly Worse   Ort Vr-12 Question Pcs (range: 0 - 100) 37.22   Ort Vr-12 Question McS (range: 0 - 100) 38.6           Imaging:    X-rays were personally reviewed today and show implants in good position with no evidence of complication.    Impression and Plan:  This patient is 4 weeks 5 days  from Left direct anterior total hip replacement.    The patient is doing well following total hip replacement surgery.  Antibiotic prophylaxis prior to dental procedures were reviewed.  Hip precautions were reviewed.  Long-term failure mechanisms were reviewed.  The patient was asked to contact the office sooner if there are any concerns. New physical therapy prescription was given and exercises reviewed with the patient in the office. Their questions were answered.     RTC: 6 months from R BILLIE date    X-rays at next visit: Postop BILLIE series - BILATERAL hips    _____________________  Kiara Menchaca MD   Attending Orthopaedic Surgeon  [facility]    [facility]    This office note was transcribed with dictation software.  Please excuse any typographical errors, program misunderstandings leading to inadvertent insertions or deletions of inappropriate wording, pronoun errors and other unintentional transcription errors not noticed on proof-reading.

## 2024-12-04 ENCOUNTER — TREATMENT (OUTPATIENT)
Dept: PHYSICAL THERAPY | Facility: CLINIC | Age: 64
End: 2024-12-04
Payer: COMMERCIAL

## 2024-12-04 ENCOUNTER — APPOINTMENT (OUTPATIENT)
Dept: INTEGRATIVE MEDICINE | Facility: CLINIC | Age: 64
End: 2024-12-04
Payer: COMMERCIAL

## 2024-12-04 DIAGNOSIS — M16.11 UNILATERAL PRIMARY OSTEOARTHRITIS, RIGHT HIP: ICD-10-CM

## 2024-12-04 PROCEDURE — 97140 MANUAL THERAPY 1/> REGIONS: CPT | Mod: GP | Performed by: PHYSICAL THERAPIST

## 2024-12-04 PROCEDURE — 97110 THERAPEUTIC EXERCISES: CPT | Mod: GP | Performed by: PHYSICAL THERAPIST

## 2024-12-05 ENCOUNTER — APPOINTMENT (OUTPATIENT)
Dept: INTEGRATIVE MEDICINE | Facility: CLINIC | Age: 64
End: 2024-12-05
Payer: COMMERCIAL

## 2024-12-06 ENCOUNTER — APPOINTMENT (OUTPATIENT)
Dept: PHYSICAL THERAPY | Facility: CLINIC | Age: 64
End: 2024-12-06
Payer: COMMERCIAL

## 2024-12-09 ENCOUNTER — TREATMENT (OUTPATIENT)
Dept: PHYSICAL THERAPY | Facility: CLINIC | Age: 64
End: 2024-12-09
Payer: COMMERCIAL

## 2024-12-09 ENCOUNTER — APPOINTMENT (OUTPATIENT)
Dept: ORTHOPEDIC SURGERY | Facility: HOSPITAL | Age: 64
End: 2024-12-09
Payer: COMMERCIAL

## 2024-12-09 DIAGNOSIS — M16.11 UNILATERAL PRIMARY OSTEOARTHRITIS, RIGHT HIP: ICD-10-CM

## 2024-12-09 PROCEDURE — 97110 THERAPEUTIC EXERCISES: CPT | Mod: GP | Performed by: PHYSICAL THERAPIST

## 2024-12-09 PROCEDURE — 97140 MANUAL THERAPY 1/> REGIONS: CPT | Mod: GP | Performed by: PHYSICAL THERAPIST

## 2024-12-09 NOTE — PROGRESS NOTES
"Physical Therapy Follow-up    Patient Name: Otto Yen  MRN: [id number]  Today's Date: 12/9/2024  Time Calculation  Start Time: 0432  Stop Time: 0515  Time Calculation (min): 43 min      Visit#: 5. 25 left in 2024.  Cert dates: NA    Precautions: Hydrocephalus. Low fall risk.    Subjective:  Decr left hip pain, no recent right hip pain. Walking better- using cane more. HEP going well. Has been using cane on left or right side- feels comfortable regardless of side. Had right knee tightness yesterday for no apparent reason.      Objective:   PROM left HF: ~100- stiff. HE: 10- stiff.   AROM right KF and KE wnl.   Gait: using straight cane correctly. Mild decr left hip ext in stance. Good stability and gait pattern with cane on right or left.     Treatment:   Therex: Modified Otto stretch with foot on bed, 2-leg bridge, standing hip ext, SLS, standing hip flexor stretch, standing march, standing abd, mini squats, line walking, step-ups to 6\" step- all demo'd correctly.     Told he can use cane on either side.      Manual: IV- hip flex, IR. IV-- HE  // PROM hip flex: ~105. HE: ~10 deg- decr stiffness.    Assessment: Incr ROM with rx. Gait improving.     Plan: Incr gait training, LE stabilization exer.  "

## 2024-12-11 ENCOUNTER — APPOINTMENT (OUTPATIENT)
Dept: PHYSICAL THERAPY | Facility: CLINIC | Age: 64
End: 2024-12-11
Payer: COMMERCIAL

## 2024-12-13 ENCOUNTER — APPOINTMENT (OUTPATIENT)
Dept: PHYSICAL THERAPY | Facility: CLINIC | Age: 64
End: 2024-12-13
Payer: COMMERCIAL

## 2024-12-16 ENCOUNTER — APPOINTMENT (OUTPATIENT)
Dept: ORTHOPEDIC SURGERY | Facility: HOSPITAL | Age: 64
End: 2024-12-16
Payer: COMMERCIAL

## 2024-12-18 ENCOUNTER — APPOINTMENT (OUTPATIENT)
Dept: PHYSICAL THERAPY | Facility: CLINIC | Age: 64
End: 2024-12-18
Payer: COMMERCIAL

## 2024-12-20 ENCOUNTER — APPOINTMENT (OUTPATIENT)
Dept: PHYSICAL THERAPY | Facility: CLINIC | Age: 64
End: 2024-12-20
Payer: COMMERCIAL

## 2024-12-23 ENCOUNTER — APPOINTMENT (OUTPATIENT)
Dept: UROLOGY | Facility: CLINIC | Age: 64
End: 2024-12-23
Payer: COMMERCIAL

## 2024-12-23 ENCOUNTER — APPOINTMENT (OUTPATIENT)
Dept: PHYSICAL THERAPY | Facility: CLINIC | Age: 64
End: 2024-12-23
Payer: COMMERCIAL

## 2024-12-27 ENCOUNTER — APPOINTMENT (OUTPATIENT)
Dept: PHYSICAL THERAPY | Facility: CLINIC | Age: 64
End: 2024-12-27
Payer: COMMERCIAL

## 2025-01-15 ENCOUNTER — OFFICE VISIT (OUTPATIENT)
Dept: NEUROSURGERY | Facility: HOSPITAL | Age: 65
End: 2025-01-15
Payer: COMMERCIAL

## 2025-01-15 VITALS
BODY MASS INDEX: 31.83 KG/M2 | SYSTOLIC BLOOD PRESSURE: 166 MMHG | HEIGHT: 72 IN | RESPIRATION RATE: 17 BRPM | DIASTOLIC BLOOD PRESSURE: 80 MMHG | HEART RATE: 60 BPM | WEIGHT: 235 LBS

## 2025-01-15 DIAGNOSIS — Q03.9 HYDROCEPHALUS, CONGENITAL (MULTI): Primary | ICD-10-CM

## 2025-01-15 PROCEDURE — 3079F DIAST BP 80-89 MM HG: CPT | Performed by: NEUROLOGICAL SURGERY

## 2025-01-15 PROCEDURE — 3008F BODY MASS INDEX DOCD: CPT | Performed by: NEUROLOGICAL SURGERY

## 2025-01-15 PROCEDURE — 3077F SYST BP >= 140 MM HG: CPT | Performed by: NEUROLOGICAL SURGERY

## 2025-01-15 PROCEDURE — 99212 OFFICE O/P EST SF 10 MIN: CPT | Performed by: NEUROLOGICAL SURGERY

## 2025-01-15 PROCEDURE — 1036F TOBACCO NON-USER: CPT | Performed by: NEUROLOGICAL SURGERY

## 2025-01-15 ASSESSMENT — ENCOUNTER SYMPTOMS
GASTROINTESTINAL NEGATIVE: 1
EYES NEGATIVE: 1
ENDOCRINE NEGATIVE: 1
FREQUENCY: 1
BACK PAIN: 1
DIFFICULTY URINATING: 1
WHEEZING: 1
APNEA: 1
HEMATOLOGIC/LYMPHATIC NEGATIVE: 1
CONFUSION: 1
CONSTITUTIONAL NEGATIVE: 1
CARDIOVASCULAR NEGATIVE: 1

## 2025-01-15 NOTE — PROGRESS NOTES
Shunted congenital hydrocephalus. 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 Has had hip surgery and here to discuss shunt work up.      64-year-old man with shunted congenital hydrocephalus returns for follow-up.  Since his last visit he has undergone hip surgery.  He feels that his walking is better.  His caregiver however feels that he continues to have problems with memory and cognition.  The patient denies these.    Review of Systems   Constitutional: Negative.    HENT: Negative.     Eyes: Negative.    Respiratory:  Positive for apnea and wheezing.    Cardiovascular: Negative.    Gastrointestinal: Negative.    Endocrine: Negative.    Genitourinary:  Positive for difficulty urinating and frequency.   Musculoskeletal:  Positive for back pain and gait problem.   Skin: Negative.    Allergic/Immunologic: Positive for environmental allergies and food allergies.   Hematological: Negative.    Psychiatric/Behavioral:  Positive for confusion.        Visit Vitals  /80   Pulse 60   Resp 17   Ht 1.829 m (6')   Wt 107 kg (235 lb)   BMI 31.87 kg/m²   Smoking Status Never   BSA 2.33 m²           Current Outpatient Medications:     atorvastatin (Lipitor) 40 mg tablet, Take 1 tablet (40 mg) by mouth once daily., Disp: , Rfl:     cyanocobalamin (Vitamin B-12) 1,000 mcg/mL injection, Inject 1 mL (1,000 mcg) into the muscle every 30 (thirty) days. Given 10/9/24, Disp: , Rfl:     gabapentin (Neurontin) 300 mg capsule, TAKE 1 CAPSULE BY MOUTH WITH GABAPENTIN 600MG AT BEDTIME., Disp: , Rfl:     glucosamine-chondroitin 500-400 mg tablet, Take 1 tablet by mouth once daily., Disp: , Rfl:     ketoconazole (NIZOral) 2 % shampoo, 1 application daily when washing hair, Disp: , Rfl:     metFORMIN  mg 24 hr tablet, Take 1 tablet (500 mg) by mouth once daily. Do not crush, chew, or split., Disp: , Rfl:     multivitamin tablet, Take 1 tablet by mouth once daily., Disp: , Rfl:     omega 3-dha-epa-fish oil (Fish OiL) 1,200 (144-216) mg capsule, Take 1  capsule (1,200 mg) by mouth once daily., Disp: , Rfl:     pyridoxine (Vitamin B-6) 50 mg tablet, Take 1 tablet (50 mg) by mouth once daily., Disp: , Rfl:     ramipril (Altace) 10 mg capsule, Take 1 capsule (10 mg) by mouth once daily., Disp: , Rfl:     tacrolimus (Protopic) 0.1 % ointment, Apply 1 Application topically if needed., Disp: , Rfl:     thiamine (Vitamin B-1) 500 mg tablet, Take 1 tablet (500 mg) by mouth once daily., Disp: , Rfl:     verapamil SR (Calan-SR) 240 mg ER tablet, Take 1 tablet (240 mg) by mouth once daily at bedtime. Do not crush or chew., Disp: , Rfl:     chlorthalidone (Hygroton) 25 mg tablet, Take 1 tablet (25 mg) by mouth once daily. (Patient not taking: Reported on 1/15/2025), Disp: , Rfl:     cholecalciferol, vitD3,/vit K2 (VITAMIN D3-VITAMIN K2 ORAL), Take 1 tablet by mouth once daily., Disp: , Rfl:     DIETARY SUPPLEMENT ORAL, Take 1 capsule by mouth once daily. probiotic, Disp: , Rfl:     fluticasone (Flonase) 50 mcg/actuation nasal spray, Administer 1 spray into each nostril once daily. (Patient not taking: Reported on 1/15/2025), Disp: , Rfl:     fluticasone propion-salmeteroL (Advair Diskus) 250-50 mcg/dose diskus inhaler, Inhale 1 puff twice a day by inhalation route as directed for 30 days. (Patient not taking: Reported on 1/15/2025), Disp: , Rfl:     gabapentin (Neurontin) 600 mg tablet, Take by mouth. TAKE 1 TABLET BY MOUTH WITH GABAPENTIN 300MG AT BEDTIME (Patient not taking: Reported on 1/15/2025), Disp: , Rfl:     ketoconazole (NIZOral) 2 % cream, , Disp: , Rfl:     lidocaine (Lidoderm) 5 % patch, , Disp: , Rfl:     ondansetron (Zofran) 4 mg tablet, Take 1 tablet (4 mg) by mouth every 8 hours if needed for nausea or vomiting., Disp: 20 tablet, Rfl: 0    tamsulosin (Flomax) 0.4 mg 24 hr capsule, Take 1 capsule (0.4 mg) by mouth once daily. (Patient not taking: Reported on 1/15/2025), Disp: 90 capsule, Rfl: 3    triamcinolone (Kenalog) 0.1 % ointment, Apply 1 Application  topically once daily. (Patient not taking: Reported on 1/15/2025), Disp: , Rfl:       Objective   Neurological Exam    On examination, the patient is alert and interactive.  Extraocular movements are intact.  Face moves symmetrically.  He moves all extremities symmetrically.    We have previously discussed concern based on the patient's chronic ventriculomegaly that he has some symptoms that may be consistent with normal pressure hydrocephalus.  We discussed a radionucleotide tracer study as a possibility to see if his shunt was still functional.  Today, we reviewed this again.  Specifically, we discussed that the reason to do the tracer study was because the patient was having significant symptoms.  The patient himself does not believe that he does.  He does not want to proceed with the tracer study.  If the patient has worsening function, this may be reconsidered.

## 2025-02-10 NOTE — PROGRESS NOTES
Kiara Menchaca MD   Adult Reconstruction and Joint Replacement Surgery  Phone: [phone]     Fax: [phone]       Name: Otto Yen  Age: 64 y.o.   : 1960   Date of Visit: 2025    HIP REPLACEMENT POSTOPERATIVE VISIT    Procedure: right direct anterior total hip replacement  Date of Surgery: 2024  Diagnosis: Right hip arthritis, severe femoral acetabular impingement    Chief Complaint: Right hip replacement surgery follow-up    History of Present Illness:    The patient is now 5 months 23 days status post right direct anterior total hip replacement surgery.    The patient has no pain.    Currently taking nothing for pain.     The patient has low stiffness.     The patient has no limp.    Patient is walking with nothing for assistive device.    The patient goes up and down stairs with a rail.    Patient can walk 6 blocks.    The patient puts shoes and socks on with ease.     The patient is doing outpatient physical therapy.    The patient does not have trochanteric pain.    No fevers or drainage from the incision.    There are no concerns.    Physical Exam:    The patient is well appearing, alert and oriented to person, place and time.    The incision is well healed.  There is no sign of wound complication.    There is no tenderness over the greater trochanter.    Range of motion is: full extension to 105 degrees of flexion.    The hip internally rotates to 20 degrees and externally rotates to 50 degrees.    Abduction is 45 degrees and adduction is 25 degrees.    There is no instability of the joint.    Homans sign is negative.    Neurologic, and vascular examinations are normal.    PROMs  Koos Jr-Knee Injury And Osteoarthritis Outcome Score For Joint Replacement    2024  8:59 PM EDT - Filed by Patient   Instructions    The following question concerns the amount of joint stiffness you have experienced during the last week in your knee. Stiffness is a sensation of restriction or  slowness in the ease with which you move your knee joint.   How severe is your knee stiffness after first wakening in the morning? Mild   What amount of knee pain have you experienced the last week during the following activities?   Twisting/pivoting on your knee Mild   Straightening knee fully Mild   Going up or down stairs Mild   Standing upright Mild   The following questions concern your physical function. By this we mean your ability to move around and to look after yourself. For each of the following activities please indicate the degree of difficulty you have experienced in the last week due to your knee.   Rising from sitting Moderate   Bending to floor/ an object Moderate   Koos Jr Scoring (range: 0 - 100) 63.78     Hoos Jr-Hip Disability And Osteoarthritis Outcome Score For Joint Replacement    8/19/2024  9:00 PM EDT - Filed by Patient   Instructions    What amount of hip pain have you experienced the last week during the following activities?   Going up or down stairs Moderate   Walking on an uneven surface Mild   The following questions concern your physical function. By this we mean your ability to move around and to look after yourself. For each of the following activities please indicate the degree of difficulty you have experienced in the last week due to your hip.   Rising from sitting Moderate   Bending to floor/ an object Moderate   Lying in bed (turning over, maintaining hip position) None   Sitting Mild   Hoos Jr Scoring (range: 0 - 100) 64.66     [facility] Omega 12 Item Health Survey (Vr-12)    7/25/2024 10:09 AM EDT - Filed by Jitendra Talavera RN   In general, would you say your health is: Good   The following questions are about activities you might do during a typical day. Does your health now limit you in these activities?  If so, how much?   Moderate activities, such as moving a table, pushing a vacuum , bowling, or playing golf? Yes, Limited A Little   Climbing  several flights of stairs? Yes, Limited A Lot   During the past 4 weeks, have you had any of the following problems with your work or other regular daily activities as a result of your physical health?   Accomplished less than you would like. Yes, A Little Of The Time   Were limited in the kind of work or other activities. Yes, A Little Of The Time   During the past 4 weeks, have you had any of the following problems with your work or other regular daily activities as a result of any emotional problems (such as feeling depressed or anxious)?   Accomplished less than you would like Yes, Some Of The Time   Didn't do work or other activities as carefully as usual Yes, Some Of The Time   During the past 4 weeks, how much did pain interfere with your normal work (including both work outside the home and house work)? Moderately   How much of the time during the past 4 weeks:   Have you felt calm and peaceful? A Good Bit Of The Time   Did you have a lot of energy? A Little Of The Time   Have you felt downhearted and blue? Some Of The Time   During the past 4 weeks, how much of the time has your physical health or emotional problems interfered with your social activities (like visiting with friends, relatives, etc.)? Some Of The Time   Compared to one year ago, how would you rate your physical health in general now? Slightly Worse   Compared to one year ago, how would you rate your emotional problems (such as feeling anxious, depressed or irritable) now? Slightly Worse   Ort Vr-12 Question Pcs (range: 0 - 100) 37.22   Ort Vr-12 Question McS (range: 0 - 100) 38.6           Imaging:    X-rays were personally reviewed today and show implants in good position with no evidence of complication.    Impression and Plan:    This patient is 5 months 23 days from right direct anterior total hip replacement.    The patient is doing well following total hip replacement surgery.  Antibiotic prophylaxis prior to dental procedures is  recommended. Pain and symptom monitoring was reviewed.  Hip precautions were reviewed.  Long-term failure mechanisms were reviewed.  The patient was asked to contact the office sooner if there are any concerns. Continued physical therapy is encouraged. Their questions were answered.     RTC: 6 months from left total hip arthroplasty    X-rays at next visit: Postop BILLIE series     _____________________  Kiara Menchaca MD   Attending Orthopaedic Surgeon  [facility]    [facility]    This office note was transcribed with dictation software.  Please excuse any typographical errors, program misunderstandings leading to inadvertent insertions or deletions of inappropriate wording, pronoun errors and other unintentional transcription errors not noticed on proof-reading.

## 2025-02-11 ENCOUNTER — APPOINTMENT (OUTPATIENT)
Dept: RADIOLOGY | Facility: CLINIC | Age: 65
End: 2025-02-11
Payer: COMMERCIAL

## 2025-02-12 ENCOUNTER — HOSPITAL ENCOUNTER (OUTPATIENT)
Dept: RADIOLOGY | Facility: HOSPITAL | Age: 65
Discharge: HOME | End: 2025-02-12
Payer: COMMERCIAL

## 2025-02-12 ENCOUNTER — APPOINTMENT (OUTPATIENT)
Dept: ORTHOPEDIC SURGERY | Facility: HOSPITAL | Age: 65
End: 2025-02-12
Payer: COMMERCIAL

## 2025-02-12 ENCOUNTER — APPOINTMENT (OUTPATIENT)
Dept: RADIOLOGY | Facility: HOSPITAL | Age: 65
End: 2025-02-12
Payer: COMMERCIAL

## 2025-02-12 ENCOUNTER — OFFICE VISIT (OUTPATIENT)
Dept: ORTHOPEDIC SURGERY | Facility: HOSPITAL | Age: 65
End: 2025-02-12
Payer: COMMERCIAL

## 2025-02-12 VITALS — WEIGHT: 235 LBS | BODY MASS INDEX: 31.83 KG/M2 | HEIGHT: 72 IN

## 2025-02-12 DIAGNOSIS — Z96.641 STATUS POST RIGHT HIP REPLACEMENT: Primary | ICD-10-CM

## 2025-02-12 DIAGNOSIS — Z96.642 STATUS POST LEFT HIP REPLACEMENT: ICD-10-CM

## 2025-02-12 DIAGNOSIS — Z96.641 STATUS POST RIGHT HIP REPLACEMENT: ICD-10-CM

## 2025-02-12 PROCEDURE — G2211 COMPLEX E/M VISIT ADD ON: HCPCS | Performed by: STUDENT IN AN ORGANIZED HEALTH CARE EDUCATION/TRAINING PROGRAM

## 2025-02-12 PROCEDURE — 73502 X-RAY EXAM HIP UNI 2-3 VIEWS: CPT | Mod: RT

## 2025-02-12 PROCEDURE — 99213 OFFICE O/P EST LOW 20 MIN: CPT | Performed by: STUDENT IN AN ORGANIZED HEALTH CARE EDUCATION/TRAINING PROGRAM

## 2025-02-12 PROCEDURE — 1036F TOBACCO NON-USER: CPT | Performed by: STUDENT IN AN ORGANIZED HEALTH CARE EDUCATION/TRAINING PROGRAM

## 2025-02-12 PROCEDURE — 3008F BODY MASS INDEX DOCD: CPT | Performed by: STUDENT IN AN ORGANIZED HEALTH CARE EDUCATION/TRAINING PROGRAM

## 2025-02-19 ENCOUNTER — APPOINTMENT (OUTPATIENT)
Dept: ORTHOPEDIC SURGERY | Facility: HOSPITAL | Age: 65
End: 2025-02-19
Payer: COMMERCIAL

## 2025-02-24 ENCOUNTER — APPOINTMENT (OUTPATIENT)
Dept: ORTHOPEDIC SURGERY | Facility: HOSPITAL | Age: 65
End: 2025-02-24
Payer: COMMERCIAL

## 2025-07-01 NOTE — PROGRESS NOTES
" Kiara Menchaca MD   Adult Reconstruction and Joint Replacement Surgery  Phone: [phone]     Fax: [phone]       Name: Otto Yen  Age: 65 y.o.   : 1960   Date of Visit: 2025    HIP REPLACEMENT POSTOPERATIVE VISIT    Procedure: right direct anterior total hip replacement  Date of Surgery: 2024  Diagnosis: Right hip arthritis, severe femoral acetabular impingement    Chief Complaint: Right hip replacement surgery follow-up    History of Present Illness:    The patient is now 10 months 12 days status post right direct anterior total hip replacement surgery.    The patient has no pain.    Currently taking nothing for pain.     The patient has low stiffness.     The patient has no limp.    Patient is walking with nothing for assistive device.    The patient goes up and down stairs normally.    Patient can walk 6 blocks.    The patient puts shoes and socks on with ease.     The patient is doing home physical therapy.    The patient does not have trochanteric pain.    No fevers or drainage from the incision.    There are no concerns.    Physical Exam:    The patient is well appearing, alert and oriented to person, place and time.    The incision is well healed.  There is no sign of wound complication.    There is no tenderness over the greater trochanter.    Range of motion is: full extension to 105 degrees of flexion.    The hip internally rotates to 15 degrees and externally rotates to 45 degrees.    Abduction is 45 degrees and adduction is 20 degrees.    There is no instability of the joint.    Homans sign is negative.    Neurologic, and vascular examinations are normal.    PROMs   24   \"JR. CONNIE Score\" 52.97 64.66 76.78       Imaging:    X-rays were personally reviewed today and show implants in good position with no evidence of complication.    Impression and Plan:    This patient is 10 months 12 days from right direct anterior total hip replacement.    The " patient is doing well following total hip replacement surgery.  Antibiotic prophylaxis prior to dental procedures is recommended. Pain and symptom monitoring was reviewed.  Hip precautions were reviewed.  Long-term failure mechanisms were reviewed.  The patient was asked to contact the office sooner if there are any concerns. Their questions were answered.     RTC: Annually for BILLIE surveillance    X-rays at next visit: Postop BILLIE series    _____________________  Kiara Menchaca MD   Attending Orthopaedic Surgeon  [facility]    [facility]    This office note was transcribed with dictation software.  Please excuse any typographical errors, program misunderstandings leading to inadvertent insertions or deletions of inappropriate wording, pronoun errors and other unintentional transcription errors not noticed on proof-reading.

## 2025-07-02 ENCOUNTER — OFFICE VISIT (OUTPATIENT)
Dept: ORTHOPEDIC SURGERY | Facility: HOSPITAL | Age: 65
End: 2025-07-02
Payer: COMMERCIAL

## 2025-07-02 ENCOUNTER — HOSPITAL ENCOUNTER (OUTPATIENT)
Dept: RADIOLOGY | Facility: HOSPITAL | Age: 65
Discharge: HOME | End: 2025-07-02
Payer: COMMERCIAL

## 2025-07-02 DIAGNOSIS — Z96.642 STATUS POST LEFT HIP REPLACEMENT: ICD-10-CM

## 2025-07-02 PROCEDURE — 99214 OFFICE O/P EST MOD 30 MIN: CPT | Performed by: STUDENT IN AN ORGANIZED HEALTH CARE EDUCATION/TRAINING PROGRAM

## 2025-07-02 PROCEDURE — 73502 X-RAY EXAM HIP UNI 2-3 VIEWS: CPT | Mod: LT

## 2025-07-02 PROCEDURE — 1036F TOBACCO NON-USER: CPT | Performed by: STUDENT IN AN ORGANIZED HEALTH CARE EDUCATION/TRAINING PROGRAM

## 2025-07-02 PROCEDURE — 99212 OFFICE O/P EST SF 10 MIN: CPT | Performed by: STUDENT IN AN ORGANIZED HEALTH CARE EDUCATION/TRAINING PROGRAM

## 2025-07-02 PROCEDURE — 73502 X-RAY EXAM HIP UNI 2-3 VIEWS: CPT | Mod: LEFT SIDE | Performed by: RADIOLOGY

## 2025-07-02 ASSESSMENT — PAIN - FUNCTIONAL ASSESSMENT: PAIN_FUNCTIONAL_ASSESSMENT: NO/DENIES PAIN
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
## 2025-07-03 ENCOUNTER — APPOINTMENT (OUTPATIENT)
Dept: NEUROLOGY | Facility: CLINIC | Age: 65
End: 2025-07-03
Payer: COMMERCIAL

## 2025-07-03 VITALS
SYSTOLIC BLOOD PRESSURE: 118 MMHG | DIASTOLIC BLOOD PRESSURE: 78 MMHG | HEIGHT: 72 IN | RESPIRATION RATE: 12 BRPM | WEIGHT: 253.3 LBS | BODY MASS INDEX: 34.31 KG/M2 | TEMPERATURE: 97.7 F | HEART RATE: 73 BPM

## 2025-07-03 DIAGNOSIS — Q03.9 CONGENITAL HYDROCEPHALUS: Primary | ICD-10-CM

## 2025-07-03 DIAGNOSIS — R41.3 MEMORY CHANGE: ICD-10-CM

## 2025-07-03 PROCEDURE — 3074F SYST BP LT 130 MM HG: CPT | Performed by: PSYCHIATRY & NEUROLOGY

## 2025-07-03 PROCEDURE — 1036F TOBACCO NON-USER: CPT | Performed by: PSYCHIATRY & NEUROLOGY

## 2025-07-03 PROCEDURE — 99214 OFFICE O/P EST MOD 30 MIN: CPT | Performed by: PSYCHIATRY & NEUROLOGY

## 2025-07-03 PROCEDURE — 1159F MED LIST DOCD IN RCRD: CPT | Performed by: PSYCHIATRY & NEUROLOGY

## 2025-07-03 PROCEDURE — 3078F DIAST BP <80 MM HG: CPT | Performed by: PSYCHIATRY & NEUROLOGY

## 2025-07-03 PROCEDURE — 3008F BODY MASS INDEX DOCD: CPT | Performed by: PSYCHIATRY & NEUROLOGY

## 2025-07-03 RX ORDER — NYSTATIN 100000 U/G
CREAM TOPICAL
COMMUNITY
Start: 2025-06-19

## 2025-07-03 ASSESSMENT — PATIENT HEALTH QUESTIONNAIRE - PHQ9
SUM OF ALL RESPONSES TO PHQ9 QUESTIONS 1 & 2: 0
1. LITTLE INTEREST OR PLEASURE IN DOING THINGS: NOT AT ALL
2. FEELING DOWN, DEPRESSED OR HOPELESS: NOT AT ALL

## 2025-07-03 ASSESSMENT — LIFESTYLE VARIABLES
HOW OFTEN DO YOU HAVE SIX OR MORE DRINKS ON ONE OCCASION: NEVER
HOW MANY STANDARD DRINKS CONTAINING ALCOHOL DO YOU HAVE ON A TYPICAL DAY: PATIENT DOES NOT DRINK
AUDIT-C TOTAL SCORE: 0
HOW OFTEN DO YOU HAVE A DRINK CONTAINING ALCOHOL: NEVER
SKIP TO QUESTIONS 9-10: 1

## 2025-07-03 NOTE — PROGRESS NOTES
Pts wife states that the pt had a emergency shunt replacement surgery in March. She states theres new changes with his memory and she would like to know about memory therapy and seeing if there can be a referral placed for it. Pts wife states the main things she's noticed is he'll say something or talk to someone and then immediately forget what just happened. She states after the shunt surgery it seems like his memory has improved but then it platode and she states this isn't something that happens all the time but about 30% of the time.

## 2025-07-03 NOTE — PROGRESS NOTES
Chief Complaint: Congenital Hydrocephalus    HPI  65 y.o. male presenting for follow up regarding above.    Since the last visit, the patient underwent shunt revision on March 2025.  Following surgery, there was an improvement in memory.  Around June 2025, his wife feels like the memory had plateaud.  More recently, over the past week, he has had issues with forgetting recent events.  For example, the patient was talking to his nephew - moments later, he forgot about the conversations.  The patient denies any issues with misplacing things.    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  He denies any difficulty with driving.     He denies any double vision, slurred speech, facial droop, weakness, numbness, or loss of balance.    He also denies any headaches.        Current Medications[1]      Objective:  Gen: NAD  Neuro:  --HIF:   Mini-Mental Status Exam:  Orientation to Time (Year, Season, Month, Date, Day of Week): 4  Orientation to Place (State, County, City, Name of Place, Floor):  4  Registration (Apple, Table, Leila): 3  Attention (Spell 'World' backwards): 5  Delayed Verbal Recall: 0  Naming (Watch, Pen): 2  Repetition (No Ifs, Ands, or Buts): 1  Follows command with crossover: 3  Read a sentence: 1  Write a sentence: 1  Copy a figure: 1  Total Score: 25    --CN:  PERRLA, EOMI, VFF, no visible facial asymmetry, facial sensation intact, no tongue or palatal deviation, SCM intact  --Motor: Moves all 4 extremities equally; no focal deficits  --Sensory: Intact to light touch, intact to pinprick  --Reflex: 2+ symmetric, toes down  --Cerebellum: FTN and HTS intact  --Gait: Normal, narrow based gait    Relevant Results  CT Head (4/24/2025):  FINDINGS:   No mass or acute hemorrhage. No evidence of acute infarct.     Severe hydrocephalus with grossly stable anterior biventricular diameter measuring up to 6.9 cm. There is more pronounced dilation of the posterior ventricular system,  also similar. Similar appearance of a right posterior shunt with resolution of the previously noted pneumoventricle. Stable CSF collection in the posterior left posterior fossa likely a hygroma.     The skull is intact apart from scattered levels. Mucous retention cysts in the sinuses.  Trace left mastoid fluid.     IMPRESSION:   Grossly stable caliber of the shunted severe hydrocephalus with resolved pneumoventricle.       Neurosurgery Note from 6/16/2025 at [facility]:  65-year-old male, follow-up congenital hydrocephalus status post right posterior approach  shunt revision on 3/26/2025. It did take some time for him to eventually adjust to the new shunt system being placed. He is currently doing well with the valve set at 20 cm. He has returned to his baseline and previously noted symptoms have largely resolved. Plan to follow-up in 1-2 years with a CT head/shunt series. He may also returned to work without restrictions.     Assessment:    Congenital Hydrocephalus s/p VPS revision on March 2025  Memory Difficulty - the patient had an improvement in memory following; over the last week, he has returned to work - since then, he has had increasing difficulty with forgetting recent conversations; on exam, his MMSE was 25/30    Plan:  - I suspect his recent memory difficulty may be due to his recent transition back to work  - recommend patient message me in 4 weeks with an update; if no improvement, will recommend repeat head CT  - encouraged patient to participate in mentally stimulating activities (i.e. crossword puzzles, sudoku puzzles, etc)  - encouraged physical exercise (which has been shown to be beneficial for memory health)  - recommend mediterranean diet  - follow up in 1 year    Jared Guzman MD  [facility]  Department of Neurology         [1]   Current Outpatient Medications:     atorvastatin (Lipitor) 40 mg tablet, Take 1 tablet (40 mg) by mouth once daily., Disp: , Rfl:      chlorthalidone (Hygroton) 25 mg tablet, Take 1 tablet (25 mg) by mouth once daily., Disp: , Rfl:     cholecalciferol, vitD3,/vit K2 (VITAMIN D3-VITAMIN K2 ORAL), Take 1 tablet by mouth once daily., Disp: , Rfl:     DIETARY SUPPLEMENT ORAL, Take 1 capsule by mouth once daily. probiotic, Disp: , Rfl:     fluticasone (Flonase) 50 mcg/actuation nasal spray, Administer 1 spray into each nostril once daily., Disp: , Rfl:     fluticasone propion-salmeteroL (Advair Diskus) 250-50 mcg/dose diskus inhaler, , Disp: , Rfl:     glucosamine-chondroitin 500-400 mg tablet, Take 1 tablet by mouth once daily., Disp: , Rfl:     ketoconazole (NIZOral) 2 % shampoo, 1 application daily when washing hair, Disp: , Rfl:     metFORMIN  mg 24 hr tablet, Take 1 tablet (500 mg) by mouth once daily. Do not crush, chew, or split., Disp: , Rfl:     multivitamin tablet, Take 1 tablet by mouth once daily., Disp: , Rfl:     nystatin (Mycostatin) cream, APPLY TWICE DAILY TO RASH AS NEEDED FOR FLARES, Disp: , Rfl:     omega 3-dha-epa-fish oil (Fish OiL) 1,200 (144-216) mg capsule, Take 1 capsule (1,200 mg) by mouth once daily., Disp: , Rfl:     pyridoxine (Vitamin B-6) 50 mg tablet, Take 1 tablet (50 mg) by mouth once daily., Disp: , Rfl:     ramipril (Altace) 10 mg capsule, Take 1 capsule (10 mg) by mouth once daily., Disp: , Rfl:     tacrolimus (Protopic) 0.1 % ointment, Apply 1 Application topically if needed., Disp: , Rfl:     thiamine (Vitamin B-1) 500 mg tablet, Take 1 tablet (500 mg) by mouth once daily., Disp: , Rfl:     verapamil SR (Calan-SR) 240 mg ER tablet, Take 1 tablet (240 mg) by mouth once daily at bedtime. Do not crush or chew., Disp: , Rfl:     cyanocobalamin (Vitamin B-12) 1,000 mcg/mL injection, Inject 1 mL (1,000 mcg) into the muscle every 30 (thirty) days. Given 10/9/24 (Patient not taking: Reported on 7/3/2025), Disp: , Rfl:     gabapentin (Neurontin) 300 mg capsule, TAKE 1 CAPSULE BY MOUTH WITH GABAPENTIN 600MG AT  BEDTIME. (Patient not taking: Reported on 7/3/2025), Disp: , Rfl:     gabapentin (Neurontin) 600 mg tablet, Take by mouth. TAKE 1 TABLET BY MOUTH WITH GABAPENTIN 300MG AT BEDTIME (Patient not taking: Reported on 7/3/2025), Disp: , Rfl:     ketoconazole (NIZOral) 2 % cream, , Disp: , Rfl:     lidocaine (Lidoderm) 5 % patch, , Disp: , Rfl:     ondansetron (Zofran) 4 mg tablet, Take 1 tablet (4 mg) by mouth every 8 hours if needed for nausea or vomiting. (Patient not taking: Reported on 7/3/2025), Disp: 20 tablet, Rfl: 0    tamsulosin (Flomax) 0.4 mg 24 hr capsule, Take 1 capsule (0.4 mg) by mouth once daily. (Patient not taking: Reported on 7/3/2025), Disp: 90 capsule, Rfl: 3    triamcinolone (Kenalog) 0.1 % ointment, Apply 1 Application topically once daily. (Patient not taking: Reported on 7/3/2025), Disp: , Rfl:

## 2025-07-10 NOTE — PROGRESS NOTES
Nutrition Initial Assessment:     Patient Otto Yen is a 65 y.o. male being seen at [facility] who was referred by Dr. Felicia Garcia on 6/3/25 for   1. Chronic kidney disease, unspecified CKD stage        2. Chronic kidney disease, unspecified  Referral to Nutrition Services          Nutrition Assessment    Problem List[1]      Nutrition History:  Food & Nutrition History: Otto Yen is a pleasant gentleman who presents with his caregiver, Lesly. His wife passed a few years ago from cancer. Lives alone in a condo near [facility]. He enjoys being social and going to restaurants. They would like to work on his kidney health and ensure he is making healthy choices while still maintaining his social life. He does not do much cooking but does seem open to learning more. Does keep [employer] frozen meals stocked but sometimes forgets about them. He also forgets to stay hydrated.     Allergies: Nuts  Intolerance: None  Appetite: Good  Intake: >75%  GI Symptoms : diarrhea  Swallowing Difficulty: No problems with swallowing  Dentition : own  Food Preparation: Patient and Caregiver  Cooking Skills/Barriers: None reported  Grocery Shopping: Patient  Supplements: MVI, vitamin B1 500 mg, vitamin B12 1000 mcg, vitamin D/vitamin K2, vitamin B6 50 mg, omega 3 1200 mg, glucosamine daily  Food Insecurity: Denies     24 Hour Diet Recall  Meal 1: often skips   If eating at home, then oatmeal   Lives near [facility] so sometimes bagels   Meal 2: subway   Meal 3: salad with bologna     Beverages: water, water, occasional pop   Eating out: frequent   Alcohol Intake: limited     Physical Activity:   Walking at [facility]   Thinking about biking   Starting with a  next week       Anthropometrics:  Ht Readings from Last 1 Encounters:   07/11/25 1.829 m (6')     BMI Readings from Last 1 Encounters:   07/11/25 34.35 kg/m²     Weight History:   Daily Weight  07/03/25 : 115 kg (253 lb 4.8 oz)  02/12/25 : 107 kg  (235 lb)  01/15/25 : 107 kg (235 lb)  10/31/24 : 101 kg (222 lb 10.6 oz)  10/17/24 : 101 kg (223 lb 1.7 oz)  08/26/24 : 90.3 kg (199 lb)  08/22/24 : 90.7 kg (200 lb)  08/20/24 : 107 kg (235 lb 7.2 oz)  08/15/24 : 101 kg (222 lb 0.1 oz)  07/31/24 : 97.1 kg (214 lb)    Weight Change %:  Weight History / % Weight Change: Wt gain of 21 lbs (14%) x 9 months from 10/31/24 to 7/3/25    Nutrition Focused Physical Exam Findings:  Subcutaneous Fat Loss:   Defer Subcutaneous Fat Loss Assessment: Defer all  Defer All Reason: Visually appears well nourished with no signs of noticeable wasting    Muscle Wasting:  Defer Muscle Wasting Assessment: Defer all  Defer All Reason: Visually appears well nourished with no signs of noticeable wasting      Nutrition Significant Labs:  RFP + Serum Mag Trend:   Recent Labs     12/03/24  1325 10/17/24  1028 08/22/24  1614 08/21/24  0536 08/12/24  1712 06/10/24  1658   GLUCOSE 102* 98 94   < > 94 81    137 133*   < > 140 142   K 4.3 4.4 4.3   < > 4.4 4.2    104 100   < > 104 107   CO2 26 25 26   < > 27 25   ANIONGAP 11 12 11   < > 13 14   BUN 32* 35* 35*   < > 38* 36*   CREATININE 1.28 1.29 1.39*   < > 1.47* 1.43*   EGFR 62 62 57*   < > 53* 55*   CALCIUM 9.6 9.3 8.4*   < > 9.6 9.6   PHOS 3.7  --   --   --   --   --    ALBUMIN 4.4  --   --   --  4.4 4.4   MG 2.07  --  1.70  --  1.89  --     < > = values in this interval not displayed.     DM Specific Labs Trend (Includes HgbA1C, antibodies & fasting insulin):   Recent Labs     03/18/25  0736 12/03/24  1325 07/25/24  1435   HGBA1C 6* 5.5 6.0*     Vitamin D:   Lab Results   Component Value Date    VITD25 72 12/03/2024        Medications:  Current Outpatient Medications   Medication Instructions    atorvastatin (LIPITOR) 40 mg, Daily    chlorthalidone (Hygroton) 25 mg tablet Take 1 tablet (25 mg) by mouth once daily.    cholecalciferol, vitD3,/vit K2 (VITAMIN D3-VITAMIN K2 ORAL) 1 tablet, Daily    cyanocobalamin (VITAMIN B-12) 1,000  mcg, Every 30 days    DIETARY SUPPLEMENT ORAL 1 capsule, Daily    fluticasone (Flonase) 50 mcg/actuation nasal spray 1 spray, Daily    fluticasone propion-salmeteroL (Advair Diskus) 250-50 mcg/dose diskus inhaler     gabapentin (Neurontin) 300 mg capsule TAKE 1 CAPSULE BY MOUTH WITH GABAPENTIN 600MG AT BEDTIME.    gabapentin (Neurontin) 600 mg tablet Take by mouth. TAKE 1 TABLET BY MOUTH WITH GABAPENTIN 300MG AT BEDTIME    glucosamine-chondroitin 500-400 mg tablet 1 tablet, Daily    ketoconazole (NIZOral) 2 % cream     ketoconazole (NIZOral) 2 % shampoo 1 application daily when washing hair    lidocaine (Lidoderm) 5 % patch     metFORMIN XR (GLUCOPHAGE-XR) 500 mg, Daily    multivitamin tablet 1 tablet, Daily    nystatin (Mycostatin) cream APPLY TWICE DAILY TO RASH AS NEEDED FOR FLARES    omega 3-dha-epa-fish oil (Fish OiL) 1,200 (144-216) mg capsule 1 capsule, Daily    ondansetron (ZOFRAN) 4 mg, oral, Every 8 hours PRN    pyridoxine (VITAMIN B-6) 50 mg, Daily    ramipril (ALTACE) 10 mg, Daily    tacrolimus (Protopic) 0.1 % ointment 1 Application, As needed    tamsulosin (FLOMAX) 0.4 mg, oral, Daily    thiamine (VITAMIN B-1) 500 mg, Daily    triamcinolone (Kenalog) 0.1 % ointment 1 Application, Daily    verapamil SR (CALAN-SR) 240 mg, Nightly        Estimated Needs:  Total Energy Estimated Needs in 24 hours (kCal): 1850 kCal;   Method for Estimating Needs: MSJ 1971 x 1.2 - 500 (for weight management)  Total Protein Estimated Needs in 24 Hours (g): 90 g;   Method for Estimating 24 Hour Protein Needs: 0.8 g/kg   ;     ;                    Nutrition Diagnosis        Nutrition Diagnosis  Patient has Nutrition Diagnosis: Yes  Diagnosis Status (1): New  Nutrition Diagnosis 1: Food and nutrition related knowledge deficit  Related to (1): lack of adequate prior exposure to information  As Evidenced by (1): patient has questions about changing diet.  Additional Nutrition Diagnosis: Diagnosis 2  Diagnosis Status (2):  New  Nutrition Diagnosis 2: Altered nutrition related to laboratory values  Related to (2): prediabetes  As Evidenced by (2): A1c = 6.0% (3/18/25)       Nutrition Interventions/Recommendations   Nutrition Prescription: Oral nutrition heart healthy diet and adequate hydration for CKD    Nutrition Interventions:   Food and Nutrient Delivery: Meals & Snacks: Fluid-modified diet, Mineral-modified diet  Goals: 1) 2000 mg sodium or less  2) Hydration per MD recs (9, 8 ounce glasses of water)     Coordination of Care: Goals: N/A     Nutrition Education:   Content related nutrition education  Provided education on recommendations for chronic kidney disease:  Discussed importance of limiting sodium to 2000 mg daily or less or about 600 mg sodium per meal. Demonstrated how to view nutrition information for restaurants to help guide decisions. Recommended asking for seasonings on the side when able.   Reiterated patient's primary care's recommendation for staying hydrated. We talked about different ideas to remind patient such as a phone mavis or a cup that has mL markers. Discussed that he can use flavor packets such as half Crystal Light or a full True Lemon packet.   We discussed increasing non-starchy vegetables in his diet (any vegetable except for potatoes, peas, and corn as these should be thought of as starches). Recommended purchasing steam in bag vegetables that can be microwaved.   Discussed making the majority of proteins lean proteins and limiting red meat to 1-2x per week.    Discussed creating a schedule for dinner meals and to keep it in plain sight to help with adherence to a plan.   Provided resources for learning to cook meals: [email or web address].org and [email or web address].     Educational Handouts Provided: BARRON     Nutrition Counseling:   Nutrition Counseling Strategies : Nutrition counseling based on motivational interviewing strategy, Nutrition counseling based on goal setting strategy    Patient Goals:     For kidney health, aim to keep sodium less than 2000 mg per day   Aim for 600 mg sodium per meal or less   At restaurants, you can ask for seasonings on the side.   To stay hydrated:  Think about getting the plant mavis   Cup with mL markers  Can flavor with half packet crystal light, True Lemon packet   Start with purchasing frozen vegetables that can be steamed in the bag  Avoid counting potatoes, corn, and peas as a vegetable   Avoid salty sauces   Aim for leaner proteins such as chicken, turkey, fish and limit beef and pork to 1-2x per week   Create a schedule for your dinner meals and place it in plain sight     Readiness to Change : Good  Level of Understanding : Good  Anticipated Compliant : Good         Nutrition Monitoring and Evaluation   Food and Nutrient Intake  Monitoring and Evaluation Plan: Meal/snack pattern  Meal/Snack Pattern: Estimated meal and snack pattern  Criteria: Monitor patient's progress towards meal and snack goal(s)         Anthropometric measurements  Monitoring and Evaluation Plan: Weight  Body Weight: Measured body weight  Criteria: Monitor patient's progress towards intentional weight loss of 0.5-2 lb per week, trending toward a clinically significant weight loss of 5-10% of current body weight.    Biochemical Data, Medical Tests and Procedures  Monitoring and Evaluation Plan: Glucose/endocrine profile  Glucose/Endocrine Profile: Hemoglobin A1c (HgbA1c)  Criteria: <5.7%         Goal Status: Goal Status: New goal identified         Follow Up: Patient and caregiver prefer frequent follow-up; scheduled FUV for 2 weeks on 7/22/25 at 8 am               [1]   Patient Active Problem List  Diagnosis    Unilateral primary osteoarthritis, right hip    MARCELA (obstructive sleep apnea)    Chronic renal insufficiency    HTN (hypertension)    Hyperlipidemia    Anemia    Hydrocephalus    Cerebral brain hemorrhage (Multi)    Unilateral primary osteoarthritis, left hip    S/P total left hip  arthroplasty

## 2025-07-11 ENCOUNTER — NUTRITION (OUTPATIENT)
Dept: NUTRITION | Facility: CLINIC | Age: 65
End: 2025-07-11
Payer: COMMERCIAL

## 2025-07-11 VITALS — BODY MASS INDEX: 34.35 KG/M2 | HEIGHT: 72 IN

## 2025-07-11 DIAGNOSIS — N18.9 CHRONIC KIDNEY DISEASE, UNSPECIFIED: ICD-10-CM

## 2025-07-11 DIAGNOSIS — N18.9 CHRONIC KIDNEY DISEASE, UNSPECIFIED CKD STAGE: Primary | ICD-10-CM

## 2025-07-11 PROCEDURE — 97802 MEDICAL NUTRITION INDIV IN: CPT

## 2025-07-11 NOTE — PATIENT INSTRUCTIONS
For kidney health, aim to keep sodium less than 2000 mg per day   Aim for 600 mg sodium per meal or less   At restaurants, you can ask for seasonings on the side.   To stay hydrated:  Think about getting the plant mavis   Cup with mL markers  Can flavor with half packet crystal light, True Lemon packet   Start with purchasing frozen vegetables that can be steamed in the bag  Avoid counting potatoes, corn, and peas as a vegetable   Avoid salty sauces   Aim for leaner proteins such as chicken, turkey, fish and limit beef and pork to 1-2x per week   Create a schedule for your dinner meals and place it in plain sight     For learning new recipes: [email or web address].org and [email or web address]

## 2025-07-18 ENCOUNTER — PATIENT MESSAGE (OUTPATIENT)
Dept: NEUROLOGY | Facility: CLINIC | Age: 65
End: 2025-07-18
Payer: COMMERCIAL

## 2025-07-18 DIAGNOSIS — Q03.9 CONGENITAL HYDROCEPHALUS: Primary | ICD-10-CM

## 2025-07-22 ENCOUNTER — APPOINTMENT (OUTPATIENT)
Dept: NUTRITION | Facility: CLINIC | Age: 65
End: 2025-07-22
Payer: COMMERCIAL

## 2025-07-22 DIAGNOSIS — N18.9 CHRONIC KIDNEY DISEASE, UNSPECIFIED CKD STAGE: Primary | ICD-10-CM

## 2025-07-22 PROCEDURE — 97803 MED NUTRITION INDIV SUBSEQ: CPT

## 2025-07-22 NOTE — PROGRESS NOTES
Nutrition Follow-up:     Patient Otto Yen is a 65 y.o. male being seen at [facility] who was referred by Dr. Felicia Garcia on 6/3/25 for   1. Chronic kidney disease, unspecified CKD stage            Nutrition Assessment    Problem List[1]    Visit 1: 7/22/25      Nutrition History:  Food & Nutrition History: Otto Yen is a pleasant gentleman who presents with his caregiver, Lesly. After the last visit, he started paying attention to sodium more closely by reading labels, purchasing low sodium seasonings. Purchased grill. Patient and caregiver are feeling overwhelmed about all the changes and feel they may need to prioritize other areas of his medical care right now.     Allergies: Nuts  Intolerance: None  Appetite: Good  Intake: >75%  GI Symptoms : diarrhea  Swallowing Difficulty: No problems with swallowing  Dentition : own  Food Preparation: Patient and Caregiver  Cooking Skills/Barriers: None reported  Grocery Shopping: Patient  Supplements: MVI, vitamin B1 500 mg, vitamin B12 1000 mcg, vitamin D/vitamin K2, vitamin B6 50 mg, omega 3 1200 mg, glucosamine daily  Food Insecurity: Denies     Physical Activity:   Walking at [employer]   Thinking about biking   Starting with a  next week     BP Readings from Last 10 Encounters:   07/03/25 118/78   01/15/25 166/80   11/14/24 114/64   11/11/24 124/72   11/07/24 130/80   11/04/24 134/78   11/02/24 138/80   11/01/24 134/65   10/17/24 155/83   09/03/24 154/82     Anthropometrics:  Ht Readings from Last 1 Encounters:   07/11/25 1.829 m (6')     BMI Readings from Last 1 Encounters:   07/11/25 34.35 kg/m²     Weight History:   Daily Weight  07/03/25 : 115 kg (253 lb 4.8 oz)  02/12/25 : 107 kg (235 lb)  01/15/25 : 107 kg (235 lb)  10/31/24 : 101 kg (222 lb 10.6 oz)  10/17/24 : 101 kg (223 lb 1.7 oz)  08/26/24 : 90.3 kg (199 lb)  08/22/24 : 90.7 kg (200 lb)  08/20/24 : 107 kg (235 lb 7.2 oz)  08/15/24 : 101 kg (222 lb 0.1 oz)  07/31/24 : 97.1  kg (214 lb)    Weight Change %:  Weight History / % Weight Change: No update since last visit    Nutrition Focused Physical Exam Findings:  Subcutaneous Fat Loss:   Defer Subcutaneous Fat Loss Assessment: Defer all  Defer All Reason: Visually appears well nourished with no signs of noticeable wasting    Muscle Wasting:  Defer Muscle Wasting Assessment: Defer all  Defer All Reason: Visually appears well nourished with no signs of noticeable wasting      Nutrition Significant Labs:  RFP + Serum Mag Trend:   Recent Labs     12/03/24  1325 10/17/24  1028 08/22/24  1614 08/21/24  0536 08/12/24  1712 06/10/24  1658   GLUCOSE 102* 98 94   < > 94 81    137 133*   < > 140 142   K 4.3 4.4 4.3   < > 4.4 4.2    104 100   < > 104 107   CO2 26 25 26   < > 27 25   ANIONGAP 11 12 11   < > 13 14   BUN 32* 35* 35*   < > 38* 36*   CREATININE 1.28 1.29 1.39*   < > 1.47* 1.43*   EGFR 62 62 57*   < > 53* 55*   CALCIUM 9.6 9.3 8.4*   < > 9.6 9.6   PHOS 3.7  --   --   --   --   --    ALBUMIN 4.4  --   --   --  4.4 4.4   MG 2.07  --  1.70  --  1.89  --     < > = values in this interval not displayed.     DM Specific Labs Trend (Includes HgbA1C, antibodies & fasting insulin):   Recent Labs     03/18/25  0736 12/03/24  1325 07/25/24  1435   HGBA1C 6* 5.5 6.0*     Vitamin D:   Lab Results   Component Value Date    VITD25 72 12/03/2024        Medications:  Current Outpatient Medications   Medication Instructions    atorvastatin (LIPITOR) 40 mg, Daily    chlorthalidone (Hygroton) 25 mg tablet Take 1 tablet (25 mg) by mouth once daily.    cholecalciferol, vitD3,/vit K2 (VITAMIN D3-VITAMIN K2 ORAL) 1 tablet, Daily    cyanocobalamin (VITAMIN B-12) 1,000 mcg, Every 30 days    DIETARY SUPPLEMENT ORAL 1 capsule, Daily    fluticasone (Flonase) 50 mcg/actuation nasal spray 1 spray, Daily    fluticasone propion-salmeteroL (Advair Diskus) 250-50 mcg/dose diskus inhaler     gabapentin (Neurontin) 300 mg capsule TAKE 1 CAPSULE BY MOUTH WITH  GABAPENTIN 600MG AT BEDTIME.    gabapentin (Neurontin) 600 mg tablet Take by mouth. TAKE 1 TABLET BY MOUTH WITH GABAPENTIN 300MG AT BEDTIME    glucosamine-chondroitin 500-400 mg tablet 1 tablet, Daily    ketoconazole (NIZOral) 2 % cream     ketoconazole (NIZOral) 2 % shampoo 1 application daily when washing hair    lidocaine (Lidoderm) 5 % patch     metFORMIN XR (GLUCOPHAGE-XR) 500 mg, Daily    multivitamin tablet 1 tablet, Daily    nystatin (Mycostatin) cream APPLY TWICE DAILY TO RASH AS NEEDED FOR FLARES    omega 3-dha-epa-fish oil (Fish OiL) 1,200 (144-216) mg capsule 1 capsule, Daily    ondansetron (ZOFRAN) 4 mg, oral, Every 8 hours PRN    pyridoxine (VITAMIN B-6) 50 mg, Daily    ramipril (ALTACE) 10 mg, Daily    tacrolimus (Protopic) 0.1 % ointment 1 Application, As needed    tamsulosin (FLOMAX) 0.4 mg, oral, Daily    thiamine (VITAMIN B-1) 500 mg, Daily    triamcinolone (Kenalog) 0.1 % ointment 1 Application, Daily    verapamil SR (CALAN-SR) 240 mg, Nightly        Estimated Needs:  Total Energy Estimated Needs in 24 hours (kCal): 1850 kCal;   Method for Estimating Needs: MSJ 1971 x 1.2 - 500 (for weight management)  Total Protein Estimated Needs in 24 Hours (g): 90 g;   Method for Estimating 24 Hour Protein Needs: 0.8 g/kg   ;     ;                    Nutrition Diagnosis        Nutrition Diagnosis  Patient has Nutrition Diagnosis: Yes  Diagnosis Status (1): Active  Nutrition Diagnosis 1: Food and nutrition related knowledge deficit  Related to (1): lack of adequate prior exposure to information  As Evidenced by (1): patient has questions about changing diet.  Additional Nutrition Diagnosis: Diagnosis 2  Diagnosis Status (2): Active  Nutrition Diagnosis 2: Altered nutrition related to laboratory values  Related to (2): prediabetes  As Evidenced by (2): A1c = 6.0% (3/18/25)       Nutrition Interventions/Recommendations   Nutrition Prescription: Oral nutrition heart healthy diet and adequate hydration for  CKD    Nutrition Interventions:   Food and Nutrient Delivery: Meals & Snacks: Fluid-modified diet, Mineral-modified diet  Goals: 1) 2000 mg sodium or less  2) Hydration per MD recs (9, 8 ounce glasses of water)     Coordination of Care: Goals: N/A     Nutrition Education:   Content related nutrition education  Congratulated patient and caregiver on making changes to improve diet.   Discussed simplifying goals.     Educational Handouts Provided: N/A, pt has educational material from last visit     Nutrition Counseling:   Nutrition Counseling Strategies : Nutrition counseling based on motivational interviewing strategy, Nutrition counseling based on goal setting strategy    Patient Goals:    1) Aim to get the grill set up       Readiness to Change : Good  Level of Understanding : Good  Anticipated Compliant : Good         Nutrition Monitoring and Evaluation   Food and Nutrient Intake  Monitoring and Evaluation Plan: Meal/snack pattern  Meal/Snack Pattern: Estimated meal and snack pattern  Criteria: Monitor patient's progress towards meal and snack goal(s)         Anthropometric measurements  Monitoring and Evaluation Plan: Weight  Body Weight: Measured body weight  Criteria: Monitor patient's progress towards intentional weight loss of 0.5-2 lb per week, trending toward a clinically significant weight loss of 5-10% of current body weight.    Biochemical Data, Medical Tests and Procedures  Monitoring and Evaluation Plan: Glucose/endocrine profile  Glucose/Endocrine Profile: Hemoglobin A1c (HgbA1c)  Criteria: <5.7%         Goal Status: Goal Status: Some progress toward goal         Follow Up: Follow-up as needed               [1]   Patient Active Problem List  Diagnosis    Unilateral primary osteoarthritis, right hip    MARCELA (obstructive sleep apnea)    Chronic renal insufficiency    HTN (hypertension)    Hyperlipidemia    Anemia    Hydrocephalus    Cerebral brain hemorrhage (Multi)    Unilateral primary osteoarthritis,  left hip    S/P total left hip arthroplasty

## 2025-07-31 ENCOUNTER — TELEPHONE (OUTPATIENT)
Dept: NEUROLOGY | Facility: CLINIC | Age: 65
End: 2025-07-31
Payer: COMMERCIAL

## 2025-07-31 NOTE — TELEPHONE ENCOUNTER
Patient has a CT that you ordered. He already completed the CT at [facility] but 's automated system keeps calling to set up the test. Lesly would like to know if you can take the order out of his chart so they do not receive calls.

## 2025-08-05 ENCOUNTER — OFFICE VISIT (OUTPATIENT)
Dept: NEUROLOGY | Facility: CLINIC | Age: 65
End: 2025-08-05
Payer: COMMERCIAL

## 2025-08-05 VITALS
SYSTOLIC BLOOD PRESSURE: 112 MMHG | HEIGHT: 72 IN | DIASTOLIC BLOOD PRESSURE: 62 MMHG | WEIGHT: 255.9 LBS | BODY MASS INDEX: 34.66 KG/M2 | TEMPERATURE: 97.6 F | HEART RATE: 63 BPM

## 2025-08-05 DIAGNOSIS — Q03.9 CONGENITAL HYDROCEPHALUS: Primary | ICD-10-CM

## 2025-08-05 DIAGNOSIS — F03.90 DEMENTIA WITHOUT BEHAVIORAL DISTURBANCE, PSYCHOTIC DISTURBANCE, MOOD DISTURBANCE, OR ANXIETY, UNSPECIFIED DEMENTIA SEVERITY, UNSPECIFIED DEMENTIA TYPE: ICD-10-CM

## 2025-08-05 PROCEDURE — 1036F TOBACCO NON-USER: CPT | Performed by: PSYCHIATRY & NEUROLOGY

## 2025-08-05 PROCEDURE — 1159F MED LIST DOCD IN RCRD: CPT | Performed by: PSYCHIATRY & NEUROLOGY

## 2025-08-05 PROCEDURE — 99214 OFFICE O/P EST MOD 30 MIN: CPT | Performed by: PSYCHIATRY & NEUROLOGY

## 2025-08-05 PROCEDURE — 3074F SYST BP LT 130 MM HG: CPT | Performed by: PSYCHIATRY & NEUROLOGY

## 2025-08-05 PROCEDURE — 3078F DIAST BP <80 MM HG: CPT | Performed by: PSYCHIATRY & NEUROLOGY

## 2025-08-05 PROCEDURE — 3008F BODY MASS INDEX DOCD: CPT | Performed by: PSYCHIATRY & NEUROLOGY

## 2025-08-05 RX ORDER — DAPAGLIFLOZIN 10 MG/1
5 TABLET, FILM COATED ORAL EVERY 24 HOURS
COMMUNITY

## 2025-08-05 ASSESSMENT — LIFESTYLE VARIABLES
HOW OFTEN DO YOU HAVE A DRINK CONTAINING ALCOHOL: NEVER
HOW OFTEN DO YOU HAVE SIX OR MORE DRINKS ON ONE OCCASION: NEVER
AUDIT-C TOTAL SCORE: 0
HOW MANY STANDARD DRINKS CONTAINING ALCOHOL DO YOU HAVE ON A TYPICAL DAY: PATIENT DOES NOT DRINK
SKIP TO QUESTIONS 9-10: 1

## 2025-08-05 NOTE — PROGRESS NOTES
Chief Complaint: Congenital Hydrocephalus    HPI  65 y.o. male presenting for follow up regarding above.    Since the last visit, the patient continues to have memory difficulty.  Specifically, at work, his colleagues have expressed concern.  For example, he will ask questions at work about things that they haven't done in years.  Other times, he will see someone at work - 15 minutes later, he will ask if that person came to work today.  His wife notes that he frequently forgets recent conversations.  In addition, the patient has been having anger outburst.  His caregiver notes that when something does not go as planned, he will get upset.  He will start yelling.      Of note, the patient used to be a heavy drinker.  From December 2023 to December 2024, he stopped drinking.  From December 2024 to present time, he has been drinking off and on.      His caregiver notes that his balance is off.      His caregiver manages bills/finances and medications.      Current Medications[1]      Objective:  /62 (BP Location: Left arm, Patient Position: Sitting, BP Cuff Size: Adult)   Pulse 63   Temp 36.4 °C (97.6 °F) (Temporal)   Ht 1.829 m (6')   Wt 116 kg (255 lb 14.4 oz)   BMI 34.71 kg/m²     Gen: NAD  Neuro:  --HIF:   Mini-Mental Status Exam:  Orientation to Time (Year, Season, Month, Date, Day of Week): 5  Orientation to Place (State, County, City, Name of Place, Floor):  4  Registration (Apple, Table, Leila): 3  Attention (Spell 'World' backwards): 5  Delayed Verbal Recall: 0  Naming (Watch, Pen): 2  Repetition (No Ifs, Ands, or Buts): 1  Follows command with crossover: 3  Read a sentence: 1  Write a sentence: 1  Copy a figure: 1  Total Score: 26      --CN:  PERRLA, EOMI, VFF, no visible facial asymmetry, facial sensation intact, no tongue or palatal deviation, SCM intact  --Motor: Moves all 4 extremities equally; no focal deficits  --Sensory: Intact to light touch, intact to pinprick  --Reflex: 2+ symmetric,  toes down  --Cerebellum: FTN and HTS intact  --Gait: Mild shuffling gait    Relevant Results  CT Head:  IMPRESSION:   The ventriculomegaly if anything is slightly improved. No evidence of new acute hydrocephalus or of shunt discontinuity..     Vitamin B12 (7/8/2024):  669    Assessment:   This is a 65 year old male presenting for follow up.  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  He has continued cognitive decline.  He is dependent on family for managing bills/finances and medications.  Of note, he does have a history of daily alcohol use.  On exam, his MMSE was 26/30.    Plan:  - recommend MRI Brain (patient's caregiver will first look into whether or not he has metal in his shunt)  - recommend starting Thiamine 100 mg/day  - recommend he stop drinking  - encouraged patient to participate in mentally stimulating activities (i.e. crossword puzzles, sudoku puzzles, etc)  - encouraged physical exercise (which has been shown to be beneficial for memory health)  - recommend mediterranean diet      Jared Guzman MD  [facility]  Department of Neurology         [1]   Current Outpatient Medications:     atorvastatin (Lipitor) 40 mg tablet, Take 1 tablet (40 mg) by mouth once daily., Disp: , Rfl:     chlorthalidone (Hygroton) 25 mg tablet, Take 1 tablet (25 mg) by mouth once daily., Disp: , Rfl:     cholecalciferol, vitD3,/vit K2 (VITAMIN D3-VITAMIN K2 ORAL), Take 1 tablet by mouth once daily., Disp: , Rfl:     dapagliflozin propanediol (Farxiga) 10 mg tablet, Take 0.5 tablets (5 mg) by mouth once every 24 hours., Disp: , Rfl:     DIETARY SUPPLEMENT ORAL, Take 1 capsule by mouth once daily. probiotic, Disp: , Rfl:     fluticasone (Flonase) 50 mcg/actuation nasal spray, Administer 1 spray into each nostril once daily., Disp: , Rfl:     fluticasone propion-salmeteroL (Advair Diskus) 250-50 mcg/dose diskus inhaler, , Disp: , Rfl:     glucosamine-chondroitin 500-400 mg tablet, Take 1  tablet by mouth once daily., Disp: , Rfl:     ketoconazole (NIZOral) 2 % shampoo, 1 application daily when washing hair, Disp: , Rfl:     metFORMIN  mg 24 hr tablet, Take 1 tablet (500 mg) by mouth once daily. Do not crush, chew, or split., Disp: , Rfl:     multivitamin tablet, Take 1 tablet by mouth once daily., Disp: , Rfl:     nystatin (Mycostatin) cream, APPLY TWICE DAILY TO RASH AS NEEDED FOR FLARES, Disp: , Rfl:     omega 3-dha-epa-fish oil (Fish OiL) 1,200 (144-216) mg capsule, Take 1 capsule (1,200 mg) by mouth once daily., Disp: , Rfl:     pyridoxine (Vitamin B-6) 50 mg tablet, Take 1 tablet (50 mg) by mouth once daily., Disp: , Rfl:     ramipril (Altace) 10 mg capsule, Take 1 capsule (10 mg) by mouth once daily., Disp: , Rfl:     tacrolimus (Protopic) 0.1 % ointment, Apply 1 Application topically if needed., Disp: , Rfl:     thiamine (Vitamin B-1) 500 mg tablet, Take 1 tablet (500 mg) by mouth once daily., Disp: , Rfl:     verapamil SR (Calan-SR) 240 mg ER tablet, Take 1 tablet (240 mg) by mouth once daily at bedtime. Do not crush or chew., Disp: , Rfl:     cyanocobalamin (Vitamin B-12) 1,000 mcg/mL injection, Inject 1 mL (1,000 mcg) into the muscle every 30 (thirty) days. Given 10/9/24 (Patient not taking: Reported on 8/5/2025), Disp: , Rfl:     gabapentin (Neurontin) 300 mg capsule, TAKE 1 CAPSULE BY MOUTH WITH GABAPENTIN 600MG AT BEDTIME. (Patient not taking: Reported on 8/5/2025), Disp: , Rfl:     gabapentin (Neurontin) 600 mg tablet, Take by mouth. TAKE 1 TABLET BY MOUTH WITH GABAPENTIN 300MG AT BEDTIME (Patient not taking: Reported on 1/15/2025), Disp: , Rfl:     ketoconazole (NIZOral) 2 % cream, , Disp: , Rfl:     lidocaine (Lidoderm) 5 % patch, , Disp: , Rfl:     ondansetron (Zofran) 4 mg tablet, Take 1 tablet (4 mg) by mouth every 8 hours if needed for nausea or vomiting. (Patient not taking: Reported on 8/5/2025), Disp: 20 tablet, Rfl: 0    tamsulosin (Flomax) 0.4 mg 24 hr capsule, Take 1  capsule (0.4 mg) by mouth once daily. (Patient not taking: Reported on 8/5/2025), Disp: 90 capsule, Rfl: 3    triamcinolone (Kenalog) 0.1 % ointment, Apply 1 Application topically once daily. (Patient not taking: Reported on 1/15/2025), Disp: , Rfl:

## 2025-08-12 ENCOUNTER — APPOINTMENT (OUTPATIENT)
Dept: INTEGRATIVE MEDICINE | Facility: CLINIC | Age: 65
End: 2025-08-12
Payer: COMMERCIAL

## 2025-08-12 VITALS
BODY MASS INDEX: 34.67 KG/M2 | WEIGHT: 256 LBS | SYSTOLIC BLOOD PRESSURE: 139 MMHG | DIASTOLIC BLOOD PRESSURE: 78 MMHG | OXYGEN SATURATION: 96 % | HEIGHT: 72 IN | HEART RATE: 79 BPM

## 2025-08-12 DIAGNOSIS — Z86.39 HX OF NON ANEMIC VITAMIN B12 DEFICIENCY: ICD-10-CM

## 2025-08-12 DIAGNOSIS — G47.33 OBSTRUCTIVE SLEEP APNEA SYNDROME: Primary | ICD-10-CM

## 2025-08-12 DIAGNOSIS — R41.89 COGNITIVE IMPAIRMENT: ICD-10-CM

## 2025-08-12 PROCEDURE — 99205 OFFICE O/P NEW HI 60 MIN: CPT | Performed by: INTERNAL MEDICINE

## 2025-08-12 SDOH — SOCIAL STABILITY: SOCIAL NETWORK: PROMIS ABILITY TO PARTICIPATE IN SOCIAL ROLES & ACTIVITIES T-SCORE: INCOMPLETE

## 2025-08-12 SDOH — ECONOMIC STABILITY: FOOD INSECURITY: WITHIN THE PAST 12 MONTHS, THE FOOD YOU BOUGHT JUST DIDN'T LAST AND YOU DIDN'T HAVE MONEY TO GET MORE.: NEVER TRUE

## 2025-08-12 SDOH — ECONOMIC STABILITY: FOOD INSECURITY: WITHIN THE PAST 12 MONTHS, YOU WORRIED THAT YOUR FOOD WOULD RUN OUT BEFORE YOU GOT MONEY TO BUY MORE.: NEVER TRUE

## 2025-08-12 SDOH — SOCIAL STABILITY: SOCIAL NETWORK: I HAVE TROUBLE DOING ALL OF MY USUAL WORK (INCLUDE WORK AT HOME): NEVER

## 2025-08-12 SDOH — SOCIAL STABILITY: SOCIAL NETWORK

## 2025-08-12 SDOH — SOCIAL STABILITY: SOCIAL NETWORK: I HAVE TROUBLE DOING ALL OF THE ACTIVITIES WITH FRIENDS THAT I WANT TO DO: NEVER

## 2025-08-12 SDOH — SOCIAL STABILITY: SOCIAL NETWORK: I HAVE TROUBLE DOING ALL OF THE FAMILY ACTIVITIES THAT I WANT TO DO: NEVER

## 2025-08-12 ASSESSMENT — PROMIS GLOBAL HEALTH SCALE
CARRYOUT_SOCIAL_ACTIVITIES: VERY GOOD
RATE_GENERAL_HEALTH: FAIR
CARRYOUT_PHYSICAL_ACTIVITIES: MOSTLY
RATE_MENTAL_HEALTH: GOOD
RATE_AVERAGE_PAIN: 2
RATE_PHYSICAL_HEALTH: FAIR
RATE_QUALITY_OF_LIFE: VERY GOOD
EMOTIONAL_PROBLEMS: SOMETIMES
RATE_SOCIAL_SATISFACTION: VERY GOOD

## 2025-08-12 ASSESSMENT — ENCOUNTER SYMPTOMS
APNEA: 0
DIARRHEA: 0
HEADACHES: 1
CONSTIPATION: 0
ABDOMINAL DISTENTION: 0
COUGH: 1
HIP PAIN: 1

## 2025-08-13 LAB
HCYS SERPL-SCNC: 17.5 UMOL/L
VIT B12 SERPL-MCNC: 542 PG/ML (ref 200–1100)

## 2025-08-27 ENCOUNTER — OFFICE VISIT (OUTPATIENT)
Facility: CLINIC | Age: 65
End: 2025-08-27
Payer: COMMERCIAL

## 2025-08-27 VITALS
DIASTOLIC BLOOD PRESSURE: 75 MMHG | WEIGHT: 256 LBS | HEART RATE: 67 BPM | HEIGHT: 72 IN | BODY MASS INDEX: 34.67 KG/M2 | OXYGEN SATURATION: 97 % | TEMPERATURE: 98 F | SYSTOLIC BLOOD PRESSURE: 124 MMHG

## 2025-08-27 DIAGNOSIS — Z78.9 NONSMOKER: ICD-10-CM

## 2025-08-27 DIAGNOSIS — I10 PRIMARY HYPERTENSION: ICD-10-CM

## 2025-08-27 DIAGNOSIS — G47.33 OSA (OBSTRUCTIVE SLEEP APNEA): Primary | ICD-10-CM

## 2025-08-27 DIAGNOSIS — E66.9 OBESITY (BMI 30-39.9): ICD-10-CM

## 2025-08-27 PROCEDURE — 3074F SYST BP LT 130 MM HG: CPT | Performed by: NURSE PRACTITIONER

## 2025-08-27 PROCEDURE — 1036F TOBACCO NON-USER: CPT | Performed by: NURSE PRACTITIONER

## 2025-08-27 PROCEDURE — 3008F BODY MASS INDEX DOCD: CPT | Performed by: NURSE PRACTITIONER

## 2025-08-27 PROCEDURE — 1159F MED LIST DOCD IN RCRD: CPT | Performed by: NURSE PRACTITIONER

## 2025-08-27 PROCEDURE — 3078F DIAST BP <80 MM HG: CPT | Performed by: NURSE PRACTITIONER

## 2025-08-27 PROCEDURE — 1126F AMNT PAIN NOTED NONE PRSNT: CPT | Performed by: NURSE PRACTITIONER

## 2025-08-27 PROCEDURE — 99204 OFFICE O/P NEW MOD 45 MIN: CPT | Performed by: NURSE PRACTITIONER

## 2025-08-27 PROCEDURE — 1160F RVW MEDS BY RX/DR IN RCRD: CPT | Performed by: NURSE PRACTITIONER

## 2025-08-27 ASSESSMENT — SLEEP AND FATIGUE QUESTIONNAIRES
SLEEP_PROBLEM_NOTICEABLE_TO_OTHERS: A LITTLE
HOW LIKELY ARE YOU TO NOD OFF OR FALL ASLEEP WHILE LYING DOWN TO REST IN THE AFTERNOON WHEN CIRCUMSTANCES PERMIT: SLIGHT CHANCE OF DOZING
ESS-CHAD TOTAL SCORE: 2
DIFFICULTY_STAYING_ASLEEP: MILD
SATISFACTION_WITH_CURRENT_SLEEP_PATTERN: SATISFIED
HOW LIKELY ARE YOU TO NOD OFF OR FALL ASLEEP WHILE SITTING QUIETLY AFTER LUNCH WITHOUT ALCOHOL: WOULD NEVER DOZE
SLEEP_PROBLEM_INTERFERES_DAILY_ACTIVITIES: A LITTLE
WAKING_TOO_EARLY: MILD
HOW LIKELY ARE YOU TO NOD OFF OR FALL ASLEEP WHILE SITTING AND READING: WOULD NEVER DOZE
HOW LIKELY ARE YOU TO NOD OFF OR FALL ASLEEP WHILE WATCHING TV: SLIGHT CHANCE OF DOZING
HOW LIKELY ARE YOU TO NOD OFF OR FALL ASLEEP IN A CAR, WHILE STOPPED FOR A FEW MINUTES IN TRAFFIC: WOULD NEVER DOZE
HOW LIKELY ARE YOU TO NOD OFF OR FALL ASLEEP WHILE SITTING AND TALKING TO SOMEONE: WOULD NEVER DOZE
WORRIED_DISTRESSED_DUE_TO_SLEEP: A LITTLE
HOW LIKELY ARE YOU TO NOD OFF OR FALL ASLEEP WHEN YOU ARE A PASSENGER IN A CAR FOR AN HOUR WITHOUT A BREAK: WOULD NEVER DOZE
SITING INACTIVE IN A PUBLIC PLACE LIKE A CLASS ROOM OR A MOVIE THEATER: WOULD NEVER DOZE

## 2025-08-27 ASSESSMENT — PATIENT HEALTH QUESTIONNAIRE - PHQ9
2. FEELING DOWN, DEPRESSED OR HOPELESS: NOT AT ALL
1. LITTLE INTEREST OR PLEASURE IN DOING THINGS: NOT AT ALL
SUM OF ALL RESPONSES TO PHQ9 QUESTIONS 1 AND 2: 0

## 2025-08-27 ASSESSMENT — PAIN SCALES - GENERAL: PAINLEVEL_OUTOF10: 0-NO PAIN

## 2025-11-12 ENCOUNTER — APPOINTMENT (OUTPATIENT)
Dept: INTEGRATIVE MEDICINE | Facility: CLINIC | Age: 65
End: 2025-11-12
Payer: COMMERCIAL

## 2025-12-22 ENCOUNTER — APPOINTMENT (OUTPATIENT)
Dept: NEUROLOGY | Facility: CLINIC | Age: 65
End: 2025-12-22
Payer: COMMERCIAL

## 2026-07-06 ENCOUNTER — APPOINTMENT (OUTPATIENT)
Dept: NEUROLOGY | Facility: CLINIC | Age: 66
End: 2026-07-06
Payer: COMMERCIAL

## (undated) DEVICE — HIGH FLOW TIP FOR INTERPULSE HANDPIECE SET

## (undated) DEVICE — SUTURE, VICRYL, 3-0, 27 IN, SH

## (undated) DEVICE — HOOD, SURGICAL, FLYTE SURGICOOL

## (undated) DEVICE — DRESSING, MEPILEX BORDER, POST-OP AG, 4 X 6 IN

## (undated) DEVICE — DRAPE, INCISE, ANTIMICROBIAL, IOBAN 2, STERI DRAPE, 23 X 33 IN, DISPOSABLE, STERILE

## (undated) DEVICE — WOUND SYSTEM, DEBRIDEMENT & CLEANING, O.R DUOPAK

## (undated) DEVICE — SUTURE, VICRYL, 3-0, 54 IN, TIE, VIOLET

## (undated) DEVICE — GLOVE, PROTEXIS PI CLASSIC, SZ-7.0, PF, LF

## (undated) DEVICE — STAPLER, SKIN, FIXED HEAD, WIDE, 35

## (undated) DEVICE — POSITION KIT, HANA PROFX SPINE

## (undated) DEVICE — SUTURE, STRATAFIX, SPIRAL MONOCRYL PLUS, 3-0, PS-1 45CM, UNDYED

## (undated) DEVICE — DRAPE, ISOLATION, ANTIMICROBIAL, W/POUCH, IOBAN 2, STERI DRAPE, 125 X 83 IN, DISPOSABLE, STERILE

## (undated) DEVICE — DRAPE, SHEET, THREE QUARTER, FAN FOLD, 57 X 77 IN

## (undated) DEVICE — ELECTRODE, ELECTROSURGICAL, BLADE, STANDARD, 2.75 IN

## (undated) DEVICE — INTERPULSE HANDPIECE SET W/ 10FT SUCTION TUBING

## (undated) DEVICE — SUTURE, ETHIBOND, 5, 30 IN, V40, MULTIPACK, GREEN

## (undated) DEVICE — COVER, C-ARM W/CLIPS, OEC GE

## (undated) DEVICE — BLADE, SAW PFC DUAL CUT 25 X 90

## (undated) DEVICE — STAPLER, SKIN PROXIMATE, 35 WIDE

## (undated) DEVICE — STAPLER, SKIN, PLUS, WIDE, 35

## (undated) DEVICE — GLOVE, SURGICAL, PROTEXIS NEOPRENE, 7.0, PF, LF

## (undated) DEVICE — SUTURE, VICRYL, 0, 18 IN, CT-1, UNDYED

## (undated) DEVICE — SUTURE, ETHIBOND XTRA, 5 V-37, GRN/BR, LF

## (undated) DEVICE — Device

## (undated) DEVICE — ELECTRODE, ELECTROSURGICAL, BLADE, NONSTICK, MODIFIED, 6.5 IN X 165 MM

## (undated) DEVICE — APPLICATOR, CHLORAPREP, W/ORANGE TINT, 26ML

## (undated) DEVICE — SYRINGE, 60 CC, LUER LOCK, MONOJECT

## (undated) DEVICE — SUTURE, VICRYL PLUS, 0, 8X18IN, CT-1, UND, BRAIDED

## (undated) DEVICE — DRAPE, TOWEL, SURGICAL, O.R, 17 X 24IN, STERILE, BLUE

## (undated) DEVICE — TOWEL, SURGICAL, NEURO, O/R, 16 X 26, BLUE, STERILE

## (undated) DEVICE — SYRINGE, 50 CC, LUER LOCK

## (undated) DEVICE — NEEDLE, REVERSE CUTTING, W/NITINOL LOOP, C-13, 1/2 CIRCLE, 36.6MML

## (undated) DEVICE — CLOSURE SYSTEM, DERMABOND, PRINEO, 22CM, STERILE

## (undated) DEVICE — ADVANCED MEDICAL DESIGNS C-ARM PACK, CLIP ON C-ARM DRAPE, 20IN X 20IN FOOT SWITCH DRAPE, 36IN X 36IN SNAP KOVER

## (undated) DEVICE — SUTURE, STRATAFIX PDS PLUS, 1, OS-8, SYMMETRIC 60CM DYED

## (undated) DEVICE — SUTURE, STRATAFIX, 3-0 MONOCRYL PLUS, PS-2 SPIRAL UNDYED

## (undated) DEVICE — SUTURE, STRATAFIX, SYMMETRIC PDS PLUS, SIZE 1, 45CM, CT 40MM VIOLET

## (undated) DEVICE — SUTURE, MONOCRYL, 3-0, 27 IN, PS-2, UNDYED